# Patient Record
Sex: FEMALE | Race: WHITE | NOT HISPANIC OR LATINO | Employment: FULL TIME | URBAN - METROPOLITAN AREA
[De-identification: names, ages, dates, MRNs, and addresses within clinical notes are randomized per-mention and may not be internally consistent; named-entity substitution may affect disease eponyms.]

---

## 2017-04-26 ENCOUNTER — TELEPHONE (OUTPATIENT)
Dept: OBGYN | Facility: MEDICAL CENTER | Age: 27
End: 2017-04-26

## 2017-04-26 ENCOUNTER — TELEPHONE (OUTPATIENT)
Dept: URGENT CARE | Facility: CLINIC | Age: 27
End: 2017-04-26

## 2017-04-26 ENCOUNTER — OFFICE VISIT (OUTPATIENT)
Dept: URGENT CARE | Facility: CLINIC | Age: 27
End: 2017-04-26
Payer: COMMERCIAL

## 2017-04-26 ENCOUNTER — HOSPITAL ENCOUNTER (OUTPATIENT)
Dept: LAB | Facility: MEDICAL CENTER | Age: 27
End: 2017-04-26
Attending: NURSE PRACTITIONER
Payer: COMMERCIAL

## 2017-04-26 VITALS
DIASTOLIC BLOOD PRESSURE: 82 MMHG | RESPIRATION RATE: 16 BRPM | SYSTOLIC BLOOD PRESSURE: 102 MMHG | WEIGHT: 160 LBS | HEART RATE: 69 BPM | HEIGHT: 69 IN | BODY MASS INDEX: 23.7 KG/M2 | OXYGEN SATURATION: 98 % | TEMPERATURE: 98.2 F

## 2017-04-26 DIAGNOSIS — R20.2 TINGLING: ICD-10-CM

## 2017-04-26 DIAGNOSIS — R42 LIGHT HEADEDNESS: ICD-10-CM

## 2017-04-26 DIAGNOSIS — Z30.432 ENCOUNTER FOR IUD REMOVAL: ICD-10-CM

## 2017-04-26 LAB
ALBUMIN SERPL BCP-MCNC: 4.6 G/DL (ref 3.2–4.9)
ALBUMIN/GLOB SERPL: 1.6 G/DL
ALP SERPL-CCNC: 63 U/L (ref 30–99)
ALT SERPL-CCNC: 14 U/L (ref 2–50)
ANION GAP SERPL CALC-SCNC: 10 MMOL/L (ref 0–11.9)
AST SERPL-CCNC: 20 U/L (ref 12–45)
BASOPHILS # BLD AUTO: 0.7 % (ref 0–1.8)
BASOPHILS # BLD: 0.05 K/UL (ref 0–0.12)
BILIRUB SERPL-MCNC: 1.2 MG/DL (ref 0.1–1.5)
BUN SERPL-MCNC: 7 MG/DL (ref 8–22)
CALCIUM SERPL-MCNC: 9 MG/DL (ref 8.4–10.2)
CHLORIDE SERPL-SCNC: 101 MMOL/L (ref 96–112)
CO2 SERPL-SCNC: 25 MMOL/L (ref 20–33)
CREAT SERPL-MCNC: 0.56 MG/DL (ref 0.5–1.4)
EKG 4674: NORMAL
EOSINOPHIL # BLD AUTO: 0.16 K/UL (ref 0–0.51)
EOSINOPHIL NFR BLD: 2.1 % (ref 0–6.9)
ERYTHROCYTE [DISTWIDTH] IN BLOOD BY AUTOMATED COUNT: 42 FL (ref 35.9–50)
GFR SERPL CREATININE-BSD FRML MDRD: >60 ML/MIN/1.73 M 2
GLOBULIN SER CALC-MCNC: 2.8 G/DL (ref 1.9–3.5)
GLUCOSE SERPL-MCNC: 98 MG/DL (ref 65–99)
HCT VFR BLD AUTO: 40.1 % (ref 37–47)
HGB BLD-MCNC: 13.9 G/DL (ref 12–16)
IMM GRANULOCYTES # BLD AUTO: 0.01 K/UL (ref 0–0.11)
IMM GRANULOCYTES NFR BLD AUTO: 0.1 % (ref 0–0.9)
LYMPHOCYTES # BLD AUTO: 2.8 K/UL (ref 1–4.8)
LYMPHOCYTES NFR BLD: 36.7 % (ref 22–41)
MCH RBC QN AUTO: 32.3 PG (ref 27–33)
MCHC RBC AUTO-ENTMCNC: 34.7 G/DL (ref 33.6–35)
MCV RBC AUTO: 93.3 FL (ref 81.4–97.8)
MONOCYTES # BLD AUTO: 0.48 K/UL (ref 0–0.85)
MONOCYTES NFR BLD AUTO: 6.3 % (ref 0–13.4)
NEUTROPHILS # BLD AUTO: 4.13 K/UL (ref 2–7.15)
NEUTROPHILS NFR BLD: 54.1 % (ref 44–72)
NRBC # BLD AUTO: 0 K/UL
NRBC BLD AUTO-RTO: 0 /100 WBC
PLATELET # BLD AUTO: 305 K/UL (ref 164–446)
PMV BLD AUTO: 8.6 FL (ref 9–12.9)
POTASSIUM SERPL-SCNC: 4 MMOL/L (ref 3.6–5.5)
PROT SERPL-MCNC: 7.4 G/DL (ref 6–8.2)
RBC # BLD AUTO: 4.3 M/UL (ref 4.2–5.4)
SODIUM SERPL-SCNC: 136 MMOL/L (ref 135–145)
WBC # BLD AUTO: 7.6 K/UL (ref 4.8–10.8)

## 2017-04-26 PROCEDURE — 36415 COLL VENOUS BLD VENIPUNCTURE: CPT

## 2017-04-26 PROCEDURE — 80053 COMPREHEN METABOLIC PANEL: CPT

## 2017-04-26 PROCEDURE — 85025 COMPLETE CBC W/AUTO DIFF WBC: CPT

## 2017-04-26 PROCEDURE — 93000 ELECTROCARDIOGRAM COMPLETE: CPT | Performed by: NURSE PRACTITIONER

## 2017-04-26 PROCEDURE — 99214 OFFICE O/P EST MOD 30 MIN: CPT | Performed by: NURSE PRACTITIONER

## 2017-04-26 RX ORDER — ALPRAZOLAM 0.25 MG/1
0.25 TABLET ORAL 3 TIMES DAILY PRN
Qty: 20 TAB | Refills: 0 | Status: SHIPPED | OUTPATIENT
Start: 2017-04-26 | End: 2017-05-06

## 2017-04-26 ASSESSMENT — ENCOUNTER SYMPTOMS
DIZZINESS: 1
TREMORS: 0
SENSORY CHANGE: 0
RESPIRATORY NEGATIVE: 1
SEIZURES: 0
SHORTNESS OF BREATH: 0
FEVER: 0
PSYCHIATRIC NEGATIVE: 1
LOSS OF CONSCIOUSNESS: 0
TINGLING: 1
FOCAL WEAKNESS: 0
SPEECH CHANGE: 0
MUSCULOSKELETAL NEGATIVE: 1
CHILLS: 0

## 2017-04-26 ASSESSMENT — PAIN SCALES - GENERAL: PAINLEVEL: NO PAIN

## 2017-04-26 NOTE — TELEPHONE ENCOUNTER
Dominick Cramer pt has new pt apt on 6/28/17 and would like to have IUD removed only. Referral placed today.

## 2017-04-26 NOTE — MR AVS SNAPSHOT
"        Karyn Dalton   2017 2:00 PM   Office Visit   MRN: [id number]    Department:  [facility]   Dept Phone:  [phone]    Description:  Female : 1990   Provider:  Cathey J Hamman, A.P.N.           Reason for Visit     Dizziness 6pm yesterday, tingling in arms and legs, dizzy      Allergies as of 2017     No Known Allergies      You were diagnosed with     Light headedness   [564250]       Tingling   [382983]         Vital Signs     Blood Pressure Pulse Temperature Respirations Height Weight    102/82 mmHg 69 36.8 °C (98.2 °F) 16 1.753 m (5' 9.02\") 72.576 kg (160 lb)    Body Mass Index Oxygen Saturation Smoking Status             23.62 kg/m2 98% Never Smoker          Basic Information     Date Of Birth Sex Race Ethnicity Preferred Language    1990 Female White Non- English      Health Maintenance        Date Due Completion Dates    IMM DTaP/Tdap/Td Vaccine (1 - Tdap) 9/10/2007 ---    PAP SMEAR 9/10/2009 ---            Results     POCT EKG      Component    EKG                        Current Immunizations     No immunizations on file.      Below and/or attached are the medications your provider expects you to take. Review all of your home medications and newly ordered medications with your provider and/or pharmacist. Follow medication instructions as directed by your provider and/or pharmacist. Please keep your medication list with you and share with your provider. Update the information when medications are discontinued, doses are changed, or new medications (including over-the-counter products) are added; and carry medication information at all times in the event of emergency situations     Allergies:  No Known Allergies          Medications  Valid as of: 2017 -  3:04 PM    Generic Name Brand Name Tablet Size Instructions for use    ALPRAZolam (Tab) XANAX 0.25 MG Take 1 Tab by mouth 3 times a day as needed for Anxiety.        Levonorgestrel (IUD) MIRENA 20 " MCG/24HR 1 Each by Intrauterine route Once.        Ondansetron (TABLET DISPERSIBLE) ZOFRAN ODT 4 MG Take 1 Tab by mouth every 8 hours as needed for Nausea/Vomiting.        .                 Medicines prescribed today were sent to:     [facility] #[id number] - [place], [place] - [address]    [address] [place] [address]    Phone: [phone] Fax: [phone]    Open 24 Hours?: No      Medication refill instructions:       If your prescription bottle indicates you have medication refills left, it is not necessary to call your provider’s office. Please contact your pharmacy and they will refill your medication.    If your prescription bottle indicates you do not have any refills left, you may request refills at any time through one of the following ways: The online [employer] system (except Urgent Care), by calling your provider’s office, or by asking your pharmacy to contact your provider’s office with a refill request. Medication refills are processed only during regular business hours and may not be available until the next business day. Your provider may request additional information or to have a follow-up visit with you prior to refilling your medication.   *Please Note: Medication refills are assigned a new Rx number when refilled electronically. Your pharmacy may indicate that no refills were authorized even though a new prescription for the same medication is available at the pharmacy. Please request the medicine by name with the pharmacy before contacting your provider for a refill.        Your To Do List     Future Labs/Procedures Complete By Expires    CBC WITH DIFFERENTIAL  As directed 4/26/2018    COMP METABOLIC PANEL  As directed 4/26/2018      Referral     A referral request has been sent to our patient care coordination department. Please allow 3-5 business days for us to process this request and contact you either by phone or mail. If you do not hear from us by the 5th business day,  please call us at [phone].           MuckRock Access Code: [email or web address]  Expires: 5/26/2017  3:04 PM    MuckRock  A secure, online tool to manage your health information     [employer]’s MuckRock® is a secure, online tool that connects you to your personalized health information from the privacy of your home -- day or night - making it very easy for you to manage your healthcare. Once the activation process is completed, you can even access your medical information using the MuckRock giselle, which is available for free in the Apple Giselle store or Google Play store.     MuckRock provides the following levels of access (as shown below):   My Chart Features   [facility] Doctor [facility]  Specialists [facility]  Urgent  [facility]  Primary Care  Doctor   Email your healthcare team securely and privately 24/7 X X X    Manage appointments: schedule your next appointment; view details of past/upcoming appointments X      Request prescription refills. X      View recent personal medical records, including lab and immunizations X X X X   View health record, including health history, allergies, medications X X X X   Read reports about your outpatient visits, procedures, consult and ER notes X X X X   See your discharge summary, which is a recap of your hospital and/or ER visit that includes your diagnosis, lab results, and care plan. X X       How to register for MuckRock:  1. Go to  [email or web address].  2. Click on the Sign Up Now box, which takes you to the New Member Sign Up page. You will need to provide the following information:  a. Enter your MuckRock Access Code exactly as it appears at the top of this page. (You will not need to use this code after you’ve completed the sign-up process. If you do not sign up before the expiration date, you must request a new code.)   b. Enter your date of birth.   c. Enter your home email address.   d. Click Submit, and follow the next screen’s  instructions.  3. Create a HipLinkt ID. This will be your HipLinkt login ID and cannot be changed, so think of one that is secure and easy to remember.  4. Create a HipLinkt password. You can change your password at any time.  5. Enter your Password Reset Question and Answer. This can be used at a later time if you forget your password.   6. Enter your e-mail address. This allows you to receive e-mail notifications when new information is available in [employer].  7. Click Sign Up. You can now view your health information.    For assistance activating your [employer] account, call [phone]

## 2017-04-26 NOTE — PROGRESS NOTES
Subjective:      Karyn Dalton is a 26 y.o. female who presents with Dizziness    PMH: no history of chronic illness    Social hx:  Non-smoker    Family hx: not pertinent t to today's visit    Allergies: Review of patient's allergies indicates no known allergies.    This patient is a 18-year-old female who presents today with complaint of episode of lightheadedness, and subsequent tingling in her hands and legs. She states that she was doing yard work and was not exerting herself, but she was in a hunched over position. She stood up and started she became very lightheaded. She states that she believes this lightheadedness lasted for about 2 hours and she also had tingling in her hands and feet. States that that feeling has continued today though not to the same extent. She does admit to being anxious about many things, and states that she did feel anxious after she became lightheaded last night. She has not had any chest pain or shortness of breath. She does not have any history of congenital heart disease. She has been otherwise healthy.          Dizziness  This is a new problem. The current episode started yesterday. The problem occurs intermittently. The problem has been waxing and waning. Pertinent negatives include no chest pain, chills or fever. Nothing aggravates the symptoms. She has tried nothing for the symptoms. The treatment provided no relief.       Review of Systems   Constitutional: Negative for fever, chills and malaise/fatigue.   Respiratory: Negative.  Negative for shortness of breath.    Cardiovascular: Negative for chest pain.   Genitourinary: Negative.    Musculoskeletal: Negative.    Skin: Negative.    Neurological: Positive for dizziness and tingling. Negative for tremors, sensory change, speech change, focal weakness, seizures and loss of consciousness.   Psychiatric/Behavioral: Negative.      All other systems reviewed and are negative        Objective:     Temp(Src) 36.8 °C (98.3 °F)   "Resp 16  Ht 1.753 m (5' 9.02\")  Wt 72.576 kg (160 lb)  BMI 23.62 kg/m2     Physical Exam   Constitutional: She is oriented to person, place, and time. She appears well-developed and well-nourished. No distress.   Cardiovascular: Normal rate and regular rhythm.    Musculoskeletal: Normal range of motion.   Neurological: She is alert and oriented to person, place, and time. She has normal strength. She displays no atrophy and no tremor. No cranial nerve deficit or sensory deficit. She exhibits normal muscle tone. She displays a negative Romberg sign. She displays no seizure activity. Coordination and gait normal. GCS eye subscore is 4. GCS verbal subscore is 5. GCS motor subscore is 6.   Cranial nerves II through XII intact. Cerebellar function intact.  5/5 and equal in the upper extremities. Push/pull 5/5 and equal in the upper extremities. Strength is 5/5 and equal in the lower extremities. Facial features symmetric with equal movement, speech is clear and logical. Proprioception is intact. Romberg is negative. Gait is even and steady.   Skin: She is not diaphoretic.   Vitals reviewed.    EKG: NSR, no ST elevation or depression, no ectopy, no ischemic changes          Assessment/Plan:   Light headedness; possibly postural  Numbness/tingling; possibly secondary to anxiety    -Referral given to cardiology at patient's request   -Patient intends to follow up also with a counselor or mental health    -Follow up also with PCP   -ER precautions given for recurrent symptoms   -CBC, CMP   -xanax PRN for acute anxiety  There are no diagnoses linked to this encounter.      "

## 2017-04-27 NOTE — TELEPHONE ENCOUNTER
Attempted to notify patient by phone of results. No answer. Left detailed VM with results and requested call back for any questions or concerns. All labs are within normal limits. Patient is not currently registered for [employer].

## 2017-05-06 ENCOUNTER — APPOINTMENT (OUTPATIENT)
Dept: RADIOLOGY | Facility: MEDICAL CENTER | Age: 27
End: 2017-05-06
Attending: EMERGENCY MEDICINE
Payer: COMMERCIAL

## 2017-05-06 ENCOUNTER — HOSPITAL ENCOUNTER (EMERGENCY)
Facility: MEDICAL CENTER | Age: 27
End: 2017-05-07
Attending: EMERGENCY MEDICINE
Payer: COMMERCIAL

## 2017-05-06 DIAGNOSIS — R10.2 ACUTE PELVIC PAIN, FEMALE: ICD-10-CM

## 2017-05-06 DIAGNOSIS — E86.0 DEHYDRATION: ICD-10-CM

## 2017-05-06 LAB
ALBUMIN SERPL BCP-MCNC: 4.9 G/DL (ref 3.2–4.9)
ALBUMIN/GLOB SERPL: 1.8 G/DL
ALP SERPL-CCNC: 77 U/L (ref 30–99)
ALT SERPL-CCNC: 17 U/L (ref 2–50)
ANION GAP SERPL CALC-SCNC: 13 MMOL/L (ref 0–11.9)
APPEARANCE UR: CLEAR
AST SERPL-CCNC: 26 U/L (ref 12–45)
BASOPHILS # BLD AUTO: 0.6 % (ref 0–1.8)
BASOPHILS # BLD: 0.07 K/UL (ref 0–0.12)
BILIRUB SERPL-MCNC: 0.7 MG/DL (ref 0.1–1.5)
BILIRUB UR QL STRIP.AUTO: NEGATIVE
BUN SERPL-MCNC: 13 MG/DL (ref 8–22)
CALCIUM SERPL-MCNC: 9.4 MG/DL (ref 8.4–10.2)
CHLORIDE SERPL-SCNC: 101 MMOL/L (ref 96–112)
CO2 SERPL-SCNC: 23 MMOL/L (ref 20–33)
COLOR UR: YELLOW
CREAT SERPL-MCNC: 0.84 MG/DL (ref 0.5–1.4)
CULTURE IF INDICATED INDCX: NO UA CULTURE
EOSINOPHIL # BLD AUTO: 0.13 K/UL (ref 0–0.51)
EOSINOPHIL NFR BLD: 1.1 % (ref 0–6.9)
ERYTHROCYTE [DISTWIDTH] IN BLOOD BY AUTOMATED COUNT: 40.5 FL (ref 35.9–50)
GFR SERPL CREATININE-BSD FRML MDRD: >60 ML/MIN/1.73 M 2
GLOBULIN SER CALC-MCNC: 2.8 G/DL (ref 1.9–3.5)
GLUCOSE SERPL-MCNC: 90 MG/DL (ref 65–99)
GLUCOSE UR STRIP.AUTO-MCNC: NEGATIVE MG/DL
HCG UR QL: NEGATIVE
HCT VFR BLD AUTO: 41.6 % (ref 37–47)
HGB BLD-MCNC: 14.7 G/DL (ref 12–16)
IMM GRANULOCYTES # BLD AUTO: 0.03 K/UL (ref 0–0.11)
IMM GRANULOCYTES NFR BLD AUTO: 0.3 % (ref 0–0.9)
KETONES UR STRIP.AUTO-MCNC: ABNORMAL MG/DL
LEUKOCYTE ESTERASE UR QL STRIP.AUTO: NEGATIVE
LIPASE SERPL-CCNC: 30 U/L (ref 7–58)
LYMPHOCYTES # BLD AUTO: 3.12 K/UL (ref 1–4.8)
LYMPHOCYTES NFR BLD: 27.5 % (ref 22–41)
MCH RBC QN AUTO: 32.2 PG (ref 27–33)
MCHC RBC AUTO-ENTMCNC: 35.3 G/DL (ref 33.6–35)
MCV RBC AUTO: 91 FL (ref 81.4–97.8)
MICRO URNS: ABNORMAL
MONOCYTES # BLD AUTO: 0.75 K/UL (ref 0–0.85)
MONOCYTES NFR BLD AUTO: 6.6 % (ref 0–13.4)
NEUTROPHILS # BLD AUTO: 7.23 K/UL (ref 2–7.15)
NEUTROPHILS NFR BLD: 63.9 % (ref 44–72)
NITRITE UR QL STRIP.AUTO: NEGATIVE
NRBC # BLD AUTO: 0 K/UL
NRBC BLD AUTO-RTO: 0 /100 WBC
PH UR STRIP.AUTO: 5 [PH]
PLATELET # BLD AUTO: 344 K/UL (ref 164–446)
PMV BLD AUTO: 9.5 FL (ref 9–12.9)
POTASSIUM SERPL-SCNC: 3.3 MMOL/L (ref 3.6–5.5)
PROT SERPL-MCNC: 7.7 G/DL (ref 6–8.2)
PROT UR QL STRIP: NEGATIVE MG/DL
RBC # BLD AUTO: 4.57 M/UL (ref 4.2–5.4)
RBC UR QL AUTO: NEGATIVE
SODIUM SERPL-SCNC: 137 MMOL/L (ref 135–145)
SP GR UR REFRACTOMETRY: 1.02
SP GR UR STRIP.AUTO: 1.02
WBC # BLD AUTO: 11.3 K/UL (ref 4.8–10.8)

## 2017-05-06 PROCEDURE — 85025 COMPLETE CBC W/AUTO DIFF WBC: CPT

## 2017-05-06 PROCEDURE — 96374 THER/PROPH/DIAG INJ IV PUSH: CPT

## 2017-05-06 PROCEDURE — 80053 COMPREHEN METABOLIC PANEL: CPT

## 2017-05-06 PROCEDURE — 96375 TX/PRO/DX INJ NEW DRUG ADDON: CPT

## 2017-05-06 PROCEDURE — 83690 ASSAY OF LIPASE: CPT

## 2017-05-06 PROCEDURE — 81003 URINALYSIS AUTO W/O SCOPE: CPT

## 2017-05-06 PROCEDURE — 81025 URINE PREGNANCY TEST: CPT

## 2017-05-06 PROCEDURE — 700111 HCHG RX REV CODE 636 W/ 250 OVERRIDE (IP): Performed by: EMERGENCY MEDICINE

## 2017-05-06 PROCEDURE — 96361 HYDRATE IV INFUSION ADD-ON: CPT

## 2017-05-06 PROCEDURE — 76830 TRANSVAGINAL US NON-OB: CPT

## 2017-05-06 PROCEDURE — 700105 HCHG RX REV CODE 258: Performed by: EMERGENCY MEDICINE

## 2017-05-06 PROCEDURE — 99285 EMERGENCY DEPT VISIT HI MDM: CPT

## 2017-05-06 RX ORDER — SODIUM CHLORIDE 9 MG/ML
1000 INJECTION, SOLUTION INTRAVENOUS ONCE
Status: COMPLETED | OUTPATIENT
Start: 2017-05-06 | End: 2017-05-07

## 2017-05-06 RX ORDER — ONDANSETRON 2 MG/ML
4 INJECTION INTRAMUSCULAR; INTRAVENOUS ONCE
Status: COMPLETED | OUTPATIENT
Start: 2017-05-06 | End: 2017-05-06

## 2017-05-06 RX ORDER — MORPHINE SULFATE 10 MG/ML
6 INJECTION, SOLUTION INTRAMUSCULAR; INTRAVENOUS ONCE
Status: COMPLETED | OUTPATIENT
Start: 2017-05-06 | End: 2017-05-06

## 2017-05-06 RX ADMIN — MORPHINE SULFATE 6 MG: 10 INJECTION INTRAVENOUS at 23:11

## 2017-05-06 RX ADMIN — SODIUM CHLORIDE 1000 ML: 9 INJECTION, SOLUTION INTRAVENOUS at 23:11

## 2017-05-06 RX ADMIN — ONDANSETRON 4 MG: 2 INJECTION INTRAMUSCULAR; INTRAVENOUS at 23:12

## 2017-05-06 ASSESSMENT — PAIN SCALES - GENERAL: PAINLEVEL_OUTOF10: 6

## 2017-05-06 NOTE — ED AVS SNAPSHOT
5/7/2017    Karyn Dalton  [address] [place] [address]    Dear Karyn:    [facility] wants to ensure your discharge home is safe and you or your loved ones have had all of your questions answered regarding your care after you leave the hospital.    Below is a list of resources and contact information should you have any questions regarding your hospital stay, follow-up instructions, or active medical symptoms.    Questions or Concerns Regarding… Contact   Medical Questions Related to Your Discharge  (7 days a week, 8am-5pm) Contact a Nurse Care Coordinator   [phone]   Medical Questions Not Related to Your Discharge  (24 hours a day / 7 days a week)  Contact the Nurse Health Line   [phone]    Medications or Discharge Instructions Refer to your discharge packet   or contact your [facility] Primary Care Provider   [phone]   Follow-up Appointment(s) Schedule your appointment via [employer]   or contact Scheduling [phone]   Billing Review your statement via [employer]  or contact Billing [phone]   Medical Records Review your records via [employer]   or contact Medical Records [phone]     You may receive a telephone call within two days of discharge. This call is to make certain you understand your discharge instructions and have the opportunity to have any questions answered. You can also easily access your medical information, test results and upcoming appointments via the [employer] free online health management tool. You can learn more and sign up at [employer]. For assistance setting up your [employer] account, please call [phone].    Once again, we want to ensure your discharge home is safe and that you have a clear understanding of any next steps in your care. If you have any questions or concerns, please do not hesitate to contact us, we are here for you. Thank you for choosing [facility] for your healthcare needs.    Sincerely,    Your [facility] Healthcare Team

## 2017-05-06 NOTE — ED AVS SNAPSHOT
Home Care Instructions                                                                                                                Karyn Dalton   MRN: [id number]    Department:  [facility], Emergency Dept   Date of Visit:  5/6/2017            [facility], Emergency Dept    [address]    [place] [address]    Phone:  [phone]      You were seen by     Sharmin Boykin M.D.      Your Diagnosis Was     Acute pelvic pain, female     R10.2       These are the medications you received during your hospitalization from 05/06/2017 2209 to 05/07/2017 0107     Date/Time Order Dose Route Action    05/06/2017 2311 NS infusion 1,000 mL 1,000 mL Intravenous New Bag    05/06/2017 2311 morphine (pf) 10 mg/ml injection 6 mg 6 mg Intravenous Given    05/06/2017 2312 ondansetron (ZOFRAN) syringe/vial injection 4 mg 4 mg Intravenous Given      Follow-up Information     1. Follow up with Mary Trevino M.D.. Schedule an appointment as soon as possible for a visit in 3 days.    Specialty:  Family Medicine    Contact information    [address] [place] [address] [address]  [address]  [place] [address] [phone]        Medication Information     Review all of your home medications and newly ordered medications with your primary doctor and/or pharmacist as soon as possible. Follow medication instructions as directed by your doctor and/or pharmacist.     Please keep your complete medication list with you and share with your physician. Update the information when medications are discontinued, doses are changed, or new medications (including over-the-counter products) are added; and carry medication information at all times in the event of emergency situations.               Medication List      START taking these medications        Instructions    Morning Afternoon Evening Bedtime    hydrocodone-acetaminophen 5-325 MG Tabs per tablet   Commonly known as:  NORCO        Take 1-2 Tabs by  "mouth every four hours as needed (severe pain).   Dose:  1-2 Tab                        naproxen 500 MG Tabs   Commonly known as:  NAPROSYN        Take 1 Tab by mouth 2 times daily with meals as needed (moderate pain).   Dose:  500 mg                          ASK your doctor about these medications        Instructions    Morning Afternoon Evening Bedtime    MIRENA (52 MG) 20 MCG/24HR IUD   Generic drug:  levonorgestrel        1 Each by Intrauterine route Once.   Dose:  1 Each                             Where to Get Your Medications      You can get these medications from any pharmacy     Bring a paper prescription for each of these medications    - hydrocodone-acetaminophen 5-325 MG Tabs per tablet  - naproxen 500 MG Tabs            Procedures and tests performed during your visit     CBC WITH DIFFERENTIAL    COMP METABOLIC PANEL    ESTIMATED GFR    HCG QUALITATIVE UR (Lab)    IV Saline Lock    LIPASE    Oxygen    Pulse Ox    REFRACTOMETER SG    URINALYSIS CULTURE, IF INDICATED    US-GYN-PELVIS TRANSVAGINAL        Discharge Instructions       Ovarian Cyst  An ovarian cyst is a fluid-filled sac that forms on an ovary. The ovaries are small organs that produce eggs in women. Various types of cysts can form on the ovaries. Most are not cancerous. Many do not cause problems, and they often go away on their own. Some may cause symptoms and require treatment. Common types of ovarian cysts include:  · Functional cysts--These cysts may occur every month during the menstrual cycle. This is normal. The cysts usually go away with the next menstrual cycle if the woman does not get pregnant. Usually, there are no symptoms with a functional cyst.  · Endometrioma cysts--These cysts form from the tissue that lines the uterus. They are also called \"chocolate cysts\" because they become filled with blood that turns brown. This type of cyst can cause pain in the lower abdomen during intercourse and with your menstrual " period.  · Cystadenoma cysts--This type develops from the cells on the outside of the ovary. These cysts can get very big and cause lower abdomen pain and pain with intercourse. This type of cyst can twist on itself, cut off its blood supply, and cause severe pain. It can also easily rupture and cause a lot of pain.  · Dermoid cysts--This type of cyst is sometimes found in both ovaries. These cysts may contain different kinds of body tissue, such as skin, teeth, hair, or cartilage. They usually do not cause symptoms unless they get very big.  · Theca lutein cysts--These cysts occur when too much of a certain hormone (human chorionic gonadotropin) is produced and overstimulates the ovaries to produce an egg. This is most common after procedures used to assist with the conception of a baby (in vitro fertilization).  CAUSES   · Fertility drugs can cause a condition in which multiple large cysts are formed on the ovaries. This is called ovarian hyperstimulation syndrome.  · A condition called polycystic ovary syndrome can cause hormonal imbalances that can lead to nonfunctional ovarian cysts.  SIGNS AND SYMPTOMS   Many ovarian cysts do not cause symptoms. If symptoms are present, they may include:  · Pelvic pain or pressure.  · Pain in the lower abdomen.  · Pain during sexual intercourse.  · Increasing girth (swelling) of the abdomen.  · Abnormal menstrual periods.  · Increasing pain with menstrual periods.  · Stopping having menstrual periods without being pregnant.  DIAGNOSIS   These cysts are commonly found during a routine or annual pelvic exam. Tests may be ordered to find out more about the cyst. These tests may include:  · Ultrasound.  · X-ray of the pelvis.  · CT scan.  · MRI.  · Blood tests.  TREATMENT   Many ovarian cysts go away on their own without treatment. Your health care provider may want to check your cyst regularly for 2-3 months to see if it changes. For women in menopause, it is particularly  important to monitor a cyst closely because of the higher rate of ovarian cancer in menopausal women. When treatment is needed, it may include any of the following:  · A procedure to drain the cyst (aspiration). This may be done using a long needle and ultrasound. It can also be done through a laparoscopic procedure. This involves using a thin, lighted tube with a tiny camera on the end (laparoscope) inserted through a small incision.  · Surgery to remove the whole cyst. This may be done using laparoscopic surgery or an open surgery involving a larger incision in the lower abdomen.  · Hormone treatment or birth control pills. These methods are sometimes used to help dissolve a cyst.  HOME CARE INSTRUCTIONS   · Only take over-the-counter or prescription medicines as directed by your health care provider.  · Follow up with your health care provider as directed.  · Get regular pelvic exams and Pap tests.  SEEK MEDICAL CARE IF:   · Your periods are late, irregular, or painful, or they stop.  · Your pelvic pain or abdominal pain does not go away.  · Your abdomen becomes larger or swollen.  · You have pressure on your bladder or trouble emptying your bladder completely.  · You have pain during sexual intercourse.  · You have feelings of fullness, pressure, or discomfort in your stomach.  · You lose weight for no apparent reason.  · You feel generally ill.  · You become constipated.  · You lose your appetite.  · You develop acne.  · You have an increase in body and facial hair.  · You are gaining weight, without changing your exercise and eating habits.  · You think you are pregnant.  SEEK IMMEDIATE MEDICAL CARE IF:   · You have increasing abdominal pain.  · You feel sick to your stomach (nauseous), and you throw up (vomit).  · You develop a fever that comes on suddenly.  · You have abdominal pain during a bowel movement.  · Your menstrual periods become heavier than usual.  MAKE SURE YOU:  · Understand these  instructions.  · Will watch your condition.  · Will get help right away if you are not doing well or get worse.     This information is not intended to replace advice given to you by your health care provider. Make sure you discuss any questions you have with your health care provider.     Document Released: 12/18/2006 Document Revised: 12/23/2014 Document Reviewed: 08/25/2014  [employer] Patient Education ©2016 [employer]            Patient Information     Patient Information    Following emergency treatment: all patient requiring follow-up care must return either to a private physician or a clinic if your condition worsens before you are able to obtain further medical attention, please return to the emergency room.     Billing Information    At [facility], we work to make the billing process streamlined for our patients.  Our Representatives are here to answer any questions you may have regarding your hospital bill.  If you have insurance coverage and have supplied your insurance information to us, we will submit a claim to your insurer on your behalf.  Should you have any questions regarding your bill, we can be reached online or by phone as follows:  Online: You are able pay your bills online or live chat with our representatives about any billing questions you may have. We are here to help Monday - Friday from 8:00am to 7:30pm and 9:00am - 12:00pm on Saturdays.  Please visit [email or web address]  for more information.   Phone:  [phone] or [phone]    Please note that your emergency physician, surgeon, pathologist, radiologist, anesthesiologist, and other specialists are not employed by [facility] and will therefore bill separately for their services.  Please contact them directly for any questions concerning their bills at the numbers below:     Emergency Physician Services:  [phone]  [facility]:  [phone]  [facility]:  [phone]  [facility]:  [phone]    1. Your final bill may vary from the amount quoted upon discharge if all procedures are not complete at that time, or if your doctor has additional procedures of which we are not aware. You will receive an additional bill if you return to the Emergency Department at [facility] hospital for suture removal regardless of the facility of which the sutures were placed.     2. Please arrange for settlement of this account at the emergency registration.    3. All self-pay accounts are due in full at the time of treatment.  If you are unable to meet this obligation then payment is expected within 4-5 days.     4. If you have had radiology studies (CT, X-ray, Ultrasound, MRI), you have received a preliminary result during your emergency department visit. Please contact the radiology department [phone] to receive a copy of your final result. Please discuss the Final result with your primary physician or with the follow up physician provided.     Crisis Hotline:  [place] Crisis Hotline:  4-165-EUDEPOT or 1-812.255.9161  [place] Crisis Hotline:    1-939.786.4305 or 954-386-0364         ED Discharge Follow Up Questions    1. In order to provide you with very good care, we would like to follow up with a phone call in the next few days.  May we have your permission to contact you?     YES /  NO    2. What is the best phone number to call you? (       )_____-__________    3. What is the best time to call you?      Morning  /  Afternoon  /  Evening                   Patient Signature:  ____________________________________________________________    Date:  ____________________________________________________________      Your appointments     Jun 28, 2017  3:30 PM   Annual Exam with Marybeth Chisholm M.D.   [facility])    [address] [facility] [address]  [facility] [address]   [phone]

## 2017-05-06 NOTE — ED AVS SNAPSHOT
[employer] Access Code: Activation code not generated  Current [employer] Status: Active    Inaikahart  A secure, online tool to manage your health information     [employer]’s [employer]® is a secure, online tool that connects you to your personalized health information from the privacy of your home -- day or night - making it very easy for you to manage your healthcare. Once the activation process is completed, you can even access your medical information using the [employer] giselle, which is available for free in the Apple Giselle store or Google Play store.     [employer] provides the following levels of access (as shown below):   My Chart Features   [facility] Primary Care Doctor [facility]  Specialists [facility]  Urgent  Care Non-[facility]  Primary Care  Doctor   Email your healthcare team securely and privately 24/7 X X X X   Manage appointments: schedule your next appointment; view details of past/upcoming appointments X      Request prescription refills. X      View recent personal medical records, including lab and immunizations X X X X   View health record, including health history, allergies, medications X X X X   Read reports about your outpatient visits, procedures, consult and ER notes X X X X   See your discharge summary, which is a recap of your hospital and/or ER visit that includes your diagnosis, lab results, and care plan. X X       How to register for [employer]:  1. Go to  [email or web address].  2. Click on the Sign Up Now box, which takes you to the New Member Sign Up page. You will need to provide the following information:  a. Enter your [employer] Access Code exactly as it appears at the top of this page. (You will not need to use this code after you’ve completed the sign-up process. If you do not sign up before the expiration date, you must request a new code.)   b. Enter your date of birth.   c. Enter your home email address.   d. Click Submit, and follow the next screen’s instructions.  3. Create a [employer] ID. This will  be your [employer] login ID and cannot be changed, so think of one that is secure and easy to remember.  4. Create a [employer] password. You can change your password at any time.  5. Enter your Password Reset Question and Answer. This can be used at a later time if you forget your password.   6. Enter your e-mail address. This allows you to receive e-mail notifications when new information is available in Billogram.  7. Click Sign Up. You can now view your health information.    For assistance activating your [employer] account, call [phone]

## 2017-05-07 VITALS
BODY MASS INDEX: 23.18 KG/M2 | DIASTOLIC BLOOD PRESSURE: 81 MMHG | OXYGEN SATURATION: 98 % | HEART RATE: 65 BPM | HEIGHT: 69 IN | WEIGHT: 156.53 LBS | RESPIRATION RATE: 18 BRPM | TEMPERATURE: 98.4 F | SYSTOLIC BLOOD PRESSURE: 131 MMHG

## 2017-05-07 PROCEDURE — 96361 HYDRATE IV INFUSION ADD-ON: CPT

## 2017-05-07 RX ORDER — HYDROCODONE BITARTRATE AND ACETAMINOPHEN 5; 325 MG/1; MG/1
1-2 TABLET ORAL EVERY 4 HOURS PRN
Qty: 14 TAB | Refills: 0 | Status: SHIPPED | OUTPATIENT
Start: 2017-05-07 | End: 2017-09-20

## 2017-05-07 RX ORDER — NAPROXEN 500 MG/1
500 TABLET ORAL
Qty: 20 TAB | Refills: 0 | Status: SHIPPED | OUTPATIENT
Start: 2017-05-07 | End: 2017-09-20

## 2017-05-07 ASSESSMENT — PAIN SCALES - GENERAL: PAINLEVEL_OUTOF10: 0

## 2017-05-07 NOTE — ED PROVIDER NOTES
ED Provider Note    ED Provider Note    Scribed for Sharmin Boykin MD by Sharmin Boykin. 5/6/2017, 10:59 PM.    Primary care provider: Pcp Pt States None  Means of arrival: Private  History obtained from: Patient  History limited by: None    CHIEF COMPLAINT  Chief Complaint   Patient presents with   • Abdominal Pain       HPI  Karyn Dalton is a 26 y.o. female who presents to the Emergency Department for acute onset of pelvic pain. Patient relates while she was walking at work sudden onset of severe left lower abdomen/pelvic pain with radiation to the sacrococcygeal region of her back. No right-sided symptoms. Pain is constant but waxing and waning in severity, sharp in character, currently severe localized as noted above. No dysuria, no hematuria, patient relates general fatigue but no nausea, no vomiting, no fever. She has had no vaginal bleeding, last menstrual period about 2 weeks ago. Patient notes somewhat similar symptoms when she was a teenager and again she was in her early 20s and have been diagnosed with ovarian cyst. She is required no abdominal or pelvic surgeries, she has not IUD for birth control. No clear alleviating or exacerbating factors.    REVIEW OF SYSTEMS  Pertinent positives include acute left-sided pelvic pain. Pertinent negatives include no fever, no vaginal bleeding, no dysuria, no vaginal discharge, no hematuria, no right-sided abdominal/pelvic pain.  All other systems reviewed and negative.    PAST MEDICAL HISTORY    previous ovarian cyst, otherwise healthy    SURGICAL HISTORY  patient denies any surgical history    SOCIAL HISTORY  Social History   Substance Use Topics   • Smoking status: Never Smoker    • Smokeless tobacco: Never Used   • Alcohol Use: Yes      Comment: weekly      History   Drug Use No       FAMILY HISTORY  No history in immediate family of ovarian cyst    CURRENT MEDICATIONS  Home Medications     Reviewed by Pio Emanuel R.N. (Registered Nurse)  "on 05/06/17 at 2218  Med List Status: Complete    Medication Last Dose Status    levonorgestrel (MIRENA) 20 MCG/24HR IUD 4/26/2017 Active                ALLERGIES  No Known Allergies    PHYSICAL EXAM  VITAL SIGNS: /81 mmHg  Pulse 65  Temp(Src) 36.9 °C (98.4 °F)  Resp 18  Ht 1.753 m (5' 9\")  Wt 71 kg (156 lb 8.4 oz)  BMI 23.10 kg/m2  SpO2 98%    General: Alert, moderate acute distress  Skin: Warm, dry, normal for ethnicity  Head: Normocephalic, atraumatic  Neck: Trachea midline, no tenderness  Eye: PERRL, normal conjunctiva  ENMT: Oral mucosa moist, no pharyngeal erythema or exudate  Cardiovascular: Regular rate and rhythm, No murmur, Normal peripheral perfusion  Respiratory: Lungs CTA, respirations are non-labored, breath sounds are equal  Gastrointestinal: Soft, left lower quadrant/adnexal tenderness, no palpable masses, no guarding, rebound, no rigidity.  Musculoskeletal: No swelling, no deformity. Left CVA tenderness, mild left sacroiliac and lumbosacral paraspinous tenderness with no midline tenderness or step-off  Neurological: Alert and oriented to person, place, time, and situation  Lymphatics: No lymphadenopathy  Psychiatric: Cooperative, anxious but otherwise appropriate mood & affect      DIAGNOSTIC STUDIES/PROCEDURES    LABS  Results for orders placed or performed during the hospital encounter of 05/06/17   CBC WITH DIFFERENTIAL   Result Value Ref Range    WBC 11.3 (H) 4.8 - 10.8 K/uL    RBC 4.57 4.20 - 5.40 M/uL    Hemoglobin 14.7 12.0 - 16.0 g/dL    Hematocrit 41.6 37.0 - 47.0 %    MCV 91.0 81.4 - 97.8 fL    MCH 32.2 27.0 - 33.0 pg    MCHC 35.3 (H) 33.6 - 35.0 g/dL    RDW 40.5 35.9 - 50.0 fL    Platelet Count 344 164 - 446 K/uL    MPV 9.5 9.0 - 12.9 fL    Neutrophils-Polys 63.90 44.00 - 72.00 %    Lymphocytes 27.50 22.00 - 41.00 %    Monocytes 6.60 0.00 - 13.40 %    Eosinophils 1.10 0.00 - 6.90 %    Basophils 0.60 0.00 - 1.80 %    Immature Granulocytes 0.30 0.00 - 0.90 %    Nucleated RBC " 0.00 /100 WBC    Neutrophils (Absolute) 7.23 (H) 2.00 - 7.15 K/uL    Lymphs (Absolute) 3.12 1.00 - 4.80 K/uL    Monos (Absolute) 0.75 0.00 - 0.85 K/uL    Eos (Absolute) 0.13 0.00 - 0.51 K/uL    Baso (Absolute) 0.07 0.00 - 0.12 K/uL    Immature Granulocytes (abs) 0.03 0.00 - 0.11 K/uL    NRBC (Absolute) 0.00 K/uL   COMP METABOLIC PANEL   Result Value Ref Range    Sodium 137 135 - 145 mmol/L    Potassium 3.3 (L) 3.6 - 5.5 mmol/L    Chloride 101 96 - 112 mmol/L    Co2 23 20 - 33 mmol/L    Anion Gap 13.0 (H) 0.0 - 11.9    Glucose 90 65 - 99 mg/dL    Bun 13 8 - 22 mg/dL    Creatinine 0.84 0.50 - 1.40 mg/dL    Calcium 9.4 8.4 - 10.2 mg/dL    AST(SGOT) 26 12 - 45 U/L    ALT(SGPT) 17 2 - 50 U/L    Alkaline Phosphatase 77 30 - 99 U/L    Total Bilirubin 0.7 0.1 - 1.5 mg/dL    Albumin 4.9 3.2 - 4.9 g/dL    Total Protein 7.7 6.0 - 8.2 g/dL    Globulin 2.8 1.9 - 3.5 g/dL    A-G Ratio 1.8 g/dL   LIPASE   Result Value Ref Range    Lipase 30 7 - 58 U/L   URINALYSIS CULTURE, IF INDICATED   Result Value Ref Range    Color Yellow     Character Clear     Specific Gravity 1.025 <1.035    Ph 5.0 5.0-8.0    Glucose Negative Negative mg/dL    Ketones Trace (A) Negative mg/dL    Protein Negative Negative mg/dL    Bilirubin Negative Negative    Nitrite Negative Negative    Leukocyte Esterase Negative Negative    Occult Blood Negative Negative    Micro Urine Req see below     Culture Indicated No UA Culture   HCG QUALITATIVE UR (Lab)   Result Value Ref Range    Beta-Hcg Urine Negative Negative   REFRACTOMETER SG   Result Value Ref Range    Specific Gravity 1.019    ESTIMATED GFR   Result Value Ref Range    GFR If African American >60 >60 mL/min/1.73 m 2    GFR If Non African American >60 >60 mL/min/1.73 m 2     All labs reviewed by me, leukocytosis without left shift consistent with demargination, unremarkable urinalysis of the ketonuria consistent with mild dehydration.        RADIOLOGY  US-GYN-PELVIS TRANSVAGINAL   Final Result     "  Unremarkable transvaginal appearance of the pelvis.        The radiologist's interpretation of all radiological studies have been reviewed by me.    COURSE & MEDICAL DECISION MAKING  Pertinent Labs & Imaging studies reviewed. (See chart for details)    10:59 PM - Patient seen and examined at bedside. Patient will be treated with IV fluid resuscitation, morphine, Zofran. Ordered pelvic ultrasound as well as urinalysis, hCG, and metabolic workup to evaluate her symptoms. The differential diagnoses include but are not limited to: Ovarian cyst, ovarian torsion, ectopic pregnancy    0004: Patient reassessed, pain much improved, updated with unremarkable labs urinalysis thus far. We are awaiting ultrasound report at this time.    0048: Patient reassessed, and no acute distress, up-to-date with unremarkable workup including ultrasound.    Patient Vitals for the past 24 hrs:   BP Temp Pulse Resp SpO2 Height Weight   05/07/17 0030 - - 65 - 98 % - -   05/06/17 2215 131/81 mmHg 36.9 °C (98.4 °F) 84 18 94 % - -   05/06/17 2213 - - - - - 1.753 m (5' 9\") 71 kg (156 lb 8.4 oz)       Decision Making:  This is a 26 y.o. year old female who presents with sudden onset abrupt severe left pelvic pain. No fever, no vomiting, patient has minimal leukocytosis and no fever with unremarkable urinalysis. There is no evidence of infectious etiology. She does have history of previous ovarian cyst with similar pain. Given severity of her pain however I do think it is reasonable to obtain emergent pelvic ultrasound to evaluate for ovarian torsion. She is hCG negative, last period 2 weeks ago, as such no evidence to suggest ectopic pregnancy. Otherwise no tachycardia, no bandemia, and no fever, no evidence to suggest surgical abdomen    I reviewed prescription monitoring program for patient's narcotic use before prescribing a scheduled drug.The patient will not drink alcohol nor drive with prescribed medications. The patient will return for new " or worsening symptoms and is stable at the time of discharge.    Patient has had high blood pressure while in the emergency department, felt likely secondary to medical condition. Counseled patient to monitor blood pressure at home and follow up with primary care physician.    DISPOSITION:  Patient will be discharged home in stable condition.    FOLLOW UP:  Mary Trevino M.D.  [address] [facility] [facility] [address]  [address]  [place] [address]  [phone]    Schedule an appointment as soon as possible for a visit in 3 days        OUTPATIENT MEDICATIONS:  Discharge Medication List as of 5/7/2017  1:07 AM      START taking these medications    Details   naproxen (NAPROSYN) 500 MG Tab Take 1 Tab by mouth 2 times daily with meals as needed (moderate pain)., Disp-20 Tab, R-0, Print Rx Paper      hydrocodone-acetaminophen (NORCO) 5-325 MG Tab per tablet Take 1-2 Tabs by mouth every four hours as needed (severe pain)., Disp-14 Tab, R-0, Print Rx Paper                 FINAL IMPRESSION  1. Acute pelvic pain, female    2. Dehydration          Sharmin JAQUEZ (Tereza), am scribing for, and in the presence of, Sharmin Boykin MD.    Electronically signed by: Sharmin Boykin (Preethie), 5/6/2017    Sharmin JAQUEZ MD personally performed the services described in this documentation, as scribed by Sharmin Boykin in my presence, and it is both accurate and complete    The note accurately reflects work and decisions made by me.  Sharmin Boykin  5/6/2017  11:39 PM

## 2017-05-07 NOTE — DISCHARGE INSTRUCTIONS
"Ovarian Cyst  An ovarian cyst is a fluid-filled sac that forms on an ovary. The ovaries are small organs that produce eggs in women. Various types of cysts can form on the ovaries. Most are not cancerous. Many do not cause problems, and they often go away on their own. Some may cause symptoms and require treatment. Common types of ovarian cysts include:  · Functional cysts--These cysts may occur every month during the menstrual cycle. This is normal. The cysts usually go away with the next menstrual cycle if the woman does not get pregnant. Usually, there are no symptoms with a functional cyst.  · Endometrioma cysts--These cysts form from the tissue that lines the uterus. They are also called \"chocolate cysts\" because they become filled with blood that turns brown. This type of cyst can cause pain in the lower abdomen during intercourse and with your menstrual period.  · Cystadenoma cysts--This type develops from the cells on the outside of the ovary. These cysts can get very big and cause lower abdomen pain and pain with intercourse. This type of cyst can twist on itself, cut off its blood supply, and cause severe pain. It can also easily rupture and cause a lot of pain.  · Dermoid cysts--This type of cyst is sometimes found in both ovaries. These cysts may contain different kinds of body tissue, such as skin, teeth, hair, or cartilage. They usually do not cause symptoms unless they get very big.  · Theca lutein cysts--These cysts occur when too much of a certain hormone (human chorionic gonadotropin) is produced and overstimulates the ovaries to produce an egg. This is most common after procedures used to assist with the conception of a baby (in vitro fertilization).  CAUSES   · Fertility drugs can cause a condition in which multiple large cysts are formed on the ovaries. This is called ovarian hyperstimulation syndrome.  · A condition called polycystic ovary syndrome can cause hormonal imbalances that can lead to " nonfunctional ovarian cysts.  SIGNS AND SYMPTOMS   Many ovarian cysts do not cause symptoms. If symptoms are present, they may include:  · Pelvic pain or pressure.  · Pain in the lower abdomen.  · Pain during sexual intercourse.  · Increasing girth (swelling) of the abdomen.  · Abnormal menstrual periods.  · Increasing pain with menstrual periods.  · Stopping having menstrual periods without being pregnant.  DIAGNOSIS   These cysts are commonly found during a routine or annual pelvic exam. Tests may be ordered to find out more about the cyst. These tests may include:  · Ultrasound.  · X-ray of the pelvis.  · CT scan.  · MRI.  · Blood tests.  TREATMENT   Many ovarian cysts go away on their own without treatment. Your health care provider may want to check your cyst regularly for 2-3 months to see if it changes. For women in menopause, it is particularly important to monitor a cyst closely because of the higher rate of ovarian cancer in menopausal women. When treatment is needed, it may include any of the following:  · A procedure to drain the cyst (aspiration). This may be done using a long needle and ultrasound. It can also be done through a laparoscopic procedure. This involves using a thin, lighted tube with a tiny camera on the end (laparoscope) inserted through a small incision.  · Surgery to remove the whole cyst. This may be done using laparoscopic surgery or an open surgery involving a larger incision in the lower abdomen.  · Hormone treatment or birth control pills. These methods are sometimes used to help dissolve a cyst.  HOME CARE INSTRUCTIONS   · Only take over-the-counter or prescription medicines as directed by your health care provider.  · Follow up with your health care provider as directed.  · Get regular pelvic exams and Pap tests.  SEEK MEDICAL CARE IF:   · Your periods are late, irregular, or painful, or they stop.  · Your pelvic pain or abdominal pain does not go away.  · Your abdomen becomes  larger or swollen.  · You have pressure on your bladder or trouble emptying your bladder completely.  · You have pain during sexual intercourse.  · You have feelings of fullness, pressure, or discomfort in your stomach.  · You lose weight for no apparent reason.  · You feel generally ill.  · You become constipated.  · You lose your appetite.  · You develop acne.  · You have an increase in body and facial hair.  · You are gaining weight, without changing your exercise and eating habits.  · You think you are pregnant.  SEEK IMMEDIATE MEDICAL CARE IF:   · You have increasing abdominal pain.  · You feel sick to your stomach (nauseous), and you throw up (vomit).  · You develop a fever that comes on suddenly.  · You have abdominal pain during a bowel movement.  · Your menstrual periods become heavier than usual.  MAKE SURE YOU:  · Understand these instructions.  · Will watch your condition.  · Will get help right away if you are not doing well or get worse.     This information is not intended to replace advice given to you by your health care provider. Make sure you discuss any questions you have with your health care provider.     Document Released: 12/18/2006 Document Revised: 12/23/2014 Document Reviewed: 08/25/2014  [employer] Patient Education ©2016 [employer]

## 2017-05-07 NOTE — ED NOTES
Pt given verbal and written discharge instructions with two prescriptions. Verbalized understanding. Able to ambulate under own power.

## 2017-05-07 NOTE — ED NOTES
Report acute onset of abdominal pain radiating to back since approximately 2100 tonight; she reports a history of ovarian cysts.

## 2017-06-28 ENCOUNTER — HOSPITAL ENCOUNTER (OUTPATIENT)
Facility: MEDICAL CENTER | Age: 27
End: 2017-06-28
Attending: OBSTETRICS & GYNECOLOGY
Payer: COMMERCIAL

## 2017-06-28 ENCOUNTER — GYNECOLOGY VISIT (OUTPATIENT)
Dept: OBGYN | Facility: MEDICAL CENTER | Age: 27
End: 2017-06-28
Payer: COMMERCIAL

## 2017-06-28 VITALS
SYSTOLIC BLOOD PRESSURE: 102 MMHG | DIASTOLIC BLOOD PRESSURE: 70 MMHG | BODY MASS INDEX: 23.4 KG/M2 | HEIGHT: 69 IN | WEIGHT: 158 LBS

## 2017-06-28 DIAGNOSIS — Z11.3 SCREENING FOR STD (SEXUALLY TRANSMITTED DISEASE): ICD-10-CM

## 2017-06-28 DIAGNOSIS — Z12.4 ROUTINE CERVICAL SMEAR: ICD-10-CM

## 2017-06-28 DIAGNOSIS — Z01.419 WELL WOMAN EXAM: ICD-10-CM

## 2017-06-28 DIAGNOSIS — Z97.5 IUD (INTRAUTERINE DEVICE) IN PLACE: ICD-10-CM

## 2017-06-28 DIAGNOSIS — F43.10 PTSD (POST-TRAUMATIC STRESS DISORDER): ICD-10-CM

## 2017-06-28 PROCEDURE — 99385 PREV VISIT NEW AGE 18-39: CPT | Performed by: OBSTETRICS & GYNECOLOGY

## 2017-06-28 PROCEDURE — 87591 N.GONORRHOEAE DNA AMP PROB: CPT

## 2017-06-28 PROCEDURE — 88175 CYTOPATH C/V AUTO FLUID REDO: CPT

## 2017-06-28 PROCEDURE — 87491 CHLMYD TRACH DNA AMP PROBE: CPT

## 2017-06-28 NOTE — MR AVS SNAPSHOT
"        Karyn Dalton   2017 3:30 PM   Gynecology Visit   MRN: [id number]    Department:  [facility]   Dept Phone:  [phone]    Description:  Female : 1990   Provider:  Marybeth Chisholm M.D.           Reason for Visit     Gynecologic Exam           Allergies as of 2017     No Known Allergies      You were diagnosed with     Well woman exam   [565975]       Routine cervical smear   [239747]         Vital Signs     Blood Pressure Height Weight Body Mass Index Smoking Status       102/70 mmHg 1.753 m (5' 9\") 71.668 kg (158 lb) 23.32 kg/m2 Never Smoker        Basic Information     Date Of Birth Sex Race Ethnicity Preferred Language    1990 Female White Non- English      Health Maintenance        Date Due Completion Dates    IMM DTaP/Tdap/Td Vaccine (1 - Tdap) 9/10/2007 ---    PAP SMEAR 9/10/2009 ---            Current Immunizations     No immunizations on file.      Below and/or attached are the medications your provider expects you to take. Review all of your home medications and newly ordered medications with your provider and/or pharmacist. Follow medication instructions as directed by your provider and/or pharmacist. Please keep your medication list with you and share with your provider. Update the information when medications are discontinued, doses are changed, or new medications (including over-the-counter products) are added; and carry medication information at all times in the event of emergency situations     Allergies:  No Known Allergies          Medications  Valid as of: 2017 -  4:15 PM    Generic Name Brand Name Tablet Size Instructions for use    Hydrocodone-Acetaminophen (Tab) NORCO 5-325 MG Take 1-2 Tabs by mouth every four hours as needed (severe pain).        Levonorgestrel (IUD) MIRENA 20 MCG/24HR 1 Each by Intrauterine route Once.        Naproxen (Tab) NAPROSYN 500 MG Take 1 Tab by mouth 2 times daily with meals as needed " (moderate pain).        .                 Medicines prescribed today were sent to:     [facility] #[id number] - [place], [place] - [address]    [address] [place] [address]    Phone: [phone] Fax: [phone]    Open 24 Hours?: No      Medication refill instructions:       If your prescription bottle indicates you have medication refills left, it is not necessary to call your provider’s office. Please contact your pharmacy and they will refill your medication.    If your prescription bottle indicates you do not have any refills left, you may request refills at any time through one of the following ways: The online Live[employer] system (except Urgent Care), by calling your provider’s office, or by asking your pharmacy to contact your provider’s office with a refill request. Medication refills are processed only during regular business hours and may not be available until the next business day. Your provider may request additional information or to have a follow-up visit with you prior to refilling your medication.   *Please Note: Medication refills are assigned a new Rx number when refilled electronically. Your pharmacy may indicate that no refills were authorized even though a new prescription for the same medication is available at the pharmacy. Please request the medicine by name with the pharmacy before contacting your provider for a refill.        Your To Do List     Future Labs/Procedures Complete By Expires    THINPREP RFLX HPV ASCUS W/CTNG  As directed 6/28/2018         LiveRail Access Code: Activation code not generated  Current LiveRail Status: Active

## 2017-06-29 LAB
C TRACH DNA GENITAL QL NAA+PROBE: NEGATIVE
CYTOLOGY REG CYTOL: NORMAL
N GONORRHOEA DNA GENITAL QL NAA+PROBE: NEGATIVE
SPECIMEN SOURCE: NORMAL
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
## 2017-09-20 ENCOUNTER — HOSPITAL ENCOUNTER (OUTPATIENT)
Facility: MEDICAL CENTER | Age: 27
End: 2017-09-20
Attending: FAMILY MEDICINE
Payer: COMMERCIAL

## 2017-09-20 ENCOUNTER — OFFICE VISIT (OUTPATIENT)
Dept: URGENT CARE | Facility: CLINIC | Age: 27
End: 2017-09-20
Payer: COMMERCIAL

## 2017-09-20 VITALS
HEART RATE: 83 BPM | RESPIRATION RATE: 16 BRPM | BODY MASS INDEX: 23.55 KG/M2 | OXYGEN SATURATION: 98 % | SYSTOLIC BLOOD PRESSURE: 120 MMHG | WEIGHT: 159 LBS | HEIGHT: 69 IN | TEMPERATURE: 99 F | DIASTOLIC BLOOD PRESSURE: 90 MMHG

## 2017-09-20 DIAGNOSIS — N89.8 VAGINAL DISCHARGE: ICD-10-CM

## 2017-09-20 DIAGNOSIS — R30.0 DYSURIA: ICD-10-CM

## 2017-09-20 DIAGNOSIS — R10.9 FLANK PAIN: ICD-10-CM

## 2017-09-20 PROCEDURE — 87086 URINE CULTURE/COLONY COUNT: CPT

## 2017-09-20 PROCEDURE — 99214 OFFICE O/P EST MOD 30 MIN: CPT | Performed by: FAMILY MEDICINE

## 2017-09-20 PROCEDURE — 87510 GARDNER VAG DNA DIR PROBE: CPT

## 2017-09-20 PROCEDURE — 87491 CHLMYD TRACH DNA AMP PROBE: CPT

## 2017-09-20 PROCEDURE — 87660 TRICHOMONAS VAGIN DIR PROBE: CPT

## 2017-09-20 PROCEDURE — 87591 N.GONORRHOEAE DNA AMP PROB: CPT

## 2017-09-20 PROCEDURE — 87480 CANDIDA DNA DIR PROBE: CPT

## 2017-09-20 RX ORDER — PHENAZOPYRIDINE HYDROCHLORIDE 200 MG/1
200 TABLET, FILM COATED ORAL 3 TIMES DAILY PRN
Qty: 6 TAB | Refills: 0 | Status: SHIPPED | OUTPATIENT
Start: 2017-09-20 | End: 2018-01-11

## 2017-09-20 RX ORDER — NITROFURANTOIN 25; 75 MG/1; MG/1
100 CAPSULE ORAL EVERY 12 HOURS
Qty: 10 CAP | Refills: 0 | Status: SHIPPED | OUTPATIENT
Start: 2017-09-20 | End: 2017-09-25

## 2017-09-20 RX ORDER — KETOROLAC TROMETHAMINE 30 MG/ML
60 INJECTION, SOLUTION INTRAMUSCULAR; INTRAVENOUS ONCE
Status: COMPLETED | OUTPATIENT
Start: 2017-09-20 | End: 2017-09-20

## 2017-09-20 RX ORDER — IBUPROFEN 800 MG/1
800 TABLET ORAL EVERY 8 HOURS PRN
Qty: 20 TAB | Refills: 3 | Status: SHIPPED | OUTPATIENT
Start: 2017-09-20 | End: 2019-02-05

## 2017-09-20 RX ADMIN — KETOROLAC TROMETHAMINE 60 MG: 30 INJECTION, SOLUTION INTRAMUSCULAR; INTRAVENOUS at 16:29

## 2017-09-20 ASSESSMENT — ENCOUNTER SYMPTOMS
CHILLS: 0
FOCAL WEAKNESS: 0
FEVER: 0
DIZZINESS: 0

## 2017-09-20 NOTE — PROGRESS NOTES
"Subjective:      Karyn Dalton is a 27 y.o. female who presents with UTI (burning during urination/ lower abdomen pain and right side back pain) and Exposure to STD (std check, is concered)    Chief Complaint   Patient presents with   • UTI     burning during urination/ lower abdomen pain and right side back pain   • Exposure to STD     std check, is concered        - This is a very pleasant 27 y.o. female with complaints of on/off sharp pains suprapubic region and Rt flank x 3 days. No NVFC. Also slight more vaginal discharge then normal past 3 days, wants to be checked for STD.           ALLERGIES:  Review of patient's allergies indicates no known allergies.     PMH:  Past Medical History:   Diagnosis Date   • Anemia    • Asthma    • Migraine         MEDS:    Current Outpatient Prescriptions:   •  ibuprofen (MOTRIN) 800 MG Tab, Take 1 Tab by mouth every 8 hours as needed., Disp: 20 Tab, Rfl: 3  •  phenazopyridine (PYRIDIUM) 200 MG Tab, Take 1 Tab by mouth 3 times a day as needed., Disp: 6 Tab, Rfl: 0  •  nitrofurantoin monohydr macro (MACROBID) 100 MG Cap, Take 1 Cap by mouth every 12 hours for 5 days., Disp: 10 Cap, Rfl: 0  •  levonorgestrel (MIRENA) 20 MCG/24HR IUD, 1 Each by Intrauterine route Once., Disp: , Rfl:     Current Facility-Administered Medications:   •  ketorolac (TORADOL) injection 60 mg, 60 mg, Intramuscular, Once, Armando Hess M.D.    ** I have documented what I find to be significant in regards to past medical, social, family and surgical history  in my HPI or under PMH/PSH/FH review section, otherwise it is contributory **           HPI    Review of Systems   Constitutional: Negative for chills and fever.   Genitourinary: Positive for dysuria.   Neurological: Negative for dizziness and focal weakness.          Objective:     /90   Pulse 83   Temp 37.2 °C (99 °F)   Resp 16   Ht 1.753 m (5' 9\")   Wt 72.1 kg (159 lb)   SpO2 98%   Breastfeeding? No   BMI 23.48 kg/m²  "     Physical Exam   Constitutional: She appears well-developed. No distress.   HENT:   Head: Normocephalic and atraumatic.   Eyes: Conjunctivae are normal.   Neck: Neck supple.   Cardiovascular: Regular rhythm.    No murmur heard.  Pulmonary/Chest: Effort normal. No respiratory distress.   Abdominal: Soft. There is no tenderness.   Neurological: She is alert. She exhibits normal muscle tone.   Skin: Skin is warm and dry.   Psychiatric: She has a normal mood and affect. Judgment normal.   Nursing note and vitals reviewed.  Vaginal exam: unremarkable, IUD in place             Assessment/Plan:         1. Dysuria  POCT Urinalysis    POCT Pregnancy    ketorolac (TORADOL) injection 60 mg    URINE CULTURE(NEW)    ibuprofen (MOTRIN) 800 MG Tab    phenazopyridine (PYRIDIUM) 200 MG Tab    nitrofurantoin monohydr macro (MACROBID) 100 MG Cap   2. Flank pain  ketorolac (TORADOL) injection 60 mg   3. Vaginal discharge  CHLAMYDIA/GC PCR URINE OR SWAB    VAGINAL PATHOGENS DNA PANEL             Dx & d/c instructions discussed w/ patient and/or family members. Follow up w/ Prvt Dr or here in 3-4 days if not getting better, sooner if needed,  ER if worse and UC/PCP unavailable.        Possible side effects (i.e. Rash, GI upset/constipation, sedation, elevation of BP or sugars) of any medications given discussed.

## 2017-09-21 LAB
CANDIDA DNA VAG QL PROBE+SIG AMP: NEGATIVE
G VAGINALIS DNA VAG QL PROBE+SIG AMP: NEGATIVE
T VAGINALIS DNA VAG QL PROBE+SIG AMP: NEGATIVE

## 2017-09-22 LAB
BACTERIA UR CULT: NORMAL
C TRACH DNA SPEC QL NAA+PROBE: NEGATIVE
N GONORRHOEA DNA SPEC QL NAA+PROBE: NEGATIVE
SIGNIFICANT IND 70042: NORMAL
SOURCE SOURCE: NORMAL
SPECIMEN SOURCE: NORMAL

## 2017-10-06 ENCOUNTER — GYNECOLOGY VISIT (OUTPATIENT)
Dept: OBGYN | Facility: MEDICAL CENTER | Age: 27
End: 2017-10-06
Payer: COMMERCIAL

## 2017-10-06 VITALS
DIASTOLIC BLOOD PRESSURE: 64 MMHG | BODY MASS INDEX: 23.51 KG/M2 | SYSTOLIC BLOOD PRESSURE: 100 MMHG | WEIGHT: 158.7 LBS | HEIGHT: 69 IN

## 2017-10-06 DIAGNOSIS — K62.5 RECTAL BLEEDING: ICD-10-CM

## 2017-10-06 DIAGNOSIS — F43.10 PTSD (POST-TRAUMATIC STRESS DISORDER): ICD-10-CM

## 2017-10-06 DIAGNOSIS — F41.9 ANXIETY: ICD-10-CM

## 2017-10-06 DIAGNOSIS — R10.30 LOWER ABDOMINAL PAIN: ICD-10-CM

## 2017-10-06 LAB
APPEARANCE UR: NORMAL
BILIRUB UR STRIP-MCNC: NORMAL MG/DL
COLOR UR AUTO: NORMAL
GLUCOSE UR STRIP.AUTO-MCNC: NEGATIVE MG/DL
KETONES UR STRIP.AUTO-MCNC: NEGATIVE MG/DL
LEUKOCYTE ESTERASE UR QL STRIP.AUTO: NEGATIVE
NITRITE UR QL STRIP.AUTO: NEGATIVE
PH UR STRIP.AUTO: 6.6 [PH] (ref 5–8)
PROT UR QL STRIP: NEGATIVE MG/DL
RBC UR QL AUTO: NEGATIVE
SP GR UR STRIP.AUTO: 1.02
UROBILINOGEN UR STRIP-MCNC: NORMAL MG/DL

## 2017-10-06 PROCEDURE — 99213 OFFICE O/P EST LOW 20 MIN: CPT | Performed by: OBSTETRICS & GYNECOLOGY

## 2017-10-06 PROCEDURE — 81002 URINALYSIS NONAUTO W/O SCOPE: CPT | Performed by: OBSTETRICS & GYNECOLOGY
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
## 2017-10-14 ENCOUNTER — HOSPITAL ENCOUNTER (OUTPATIENT)
Dept: LAB | Facility: MEDICAL CENTER | Age: 27
End: 2017-10-14
Attending: OBSTETRICS & GYNECOLOGY
Payer: COMMERCIAL

## 2017-10-14 DIAGNOSIS — R10.30 LOWER ABDOMINAL PAIN: ICD-10-CM

## 2017-10-14 LAB
25(OH)D3 SERPL-MCNC: 34 NG/ML (ref 30–100)
ALBUMIN SERPL BCP-MCNC: 4.7 G/DL (ref 3.2–4.9)
ALBUMIN/GLOB SERPL: 1.8 G/DL
ALP SERPL-CCNC: 71 U/L (ref 30–99)
ALT SERPL-CCNC: 17 U/L (ref 2–50)
ANION GAP SERPL CALC-SCNC: 7 MMOL/L (ref 0–11.9)
AST SERPL-CCNC: 19 U/L (ref 12–45)
BILIRUB SERPL-MCNC: 0.6 MG/DL (ref 0.1–1.5)
BUN SERPL-MCNC: 11 MG/DL (ref 8–22)
CALCIUM SERPL-MCNC: 9.3 MG/DL (ref 8.5–10.5)
CHLORIDE SERPL-SCNC: 103 MMOL/L (ref 96–112)
CHOLEST SERPL-MCNC: 152 MG/DL (ref 100–199)
CO2 SERPL-SCNC: 25 MMOL/L (ref 20–33)
CREAT SERPL-MCNC: 0.72 MG/DL (ref 0.5–1.4)
ERYTHROCYTE [DISTWIDTH] IN BLOOD BY AUTOMATED COUNT: 40.4 FL (ref 35.9–50)
GFR SERPL CREATININE-BSD FRML MDRD: >60 ML/MIN/1.73 M 2
GLOBULIN SER CALC-MCNC: 2.6 G/DL (ref 1.9–3.5)
GLUCOSE SERPL-MCNC: 87 MG/DL (ref 65–99)
HCT VFR BLD AUTO: 40.9 % (ref 37–47)
HDLC SERPL-MCNC: 44 MG/DL
HGB BLD-MCNC: 13.9 G/DL (ref 12–16)
LDLC SERPL CALC-MCNC: 95 MG/DL
MCH RBC QN AUTO: 31.3 PG (ref 27–33)
MCHC RBC AUTO-ENTMCNC: 34 G/DL (ref 33.6–35)
MCV RBC AUTO: 92.1 FL (ref 81.4–97.8)
PLATELET # BLD AUTO: 360 K/UL (ref 164–446)
PMV BLD AUTO: 9.8 FL (ref 9–12.9)
POTASSIUM SERPL-SCNC: 4.1 MMOL/L (ref 3.6–5.5)
PROT SERPL-MCNC: 7.3 G/DL (ref 6–8.2)
RBC # BLD AUTO: 4.44 M/UL (ref 4.2–5.4)
SODIUM SERPL-SCNC: 135 MMOL/L (ref 135–145)
T4 FREE SERPL-MCNC: 0.93 NG/DL (ref 0.53–1.43)
TRIGL SERPL-MCNC: 66 MG/DL (ref 0–149)
TSH SERPL DL<=0.005 MIU/L-ACNC: 3.27 UIU/ML (ref 0.3–3.7)
WBC # BLD AUTO: 6 K/UL (ref 4.8–10.8)

## 2017-10-14 PROCEDURE — 82306 VITAMIN D 25 HYDROXY: CPT

## 2017-10-14 PROCEDURE — 84443 ASSAY THYROID STIM HORMONE: CPT

## 2017-10-14 PROCEDURE — 80061 LIPID PANEL: CPT

## 2017-10-14 PROCEDURE — 80053 COMPREHEN METABOLIC PANEL: CPT

## 2017-10-14 PROCEDURE — 84439 ASSAY OF FREE THYROXINE: CPT

## 2017-10-14 PROCEDURE — 85027 COMPLETE CBC AUTOMATED: CPT

## 2017-10-14 PROCEDURE — 36415 COLL VENOUS BLD VENIPUNCTURE: CPT

## 2018-01-11 ENCOUNTER — OFFICE VISIT (OUTPATIENT)
Dept: BEHAVIORAL HEALTH | Facility: PHYSICIAN GROUP | Age: 28
End: 2018-01-11
Payer: COMMERCIAL

## 2018-01-11 VITALS
BODY MASS INDEX: 23.7 KG/M2 | WEIGHT: 160 LBS | HEIGHT: 69 IN | HEART RATE: 86 BPM | DIASTOLIC BLOOD PRESSURE: 90 MMHG | OXYGEN SATURATION: 96 % | SYSTOLIC BLOOD PRESSURE: 144 MMHG

## 2018-01-11 DIAGNOSIS — F41.1 GAD (GENERALIZED ANXIETY DISORDER): Primary | ICD-10-CM

## 2018-01-11 DIAGNOSIS — F10.10 ALCOHOL ABUSE: ICD-10-CM

## 2018-01-11 PROBLEM — F43.10 PTSD (POST-TRAUMATIC STRESS DISORDER): Status: RESOLVED | Noted: 2017-06-28 | Resolved: 2018-01-11

## 2018-01-11 PROCEDURE — 90792 PSYCH DIAG EVAL W/MED SRVCS: CPT | Performed by: NURSE PRACTITIONER

## 2018-01-11 RX ORDER — ESCITALOPRAM OXALATE 10 MG/1
10 TABLET ORAL EVERY MORNING
Qty: 30 TAB | Refills: 1 | Status: SHIPPED | OUTPATIENT
Start: 2018-01-11 | End: 2018-03-04 | Stop reason: SDUPTHER

## 2018-01-11 RX ORDER — CHOLECALCIFEROL (VITAMIN D3) 125 MCG
10 CAPSULE ORAL
COMMUNITY
End: 2018-03-19
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
## 2018-03-05 RX ORDER — ESCITALOPRAM OXALATE 10 MG/1
10 TABLET ORAL EVERY MORNING
Qty: 30 TAB | Refills: 0 | Status: SHIPPED | OUTPATIENT
Start: 2018-03-05 | End: 2018-03-19 | Stop reason: SDUPTHER

## 2018-03-12 ENCOUNTER — OFFICE VISIT (OUTPATIENT)
Dept: URGENT CARE | Facility: CLINIC | Age: 28
End: 2018-03-12

## 2018-03-12 ENCOUNTER — NON-PROVIDER VISIT (OUTPATIENT)
Dept: URGENT CARE | Facility: CLINIC | Age: 28
End: 2018-03-12

## 2018-03-12 DIAGNOSIS — Z01.89 RESPIRATORY CLEARANCE EXAMINATION, ENCOUNTER FOR: ICD-10-CM

## 2018-03-12 PROCEDURE — 94375 RESPIRATORY FLOW VOLUME LOOP: CPT | Performed by: NURSE PRACTITIONER

## 2018-03-12 NOTE — PROGRESS NOTES
Karyn Dalton is a 27 y.o. female here for a non-provider visit for Mask Fit/ Respiratory Clearance    If abnormal was an in office provider notified today (if so, indicate provider)? Yes  Routed to PCP? No

## 2018-03-19 ENCOUNTER — OFFICE VISIT (OUTPATIENT)
Dept: BEHAVIORAL HEALTH | Facility: PHYSICIAN GROUP | Age: 28
End: 2018-03-19
Payer: COMMERCIAL

## 2018-03-19 VITALS — HEIGHT: 69 IN

## 2018-03-19 DIAGNOSIS — F41.1 GAD (GENERALIZED ANXIETY DISORDER): ICD-10-CM

## 2018-03-19 DIAGNOSIS — F10.10 ALCOHOL ABUSE: ICD-10-CM

## 2018-03-19 PROCEDURE — 99213 OFFICE O/P EST LOW 20 MIN: CPT | Performed by: NURSE PRACTITIONER

## 2018-03-19 RX ORDER — ESCITALOPRAM OXALATE 10 MG/1
10 TABLET ORAL EVERY MORNING
Qty: 90 TAB | Refills: 0 | Status: SHIPPED | OUTPATIENT
Start: 2018-03-19 | End: 2018-04-11 | Stop reason: SDUPTHER

## 2018-03-19 NOTE — PROGRESS NOTES
[facility]  PSYCHIATRIC FOLLOW-UP NOTE    Name: Karyn Dalton  MRN: [id number]  : 1990  Age: 27 y.o.  Date of assessment: 3/19/2018  PCP: Marybeth Lugo M.D.  Persons in attendance: Patient  Total face-to-face time: 15 minutes    REASON FOR VISIT/CHIEF COMPLAINT (as stated by Patient):  Karyn Dalton is a 27 y.o., White female, attending follow-up appointment for anxiety and alcohol abuse.      HISTORY OF PRESENT ILLNESS:    Karyn reports she has been doing well with managing her anxiety. If she starts feeling anxious the feeling doesn't last, has been able to leave her old job and start a new one with a positive and hopeful attitude. Sleeping well at night. Verbalizes she has cut down her drinking to one beer or one glass of wine about 3-4 nights/week. Did drink one night with her sister and felt horrible the next day, also saw that problems with her significant other seemed related to her behavior when she had been drinking as well. Denies feeling down or depressed, no suicidal thoughts. Says she is not sure whether or not her Lexapro is working or if it is placebo effect, but she has felt better since she started taking it. She is taking a little break from counseling, but plans on returning once her new job schedule is more regular.     PSYCHOSOCIAL CHANGES SINCE PREVIOUS CONTACT:  No drug use, 4 drinks/week      MEDICATION SIDE EFFECTS: none    REVIEW OF SYSTEMS:     General appearance: casually dressed  female, good grooming/hygiene. Pleasant and cooperative.    Constitutional negative - no fever/chills   Eyes not tested   Ears/Nose/Mouth/Throat not tested   Cardiovascular not tested   Respiratory negative - no shortness of breath or acute distress   Gastrointestinal negative   Genitourinary negative - denies pregnancy, IUD, does not really have menses   Muscular not tested   Integumentary not tested   Neurological not tested   Endocrine not tested  "  Hematologic/Lymphatic not tested       PSYCHIATRIC EXAMINATION/MENTAL STATUS  Ht 1.753 m (5' 9\")   Breastfeeding? No   Participation: Active verbal participation, Attentive, Engaged and Open to feedback  Grooming:Casual and Neat  Orientation: Alert  Eye contact: Good  Behavior:Calm   Mood: Euthymic  Affect: Full range and Congruent with content  Thought process: Logical and Goal-directed  Thought content:  Within normal limits  Speech: Rate within normal limits and Volume within normal limits  Perception:  Within normal limits  Memory:  No gross evidence of memory deficits  Insight: Good  Judgment: Good  Family/couple interaction observations:   Other:    Current risk:    Suicide: Low   Homicide: Not applicable   Self-harm: Not applicable  Relapse: Low  Other:   Crisis Safety Plan reviewed?No  If evidence of imminent risk is present, intervention/plan:    Medical Records/Labs/Diagnostic Tests Reviewed: none    Medical Records/Labs/Diagnostic Tests Ordered: none    DIAGNOSTIC IMPRESSION(S):  1. DAVID (generalized anxiety disorder)    2. Alcohol abuse           ASSESSMENT AND PLAN:  Anxiety has improved, patient is drinking much less. Continue to encourage her to avoid alcohol or limit to one drink on occasion as it has stronger effect with her antidepressant.   -continue current medication. Discussed with patient she can consider tapering off of Lexapro in one year, that working with counselor can help concept of not needing antidepressant therapy long term as she has a goal of not needing medication.      Current Outpatient Prescriptions:   •  escitalopram (LEXAPRO) 10 MG Tab, Take 1 Tab by mouth every morning., Disp: 90 Tab, Rfl: 0  •  levonorgestrel (MIRENA) 20 MCG/24HR IUD, 1 Each by Intrauterine route Once., Disp: , Rfl:   •  ibuprofen (MOTRIN) 800 MG Tab, Take 1 Tab by mouth every 8 hours as needed., Disp: 20 Tab, Rfl: 3    Recheck 3 months or sooner if needed    RUDOLPH Ohara "

## 2018-04-11 RX ORDER — ESCITALOPRAM OXALATE 10 MG/1
10 TABLET ORAL EVERY MORNING
Qty: 90 TAB | Refills: 0 | Status: SHIPPED | OUTPATIENT
Start: 2018-04-11 | End: 2018-06-15 | Stop reason: SDUPTHER

## 2018-04-30 ENCOUNTER — OFFICE VISIT (OUTPATIENT)
Dept: URGENT CARE | Facility: CLINIC | Age: 28
End: 2018-04-30
Payer: COMMERCIAL

## 2018-04-30 ENCOUNTER — APPOINTMENT (OUTPATIENT)
Dept: RADIOLOGY | Facility: IMAGING CENTER | Age: 28
End: 2018-04-30
Attending: NURSE PRACTITIONER
Payer: COMMERCIAL

## 2018-04-30 VITALS
HEART RATE: 78 BPM | DIASTOLIC BLOOD PRESSURE: 80 MMHG | SYSTOLIC BLOOD PRESSURE: 120 MMHG | HEIGHT: 69 IN | BODY MASS INDEX: 22.96 KG/M2 | OXYGEN SATURATION: 96 % | TEMPERATURE: 97.7 F | RESPIRATION RATE: 20 BRPM | WEIGHT: 155 LBS

## 2018-04-30 DIAGNOSIS — M79.675 TOE PAIN, LEFT: ICD-10-CM

## 2018-04-30 PROCEDURE — 99214 OFFICE O/P EST MOD 30 MIN: CPT | Performed by: NURSE PRACTITIONER

## 2018-04-30 PROCEDURE — 73660 X-RAY EXAM OF TOE(S): CPT | Mod: TC,FY,LT | Performed by: NURSE PRACTITIONER

## 2018-04-30 ASSESSMENT — ENCOUNTER SYMPTOMS
COUGH: 0
SHORTNESS OF BREATH: 0
CHILLS: 0
FEVER: 0
HEADACHES: 0
PALPITATIONS: 0
SORE THROAT: 0
MYALGIAS: 0
DIZZINESS: 0
NAUSEA: 0
DIARRHEA: 0
VOMITING: 0

## 2018-05-01 NOTE — PATIENT INSTRUCTIONS
Bunion (Hallux Valgus)  A bony bump (protrusion) on the inside of the foot, at the base of the first toe, is called a bunion (hallux valgus). A bunion causes the first toe to angle toward the other toes.  SYMPTOMS   · A bony bump on the inside of the foot, causing an outward turning of the first toe. It may also overlap the second toe.  · Thickening of the skin (callus) over the bony bump.  · Fluid buildup under the callus. Fluid may become red, tender, and swollen (inflamed) with constant irritation or pressure.  · Foot pain and stiffness.  CAUSES   Many causes exist, including:  · Inherited from your family (genetics).  · Injury (trauma) forcing the first toe into a position in which it overlaps other toes.  · Bunions are also associated with wearing shoes that have a narrow toe box (pointy shoes).  RISK INCREASES WITH:  · Family history of foot abnormalities, especially bunions.  · Arthritis.  · Narrow shoes, especially high heels.  PREVENTION  · Wear shoes with a wide toe box.  · Avoid shoes with high heels.  · Wear a small pad between the big toe and second toe.  · Maintain proper conditioning:  ¨ Foot and ankle flexibility.  ¨ Muscle strength and endurance.  PROGNOSIS   With proper treatment, bunions can typically be cured. Occasionally, surgery is required.   RELATED COMPLICATIONS   · Infection of the bunion.  · Arthritis of the first toe.  · Risks of surgery, including infection, bleeding, injury to nerves (numb toe), recurrent bunion, overcorrection (toe points inward), arthritis of the big toe, big toe pointing upward, and bone not healing.  TREATMENT   Treatment first consists of stopping the activities that aggravate the pain, taking pain medicines, and icing to reduce inflammation and pain. Wear shoes with a wide toe box. Shoes can be modified by a shoe repair person to relieve pressure on the bunion, especially if you cannot find shoes with a wide enough toe box. You may also place a pad with the  center cut out in your shoe, to reduce pressure on the bunion. Sometimes, an arch support (orthotic) may reduce pressure on the bunion and alleviate the symptoms. Stretching and strengthening exercises for the muscles of the foot may be useful. You may choose to wear a brace or pad at night to hold the big toe away from the second toe. If non-surgical treatments are not successful, surgery may be needed. Surgery involves removing the overgrown tissue and correcting the position of the first toe, by realigning the bones. Bunion surgery is typically performed on an outpatient basis, meaning you can go home the same day as surgery. The surgery may involve cutting the mid portion of the bone of the first toe, or just cutting and repairing (reconstructing) the ligaments and soft tissues around the first toe.   MEDICATION   · If pain medicine is needed, nonsteroidal anti-inflammatory medicines, such as aspirin and ibuprofen, or other minor pain relievers, such as acetaminophen, are often recommended.  · Do not take pain medicine for 7 days before surgery.  · Prescription pain relievers are usually only prescribed after surgery. Use only as directed and only as much as you need.  · Ointments applied to the skin may be helpful.  HEAT AND COLD  · Cold treatment (icing) relieves pain and reduces inflammation. Cold treatment should be applied for 10 to 15 minutes every 2 to 3 hours for inflammation and pain and immediately after any activity that aggravates your symptoms. Use ice packs or an ice massage.  · Heat treatment may be used prior to performing the stretching and strengthening activities prescribed by your caregiver, physical therapist, or . Use a heat pack or a warm soak.  SEEK MEDICAL CARE IF:   · Symptoms get worse or do not improve in 2 weeks, despite treatment.  · After surgery, you develop fever, increasing pain, redness, swelling, drainage of fluids, bleeding, or increasing warmth around the  surgical area.  · New, unexplained symptoms develop. (Drugs used in treatment may produce side effects.)     This information is not intended to replace advice given to you by your health care provider. Make sure you discuss any questions you have with your health care provider.     Document Released: 12/18/2006 Document Revised: 03/11/2013 Document Reviewed: 04/01/2010  [employer] Patient Education ©2016 [employer]

## 2018-05-01 NOTE — PROGRESS NOTES
"Subjective:      Karyn Dalton is a 27 y.o. female who presents with Toe Injury (Couple days left bigtoe swollen and painful.)    Chief Complaint   Patient presents with   • Toe Injury     Couple days left bigtoe swollen and painful.     Painful 1st great toe after stubbing it  Hurts to walk  No hx of bunion that she knew about  No previous fracture          HPI    Review of Systems   Constitutional: Negative for chills, fever and malaise/fatigue.   HENT: Negative for congestion and sore throat.    Respiratory: Negative for cough and shortness of breath.    Cardiovascular: Negative for chest pain and palpitations.   Gastrointestinal: Negative for diarrhea, nausea and vomiting.   Genitourinary: Negative for dysuria.   Musculoskeletal: Positive for joint pain. Negative for myalgias.   Skin: Negative for rash.   Neurological: Negative for dizziness and headaches.       Past Medical History:   Diagnosis Date   • Anemia    • Anxiety    • Asthma    • Migraine    • Physiological ovarian cysts      Family history reviewed and not related to this visit  Social History     Social History   • Marital status: Single     Spouse name: N/A   • Number of children: N/A   • Years of education: N/A     Occupational History   • Not on file.     Social History Main Topics   • Smoking status: Never Smoker   • Smokeless tobacco: Never Used   • Alcohol use Yes      Comment: 2 drinks a few nights/week, history of heavy use   • Drug use: No   • Sexual activity: Yes     Partners: Male     Birth control/ protection: IUD     Other Topics Concern   • Not on file     Social History Narrative   • No narrative on file        Objective:     /80   Pulse 78   Temp 36.5 °C (97.7 °F)   Resp 20   Ht 1.753 m (5' 9\")   Wt 70.3 kg (155 lb)   SpO2 96%   BMI 22.89 kg/m²      Physical Exam   Constitutional: She is oriented to person, place, and time. She appears well-developed and well-nourished. No distress.   HENT:   Head: Normocephalic " and atraumatic.   Eyes: Conjunctivae are normal.   Cardiovascular: Normal rate, regular rhythm and normal heart sounds.    Pulmonary/Chest: Effort normal and breath sounds normal.   Abdominal: Soft.   Musculoskeletal: Normal range of motion. She exhibits tenderness.        Left foot: There is tenderness and bony tenderness.        Feet:    Neurological: She is alert and oriented to person, place, and time.   Skin: Skin is warm and dry.   Psychiatric: She has a normal mood and affect. Her behavior is normal. Judgment and thought content normal.   Nursing note and vitals reviewed.         I also noted the bunion on my review of the xray   Impression       1.  No fracture or dislocation of LEFT great toe.  2.  Hallux valgus with bunion formation.       Assessment/Plan:     1. Toe pain, left     - DX-TOE(S) 2+ LEFT; Future    Discussed bunion with pt.   Rest, ice, NSAIDS    Please make an appointment for follow up with your primary care provider or make appointment to establish with a primary care. Return for any perception of change in condition, worsening of symptoms, such as perception of worse pain, worsening fever, not getting better, or any other concerns. Please go to the nearest emergency room for any shortness of breath or chest pain or for any of the emergent conditions we have discussed. Patient and/or family states understanding to plan of care, and discharge instructions. Different diagnosis were discussed and signs/symptoms were discussed to educate on.

## 2018-06-15 ENCOUNTER — OFFICE VISIT (OUTPATIENT)
Dept: BEHAVIORAL HEALTH | Facility: PHYSICIAN GROUP | Age: 28
End: 2018-06-15
Payer: COMMERCIAL

## 2018-06-15 VITALS — HEIGHT: 69 IN | BODY MASS INDEX: 22.36 KG/M2 | WEIGHT: 151 LBS

## 2018-06-15 DIAGNOSIS — F10.10 ALCOHOL ABUSE: ICD-10-CM

## 2018-06-15 DIAGNOSIS — F41.1 GAD (GENERALIZED ANXIETY DISORDER): ICD-10-CM

## 2018-06-15 PROCEDURE — 99213 OFFICE O/P EST LOW 20 MIN: CPT | Performed by: NURSE PRACTITIONER

## 2018-06-15 RX ORDER — ESCITALOPRAM OXALATE 10 MG/1
10 TABLET ORAL EVERY MORNING
Qty: 90 TAB | Refills: 0 | Status: SHIPPED | OUTPATIENT
Start: 2018-06-15 | End: 2018-09-30 | Stop reason: SDUPTHER

## 2018-06-15 ASSESSMENT — PATIENT HEALTH QUESTIONNAIRE - PHQ9
1. LITTLE INTEREST OR PLEASURE IN DOING THINGS: 0
SUM OF ALL RESPONSES TO PHQ9 QUESTIONS 1 AND 2: 0
2. FEELING DOWN, DEPRESSED, IRRITABLE, OR HOPELESS: 0

## 2018-06-15 NOTE — PROGRESS NOTES
[facility]  PSYCHIATRIC FOLLOW-UP NOTE    Name: Karyn Dalton  MRN: [id number]  : 1990  Age: 27 y.o.  Date of assessment: 6/15/2018  PCP: Marybeth Lugo M.D.  Persons in attendance: Patient  Total face-to-face time: 20 minutes    REASON FOR VISIT/CHIEF COMPLAINT (as stated by Patient):  Karyn Dalton is a 27 y.o., White female, attending follow-up appointment for anxiety and check of drinking habits.      HISTORY OF PRESENT ILLNESS:    Karyn reports overall her anxiety has been well controlled, is not obsessing about things that worry her and feels a big benefit of taking her medication. She did have an anxious event this week that caused her to lose some sleep when a coworker told her he is falling in love with her, despite knowing she is in a committed relationship and has only been a friend; caused her not to sleep well that night. She denies feeling down or depressed. She verbalizes drinking more than she should again over the past couple of weeks, is finding herself drinking 3 drinks/night many nights/week. Work has been somewhat stressful and her new boss requires a lot of hours for her salaried position. She is considering stopping drinking again, has been able to before and is aware it could be problematic for her. She is not experiencing symptoms of withdrawal when she does not drink. Does not really feel AA would be appropriate for her either. Not currently seeing her therapist, plans on checking in with her again soon. Denies suicidal thoughts.     PSYCHOSOCIAL CHANGES SINCE PREVIOUS CONTACT:  Lives with boyfriend, employed full time. Drinking alcohol 3 drinks/night several nights/week      MEDICATION SIDE EFFECTS: none    REVIEW OF SYSTEMS:      General appearance: casually dressed  female, good grooming/hygiene. Pleasant and cooperative.   Constitutional negative - no fever/chills   Eyes not tested   Ears/Nose/Mouth/Throat not tested  "  Cardiovascular not tested   Respiratory negative - no shortness of breath of acute distress   Gastrointestinal negative - denies GI upset   Genitourinary negative - not pregnant   Muscular not tested   Integumentary not tested   Neurological negative   Endocrine not tested   Hematologic/Lymphatic not tested       PSYCHIATRIC EXAMINATION/MENTAL STATUS  Ht 1.753 m (5' 9\")   Wt 68.5 kg (151 lb)   Breastfeeding? No   BMI 22.30 kg/m²   Participation: Active verbal participation, Attentive, Engaged and Open to feedback  Grooming:Casual and Neat  Orientation: Fully Oriented  Eye contact: Good  Behavior:Calm   Mood: Euthymic  Affect: Full range and Congruent with content  Thought process: Logical and Goal-directed  Thought content:  Within normal limits  Speech: Rate within normal limits and Volume within normal limits  Perception:  Within normal limits  Memory:  No gross evidence of memory deficits  Insight: Good  Judgment: Good  Family/couple interaction observations:   Other:    Current risk:    Suicide: Low   Homicide: Not applicable   Self-harm: Not applicable  Relapse: Low  Other:   Crisis Safety Plan reviewed?No  If evidence of imminent risk is present, intervention/plan:    Medical Records/Labs/Diagnostic Tests Reviewed:   Depression Screen (PHQ-2/PHQ-9) 6/15/2018   PHQ-2 Total Score 0       Interpretation of PHQ-9 Total Score   Score Severity   1-4 No Depression   5-9 Mild Depression   10-14 Moderate Depression   15-19 Moderately Severe Depression   20-27 Severe Depression      Medical Records/Labs/Diagnostic Tests Ordered: denies    DIAGNOSTIC IMPRESSION(S):  1. DAVID (generalized anxiety disorder)    2. Alcohol abuse           ASSESSMENT AND PLAN:  -increased use of alcohol, some evidence of self medication. Cautioned patient stronger effect of alcohol while taking her antidepressant, needs to cut back or eliminate alcohol to one drink on occasion only. Reviewed options, can return to her counselor, can try " [facility] if AA is not really for her. She is not quite in stage to make a change, is contemplating working on her use of alcohol. Discussed possible trial of naltrexone when she is ready to committ to quitting. Patient encouraged to do some research on naltrexone, and to see provider if she wants to pursue with a trial (would need to check liver enzymes prior to starting)  -anxiety well managed with medication. Normalized her anxious reaction to her coworker's feelings.   -continue with Lexapro at current dose.       Current Outpatient Prescriptions:   •  escitalopram (LEXAPRO) 10 MG Tab, Take 1 Tab by mouth every morning., Disp: 90 Tab, Rfl: 0  •  levonorgestrel (MIRENA) 20 MCG/24HR IUD, 1 Each by Intrauterine route Once., Disp: , Rfl:   •  ibuprofen (MOTRIN) 800 MG Tab, Take 1 Tab by mouth every 8 hours as needed., Disp: 20 Tab, Rfl: 3    Recheck 3 months or sooner if needed      KARLENE Ohara.

## 2018-09-11 ENCOUNTER — APPOINTMENT (OUTPATIENT)
Dept: BEHAVIORAL HEALTH | Facility: PHYSICIAN GROUP | Age: 28
End: 2018-09-11
Payer: COMMERCIAL

## 2018-10-01 RX ORDER — ESCITALOPRAM OXALATE 10 MG/1
TABLET ORAL
Qty: 90 TAB | Refills: 0 | Status: SHIPPED | OUTPATIENT
Start: 2018-10-01 | End: 2019-01-07

## 2018-11-05 ENCOUNTER — APPOINTMENT (OUTPATIENT)
Dept: BEHAVIORAL HEALTH | Facility: CLINIC | Age: 28
End: 2018-11-05
Payer: COMMERCIAL

## 2019-01-07 ENCOUNTER — OFFICE VISIT (OUTPATIENT)
Dept: BEHAVIORAL HEALTH | Facility: CLINIC | Age: 29
End: 2019-01-07
Payer: COMMERCIAL

## 2019-01-07 VITALS
DIASTOLIC BLOOD PRESSURE: 78 MMHG | RESPIRATION RATE: 16 BRPM | HEART RATE: 80 BPM | SYSTOLIC BLOOD PRESSURE: 118 MMHG | HEIGHT: 69 IN | WEIGHT: 155 LBS | BODY MASS INDEX: 22.96 KG/M2

## 2019-01-07 DIAGNOSIS — F41.1 GAD (GENERALIZED ANXIETY DISORDER): ICD-10-CM

## 2019-01-07 DIAGNOSIS — F10.20 ALCOHOL USE DISORDER, MODERATE, DEPENDENCE (HCC): ICD-10-CM

## 2019-01-07 PROCEDURE — 99213 OFFICE O/P EST LOW 20 MIN: CPT | Performed by: PSYCHIATRY & NEUROLOGY

## 2019-01-07 PROCEDURE — 90833 PSYTX W PT W E/M 30 MIN: CPT | Performed by: PSYCHIATRY & NEUROLOGY

## 2019-01-07 ASSESSMENT — PATIENT HEALTH QUESTIONNAIRE - PHQ9: CLINICAL INTERPRETATION OF PHQ2 SCORE: 0

## 2019-01-07 NOTE — BH THERAPY
[facility]  PSYCHIATRIC FOLLOW-UP NOTE    Name: Karyn Dalton  MRN: [id number]  : 1990  Age: 28 y.o.  Date of assessment: 2019  PCP: Marybeth Chisholm M.D.  Persons in attendance: Patient  Total face-to-face time: 30 minutes    REASON FOR VISIT/CHIEF COMPLAINT (as stated by Patient):  Karyn Dalton is a 28 y.o., White female, attending follow-up appointment for   Chief Complaint   Patient presents with   • Medication Management     I have been feeling well and I want to get off from lexapro. I have been on it for one year now.    .      HISTORY OF PRESENT ILLNESS:    This is a 29 yo female, seen today for follow up first time by this writer after six months. The patient had been seeing Itzel and she has been taking lexapro 10 mg for anxiety. The patient was not using it every day . The patient came to see Itzel one year ago because of her drinking and anxiety. The patient stopped obesessing on things and her worries and anxiety improved. The patient wants to get off from lexapro and she wants to see if she needed it because she has been on it for one year. The patient has a history of drinking and her last drink was yesterday. The patient got drunk on  with her friends and she blacked out. The patient denied history of DUI. The patient denied symptoms of ruby, hypomania, hallucinations, delusions and paranoia. The patient denied history of suicide attempts.The patient is changing her therapist.    PSYCHOSOCIAL CHANGES SINCE PREVIOUS CONTACT:  The patient denied anxiety and denied obsessing on things any more.  The patient is drinking alcohol and she is on denial.      RESPONSE TO TREATMENT:  She is taking lexapro not every day. She wants to get of from it.    MEDICATION SIDE EFFECTS:  Denied.    REVIEW OF SYSTEMS:        Constitutional negative   Eyes negative   Ears/Nose/Mouth/Throat negative   Cardiovascular negative   Respiratory positive - asthma  "  Gastrointestinal negative   Genitourinary positive - history of ovarian cyst.   Muscular negative   Integumentary negative   Neurological positive - migraine   Endocrine negative   Hematologic/Lymphatic positive - anemia.       PSYCHIATRIC EXAMINATION/MENTAL STATUS  /78   Pulse 80   Resp 16   Ht 1.753 m (5' 9.02\")   Wt 70.3 kg (155 lb)   BMI 22.88 kg/m²   Participation: Active verbal participation, Attentive and Engaged  Grooming:Neat  Orientation: Alert and Fully Oriented  Eye contact: Good  Behavior:Calm   Mood: Euthymic  Affect: Flexible and Full range  Thought process: Logical and Goal-directed  Thought content:  Within normal limits  Speech: Rate within normal limits and Volume within normal limits  Perception:  Within normal limits  Memory:  No gross evidence of memory deficits  Insight: Good  Judgment: Good  Family/couple interaction observations:   Other:    Current risk:    Suicide: Low   Homicide: Low   Self-harm: Low  Relapse: Low  Other:   Crisis Safety Plan reviewed?Yes  If evidence of imminent risk is present, intervention/plan:    Medical Records/Labs/Diagnostic Tests Reviewed: yes.    Medical Records/Labs/Diagnostic Tests Ordered: none.    DIAGNOSTIC IMPRESSION(S):  1. DAVID (generalized anxiety disorder)    2. Alcohol use disorder, moderate, dependence (HCC)           ASSESSMENT AND PLAN:    1. DAVID.  Decrease lexapro 10 mg half every day for one weeks then stop.  The patient agreed to call if her symptoms are coming back.    2. Alcohol use disorder.  Last drink last night.  restart therapy.  Encouraged her to start AA meetings.     3. Agreed to call if symptoms are coming back.    17. minutes were spent in psychotherapy.  (If greater than 16 minutes, add 62595 code)   Topics addressed in psychotherapy include:  We discussed termination of therapy.  Psycho education and cognitive clarifications.  Encouraged her to do meditation and relaxation method.  Gavin Pruitt M.D. "

## 2019-01-10 ENCOUNTER — GYNECOLOGY VISIT (OUTPATIENT)
Dept: OBGYN | Facility: MEDICAL CENTER | Age: 29
End: 2019-01-10
Payer: COMMERCIAL

## 2019-01-10 ENCOUNTER — HOSPITAL ENCOUNTER (OUTPATIENT)
Facility: MEDICAL CENTER | Age: 29
End: 2019-01-10
Attending: OBSTETRICS & GYNECOLOGY
Payer: COMMERCIAL

## 2019-01-10 VITALS
SYSTOLIC BLOOD PRESSURE: 103 MMHG | DIASTOLIC BLOOD PRESSURE: 68 MMHG | HEIGHT: 69 IN | BODY MASS INDEX: 21.88 KG/M2 | WEIGHT: 147.71 LBS

## 2019-01-10 DIAGNOSIS — Z11.51 SCREENING FOR HUMAN PAPILLOMAVIRUS (HPV): ICD-10-CM

## 2019-01-10 DIAGNOSIS — Z11.3 SCREENING FOR STDS (SEXUALLY TRANSMITTED DISEASES): ICD-10-CM

## 2019-01-10 DIAGNOSIS — Z12.4 PAP SMEAR FOR CERVICAL CANCER SCREENING: ICD-10-CM

## 2019-01-10 DIAGNOSIS — Z01.419 WELL WOMAN EXAM WITH ROUTINE GYNECOLOGICAL EXAM: ICD-10-CM

## 2019-01-10 PROCEDURE — 88175 CYTOPATH C/V AUTO FLUID REDO: CPT

## 2019-01-10 PROCEDURE — 87491 CHLMYD TRACH DNA AMP PROBE: CPT

## 2019-01-10 PROCEDURE — 87624 HPV HI-RISK TYP POOLED RSLT: CPT

## 2019-01-10 PROCEDURE — 99395 PREV VISIT EST AGE 18-39: CPT | Performed by: OBSTETRICS & GYNECOLOGY

## 2019-01-10 PROCEDURE — 87591 N.GONORRHOEAE DNA AMP PROB: CPT

## 2019-01-10 NOTE — PROGRESS NOTES
Chief Complaint   Patient presents with   • Annual Exam     annual exam       History of present illness: 28 y.o. presents with above chief complaint. 1 week ago she had a sudden pelvic pain which she associated with ruptured ovarian cyst rating 2/10 but spiking to 10/10 on and off. She took tylenol and naproxen 500mg and it resolved the next day but she wanted to make an appt just in case. Pt has hx of ruptured cysts occurring at 13yo, 17yo, and 27yo. She has had IUD in place since Aug 2014 but is planning on getting pregnant in fall this next year.     Review of systems:  Pertinent positives documented in HPI and all other systems reviewed & are negative  Menstrual: {LMP: 2019; light spotting monthly secondary to Mirena  Constitutional: negative  Respiratory: negative  Cardiovascular: negative  Gastrointestinal: negative for abdominal pain, change in bowel habits and constipation  Genitourinary:negative  Integument/breast: negative  Hematologic/lymphatic: negative  Musculoskeletal:negative  Neurological: negative  Behavioral/Psych: negative  Endocrine: negative  Allergic/Immunologic: negative    POBHx:   OB History    Para Term  AB Living   0 0 0 0 0 0   SAB TAB Ectopic Molar Multiple Live Births   0 0 0   0             PGYNHx: paps normal; last one 2017. Hx ovarian cysts since adolescence. Denies STDs.    All PMH, PSH, allergies, social history and FH reviewed and updated today:  Past Medical History:   Diagnosis Date   • Anemia    • Anxiety    • Asthma    • Migraine    • Physiological ovarian cysts        No past surgical history on file.    Allergies: No Known Allergies    Social History     Social History   • Marital status: Single     Spouse name: N/A   • Number of children: N/A   • Years of education: N/A     Occupational History   • Not on file.     Social History Main Topics   • Smoking status: Never Smoker   • Smokeless tobacco: Never Used   • Alcohol use Yes      Comment: 2 drinks a  "few nights/week, history of heavy use   • Drug use: No   • Sexual activity: Yes     Partners: Male     Birth control/ protection: IUD     Other Topics Concern   • Not on file     Social History Narrative   • No narrative on file       Family History   Problem Relation Age of Onset   • Heart Disease Paternal Uncle    • Heart Disease Maternal Grandmother    • Cancer Maternal Grandmother    • Heart Disease Maternal Grandfather    • Alcohol abuse Maternal Grandfather    • Heart Disease Paternal Grandmother    • Heart Disease Paternal Grandfather    • Hypertension Paternal Grandfather    • Diabetes Paternal Grandfather    • Cancer Paternal Grandfather    • Anxiety disorder Mother    • Alcohol abuse Maternal Aunt    • Suicide Attempts Neg Hx    • Schizophrenia Neg Hx    • Bipolar disorder Neg Hx        Physical exam:  Blood pressure 103/68, height 1.753 m (5' 9\"), weight 67 kg (147 lb 11.3 oz), last menstrual period 01/01/2019, not currently breastfeeding.    General:appears stated age, is in no apparent distress, is well developed and well nourished  Neuro: Cranial nerves grossly in tact. No gait abnormalities. Normal ROM.  Head: normocephalic, non-tender  Neck: neck is supple, no jugular venous distension, no thyromegaly  Breast: bilateral normal appearing breasts without lesions. Right nipple inverted slightly. No discharge bilaterally.  CV : regular rate and rhythm, no peripheral edema  Lungs: Normal respiratory effort. Clear to auscultation bilaterally  Abdomen: Bowel sounds positive, nondistended, soft, nontender x4, no rebound or guarding. No organomegaly. No masses.  Female GYN: normal female external genitalia without lesions, normal vagina and normal vaginal tone, IUD strings seen in place  Skin: No rashes, or ulcers or lesions seen  Extremities: non edematous.   Psychiatric: Patient shows appropriate affect, is alert and oriented x3, intact judgment and insight.      Assessment  1. Well woman exam with routine " gynecological exam  THINPREP PAP W/HPV AND CTNG   2. Pap smear for cervical cancer screening  THINPREP PAP W/HPV AND CTNG   3. Screening for human papillomavirus (HPV)  THINPREP PAP W/HPV AND CTNG   4. Screening for STDs (sexually transmitted diseases)  THINPREP PAP W/HPV AND CTNG       Plan  - Pap and physical done  - advised to f/u in August 2019 for removal of IUD. Pt prefers to be on OCP after that as she wishes for fertility after her wedding in October.  - pt with maternal gmother who had breast cancer at 47yo. Pt's mother is 61yo without any abnormal mammograms. Advised on self breast exams and mammo sooner if irregularities.  - Exercise and healthy diet discussed; would start prenatal vitamins before attempting conception.  - Follow up August 2019

## 2019-01-10 NOTE — PROGRESS NOTES
Pt presents for annual exam and to discuss ovarian cyst.Pt has Mirena IUD and thinks it is almost at its 5 year. Pt has had ovarian cysts and thinks she had on elast week, no ER visit, NSAIDS and sx resolved.  Ph# [phone]  Pharm# [id number]

## 2019-01-11 LAB
C TRACH DNA GENITAL QL NAA+PROBE: NEGATIVE
CYTOLOGY REG CYTOL: NORMAL
HPV HR 12 DNA CVX QL NAA+PROBE: NEGATIVE
HPV16 DNA SPEC QL NAA+PROBE: NEGATIVE
HPV18 DNA SPEC QL NAA+PROBE: NEGATIVE
N GONORRHOEA DNA GENITAL QL NAA+PROBE: NEGATIVE
SPECIMEN SOURCE: NORMAL
SPECIMEN SOURCE: NORMAL

## 2019-02-05 ENCOUNTER — OFFICE VISIT (OUTPATIENT)
Dept: MEDICAL GROUP | Facility: MEDICAL CENTER | Age: 29
End: 2019-02-05
Payer: COMMERCIAL

## 2019-02-05 ENCOUNTER — HOSPITAL ENCOUNTER (OUTPATIENT)
Dept: LAB | Facility: MEDICAL CENTER | Age: 29
End: 2019-02-05
Attending: INTERNAL MEDICINE
Payer: COMMERCIAL

## 2019-02-05 VITALS
DIASTOLIC BLOOD PRESSURE: 70 MMHG | WEIGHT: 153.66 LBS | HEIGHT: 69 IN | TEMPERATURE: 98.1 F | SYSTOLIC BLOOD PRESSURE: 98 MMHG | OXYGEN SATURATION: 99 % | BODY MASS INDEX: 22.76 KG/M2 | HEART RATE: 64 BPM | RESPIRATION RATE: 16 BRPM

## 2019-02-05 DIAGNOSIS — Z00.00 WELL ADULT EXAM: ICD-10-CM

## 2019-02-05 DIAGNOSIS — G43.009 MIGRAINE WITHOUT AURA AND WITHOUT STATUS MIGRAINOSUS, NOT INTRACTABLE: ICD-10-CM

## 2019-02-05 DIAGNOSIS — J45.20 MILD INTERMITTENT ASTHMA WITHOUT COMPLICATION: ICD-10-CM

## 2019-02-05 LAB
ALBUMIN SERPL BCP-MCNC: 4.5 G/DL (ref 3.2–4.9)
ALBUMIN/GLOB SERPL: 1.8 G/DL
ALP SERPL-CCNC: 66 U/L (ref 30–99)
ALT SERPL-CCNC: 13 U/L (ref 2–50)
ANION GAP SERPL CALC-SCNC: 7 MMOL/L (ref 0–11.9)
AST SERPL-CCNC: 18 U/L (ref 12–45)
BASOPHILS # BLD AUTO: 1.1 % (ref 0–1.8)
BASOPHILS # BLD: 0.06 K/UL (ref 0–0.12)
BILIRUB SERPL-MCNC: 0.5 MG/DL (ref 0.1–1.5)
BUN SERPL-MCNC: 10 MG/DL (ref 8–22)
CALCIUM SERPL-MCNC: 9.2 MG/DL (ref 8.5–10.5)
CHLORIDE SERPL-SCNC: 105 MMOL/L (ref 96–112)
CO2 SERPL-SCNC: 26 MMOL/L (ref 20–33)
CREAT SERPL-MCNC: 0.71 MG/DL (ref 0.5–1.4)
EOSINOPHIL # BLD AUTO: 0.23 K/UL (ref 0–0.51)
EOSINOPHIL NFR BLD: 4.1 % (ref 0–6.9)
ERYTHROCYTE [DISTWIDTH] IN BLOOD BY AUTOMATED COUNT: 43.7 FL (ref 35.9–50)
FASTING STATUS PATIENT QL REPORTED: NORMAL
GLOBULIN SER CALC-MCNC: 2.5 G/DL (ref 1.9–3.5)
GLUCOSE SERPL-MCNC: 91 MG/DL (ref 65–99)
HCT VFR BLD AUTO: 42.2 % (ref 37–47)
HGB BLD-MCNC: 14.3 G/DL (ref 12–16)
IMM GRANULOCYTES # BLD AUTO: 0.01 K/UL (ref 0–0.11)
IMM GRANULOCYTES NFR BLD AUTO: 0.2 % (ref 0–0.9)
LYMPHOCYTES # BLD AUTO: 1.73 K/UL (ref 1–4.8)
LYMPHOCYTES NFR BLD: 30.6 % (ref 22–41)
MCH RBC QN AUTO: 32.5 PG (ref 27–33)
MCHC RBC AUTO-ENTMCNC: 33.9 G/DL (ref 33.6–35)
MCV RBC AUTO: 95.9 FL (ref 81.4–97.8)
MONOCYTES # BLD AUTO: 0.41 K/UL (ref 0–0.85)
MONOCYTES NFR BLD AUTO: 7.2 % (ref 0–13.4)
NEUTROPHILS # BLD AUTO: 3.22 K/UL (ref 2–7.15)
NEUTROPHILS NFR BLD: 56.8 % (ref 44–72)
NRBC # BLD AUTO: 0 K/UL
NRBC BLD-RTO: 0 /100 WBC
PLATELET # BLD AUTO: 333 K/UL (ref 164–446)
PMV BLD AUTO: 9.4 FL (ref 9–12.9)
POTASSIUM SERPL-SCNC: 4.3 MMOL/L (ref 3.6–5.5)
PROT SERPL-MCNC: 7 G/DL (ref 6–8.2)
RBC # BLD AUTO: 4.4 M/UL (ref 4.2–5.4)
SODIUM SERPL-SCNC: 138 MMOL/L (ref 135–145)
WBC # BLD AUTO: 5.7 K/UL (ref 4.8–10.8)

## 2019-02-05 PROCEDURE — 99385 PREV VISIT NEW AGE 18-39: CPT | Performed by: INTERNAL MEDICINE

## 2019-02-05 PROCEDURE — 85025 COMPLETE CBC W/AUTO DIFF WBC: CPT

## 2019-02-05 PROCEDURE — 80053 COMPREHEN METABOLIC PANEL: CPT

## 2019-02-05 PROCEDURE — 36415 COLL VENOUS BLD VENIPUNCTURE: CPT

## 2019-02-05 NOTE — PROGRESS NOTES
"CC:  Diagnoses of Mild intermittent asthma without complication, Migraine without aura and without status migrainosus, not intractable, and Well adult exam were pertinent to this visit.    HISTORY OF THE PRESENT ILLNESS: Patient is a 28 y.o. female. This pleasant patient is here today to establish care.    She is doing really well without any focal concerns.  She has a history of drinking heavily, history of blackouts but no seizures.  She is working diligently with her counselor and is currently sober.  Mood is doing \"good\" without antidepressant/anxiety medication. Her counselor asked her to have routine labs to check her liver function, etc.    Allergies: Patient has no known allergies.    Current Outpatient Prescriptions Ordered in TagArray   Medication Sig Dispense Refill   • levonorgestrel (MIRENA) 20 MCG/24HR IUD 1 Each by Intrauterine route Once.       No current Epic-ordered facility-administered medications on file.        Past Medical History:   Diagnosis Date   • Anemia    • Anxiety    • Asthma    • Migraine    • Physiological ovarian cysts        Past Surgical History:   Procedure Laterality Date   • OTHER      age 12 hyperhydrosis       Social History   Substance Use Topics   • Smoking status: Never Smoker   • Smokeless tobacco: Never Used   • Alcohol use Yes      Comment: 2 drinks a few nights/week, history of heavy use.         Social History     Social History Narrative   • No narrative on file       Family History   Problem Relation Age of Onset   • Heart Disease Paternal Uncle    • Heart Disease Maternal Grandmother    • Cancer Maternal Grandmother         breast   • Heart Disease Maternal Grandfather    • Alcohol abuse Maternal Grandfather    • Heart Disease Paternal Grandmother    • Cancer Paternal Grandmother         throat, tobacco use   • Heart Disease Paternal Grandfather    • Hypertension Paternal Grandfather    • Diabetes Paternal Grandfather    • Anxiety disorder Mother    • Alcohol abuse " "Maternal Aunt    • Suicide Attempts Neg Hx    • Schizophrenia Neg Hx    • Bipolar disorder Neg Hx        ROS:     - Constitutional: Negative for fever, chills, unexpected weight change, night sweats    - Eyes:   Negative for blurry vission, eye pain, discharge    - ENT:  Negative for hearing changes, ear pain, ear discharge, rhinorrhea, sinus congestion, sore throat     - Respiratory: Negative for cough, sputum production, chest congestion, dyspnea, wheezing, and crackles.      - Cardiovascular: Negative for chest pain, palpitations, orthopnea, and bilateral lower extremity edema.     - Gastrointestinal: Negative for heartburn, nausea, vomiting, abdominal pain, hematochezia, melena, diarrhea, constipation, and greasy/foul-smelling stools.     - Genitourinary: Negative for dysuria, polyuria, hematuria, pyuria, urinary urgency, and urinary incontinence.     - Musculoskeletal: Negative for myalgias, back pain, and joint pain.     - Skin:Negative for rash, itching, cyanotic skin color change.     - Neurological: Negative for migraines, numbness, ataxia, tremors, vertigo    - Endo:Negative for polyuria, heat/cold intolerance, excessive thirst    - Hem/lymphatic: Negative for easy bruising, blood clots, lymphedema, swollen glands    -Allergic/immun: Negative for allergic rhinitis    - Psychiatric/Behavioral: Negative for depression, suicidal/homicidal ideation and memory loss.      Exam: Blood pressure (!) 98/70, pulse 64, temperature 36.7 °C (98.1 °F), temperature source Temporal, resp. rate 16, height 1.753 m (5' 9\"), weight 69.7 kg (153 lb 10.6 oz), SpO2 99 %, not currently breastfeeding. Body mass index is 22.69 kg/m².    General: Normal appearing. No distress.  EYES: Conjunctiva clear lids without ptosis, pupils equal  EARS: Normal shape and contour  Neck: Supple without LAD. Thyroid is not enlarged.  Pulmonary: Clear to ausculation.  Normal effort. No rales or wheezing.  Cardiovascular: Regular rate and rhythm " without significant murmur.   Abdomen: Soft, nontender, nondistended. Normal bowel sounds.  Neurologic: Cranial nerves grossly nonfocal  Lymph: No cervical, supraclavicular nodes palpable  Skin: Warm and dry.  No obvious lesions.  Musculoskeletal: Normal gait. No extremity cyanosis, clubbing, or edema.  Psych: Normal mood and affect. Alert and oriented x3. Judgment and insight is normal.      Assessment/Plan  1. Mild intermittent asthma without complication  Patient indicates she has sports induced asthma, rarely needs an inhaler.  Does not require a new albuterol inhaler.    2. Migraine without aura and without status migrainosus, not intractable  Has not had a migraine in over 3 years, she feels this is potentially a resolved issue at this time.    3. Well adult exam  - CBC WITH DIFFERENTIAL; Future (?  History of anemia possibly, unclear source, patient is not sure, maybe dietary I.e. Not much red meat)  - COMP METABOLIC PANEL; Future      Return to clinic as needed        Please note that this dictation was created using voice recognition software. I have made every reasonable attempt to correct obvious errors, but I expect that there are errors of grammar and possibly content that I did not discover before finalizing the note.

## 2019-07-22 ENCOUNTER — APPOINTMENT (OUTPATIENT)
Dept: MEDICAL GROUP | Facility: MEDICAL CENTER | Age: 29
End: 2019-07-22
Payer: COMMERCIAL

## 2019-07-26 ENCOUNTER — NON-PROVIDER VISIT (OUTPATIENT)
Dept: MEDICAL GROUP | Facility: MEDICAL CENTER | Age: 29
End: 2019-07-26
Payer: COMMERCIAL

## 2019-07-26 ENCOUNTER — TELEPHONE (OUTPATIENT)
Dept: MEDICAL GROUP | Facility: MEDICAL CENTER | Age: 29
End: 2019-07-26

## 2019-07-26 ENCOUNTER — GYNECOLOGY VISIT (OUTPATIENT)
Dept: OBGYN | Facility: MEDICAL CENTER | Age: 29
End: 2019-07-26
Payer: COMMERCIAL

## 2019-07-26 VITALS
DIASTOLIC BLOOD PRESSURE: 78 MMHG | BODY MASS INDEX: 22.36 KG/M2 | WEIGHT: 151 LBS | HEIGHT: 69 IN | SYSTOLIC BLOOD PRESSURE: 100 MMHG

## 2019-07-26 DIAGNOSIS — Z30.431 IUD CHECK UP: ICD-10-CM

## 2019-07-26 DIAGNOSIS — Z23 IMMUNIZATION DUE: ICD-10-CM

## 2019-07-26 PROCEDURE — 90746 HEPB VACCINE 3 DOSE ADULT IM: CPT | Performed by: PHYSICIAN ASSISTANT

## 2019-07-26 PROCEDURE — 99212 OFFICE O/P EST SF 10 MIN: CPT | Performed by: OBSTETRICS & GYNECOLOGY

## 2019-07-26 PROCEDURE — 90732 PPSV23 VACC 2 YRS+ SUBQ/IM: CPT | Performed by: PHYSICIAN ASSISTANT

## 2019-07-26 PROCEDURE — 90472 IMMUNIZATION ADMIN EACH ADD: CPT | Performed by: PHYSICIAN ASSISTANT

## 2019-07-26 PROCEDURE — 90715 TDAP VACCINE 7 YRS/> IM: CPT | Performed by: PHYSICIAN ASSISTANT

## 2019-07-26 PROCEDURE — 90471 IMMUNIZATION ADMIN: CPT | Performed by: PHYSICIAN ASSISTANT

## 2019-07-26 RX ORDER — NORETHINDRONE ACETATE AND ETHINYL ESTRADIOL 1.5-30(21)
1 KIT ORAL DAILY
Qty: 28 TAB | Refills: 11 | Status: SHIPPED | OUTPATIENT
Start: 2019-07-26 | End: 2020-02-19

## 2019-07-26 NOTE — PROGRESS NOTES
"Karyn Dalton is a 28 y.o. female here for a non-provider visit for: Tdap, HepB, PPSV23      Reason for immunization: Overdue/Provider Recommended  Immunization records indicate need for vaccine: Yes, confirmed with Epic  Minimum interval has been met for this vaccine: Yes  ABN completed: Not Indicated    Order and dose verified by: EO  VIS Dated  02/24/2015,10/12/2018 was given to patient: Yes  All IAC Questionnaire questions were answered \"No.\"    Patient tolerated injection and no adverse effects were observed or reported: Yes    Pt scheduled for next dose in series: Not Indicated  "

## 2019-07-26 NOTE — PROGRESS NOTES
Chief Complaint   Patient presents with   • Gynecologic Exam     Discuss IUD Removal       History of present illness: 28 y.o. presents with expiring IUD. Desires OCPs after removal due to desire to become pregnant in the near future. Also complains of possible hemorrhoid since she does have chronic constipation.     Review of systems:  Pertinent positives documented in HPI and all other systems reviewed & are negative    POBHx:   OB History    Para Term  AB Living   0 0 0 0 0 0   SAB TAB Ectopic Molar Multiple Live Births   0 0 0   0             PGYNHx: Menstrual: LMP: irregular due to Mirena    All PMH, PSH, allergies, social history and FH reviewed and updated today:  Past Medical History:   Diagnosis Date   • Anemia    • Anxiety    • Asthma    • Migraine    • Physiological ovarian cysts        Past Surgical History:   Procedure Laterality Date   • OTHER      age 12 hyperhydrosis       Allergies: No Known Allergies    Social History     Social History   • Marital status: Single     Spouse name: N/A   • Number of children: N/A   • Years of education: N/A     Occupational History   • Not on file.     Social History Main Topics   • Smoking status: Never Smoker   • Smokeless tobacco: Never Used   • Alcohol use Yes      Comment: 2 drinks a few nights/week, history of heavy use.     • Drug use: No   • Sexual activity: Yes     Partners: Male     Birth control/ protection: IUD     Other Topics Concern   • Not on file     Social History Narrative   • No narrative on file       Family History   Problem Relation Age of Onset   • Heart Disease Paternal Uncle    • Heart Disease Maternal Grandmother    • Cancer Maternal Grandmother         breast   • Heart Disease Maternal Grandfather    • Alcohol abuse Maternal Grandfather    • Heart Disease Paternal Grandmother    • Cancer Paternal Grandmother         throat, tobacco use   • Heart Disease Paternal Grandfather    • Hypertension Paternal Grandfather    • Diabetes  "Paternal Grandfather    • Anxiety disorder Mother    • Alcohol abuse Maternal Aunt    • Suicide Attempts Neg Hx    • Schizophrenia Neg Hx    • Bipolar disorder Neg Hx        Physical exam:  /78 (BP Location: Right arm, Patient Position: Sitting)   Ht 1.753 m (5' 9\")   Wt 68.5 kg (151 lb)     General:appears stated age, is in no apparent distress  Head: normocephalic, non-tender  Neck: neck is supple, no jugular venous distension, no thyromegaly  Female GYN: normal female external genitalia without lesions; no hemorrhoids  Skin: No rashes, or ulcers or lesions seen  Psychiatric: Patient shows appropriate affect, is alert and oriented x3, intact judgment and insight.    Assessment  1. IUD check up  REFERRAL TO GYNECOLOGY    Consent for Surgery / Procedure     Plan  - 28 y.o.  here for IUD management as it is expiring this year.  - Discussed need referral for removal then will make appt on  due to pt insurance being [place]  - COCs sent to pharmacy. In the event that patient cannot get IUD removed and is past her deadline she is to start OCPs.   - Follow up next week for removal.    "

## 2019-07-26 NOTE — TELEPHONE ENCOUNTER
Patient is on the MA Schedule today for TDAP, HEP B, PNU 23 vaccine/injection.    SPECIFIC Action To Be Taken: Orders pending, please sign.

## 2019-07-31 ENCOUNTER — GYNECOLOGY VISIT (OUTPATIENT)
Dept: OBGYN | Facility: CLINIC | Age: 29
End: 2019-07-31
Payer: COMMERCIAL

## 2019-07-31 VITALS
SYSTOLIC BLOOD PRESSURE: 112 MMHG | HEIGHT: 69 IN | WEIGHT: 156 LBS | BODY MASS INDEX: 23.11 KG/M2 | DIASTOLIC BLOOD PRESSURE: 80 MMHG

## 2019-07-31 DIAGNOSIS — Z30.432 ENCOUNTER FOR IUD REMOVAL: ICD-10-CM

## 2019-07-31 PROCEDURE — 58301 REMOVE INTRAUTERINE DEVICE: CPT | Performed by: OBSTETRICS & GYNECOLOGY

## 2019-07-31 NOTE — PROGRESS NOTES
S: Patient presents for IUD removal. Desires to start OCP. Has RX already    O: VSS, afebrile  Gen - NAD, comfortable  PELVIC EXAM -   Normal external female genitalia  BUS within normal limits, no lesions  Spec: cervix has no lesions, normal physiologic white discharge, IUD strings visualized from cervix  Vaginal wall: pink and moist, normal rugae, no lesions    Procedure - IUD removal:  Pt counseled on IUD removal. Risk for cramping, bleeding discussed.  Consent form signed.  Speculum placed in the vagina and cervix visualized. IUD strings seen. IUD strings grasped with ring forceps and removed intact. Hemostasis noted. Pt tolerated procedure well.    A: Patient here for IUD removal-IUD removed intact without difficulty and patient tolerated procedure well    P: Patient will start OCP-she already has prescription  Patient advised to start prenatal vitamin daily  Precautions and plan of care reviewed

## 2019-11-21 ENCOUNTER — OFFICE VISIT (OUTPATIENT)
Dept: URGENT CARE | Facility: PHYSICIAN GROUP | Age: 29
End: 2019-11-21
Payer: COMMERCIAL

## 2019-11-21 VITALS
TEMPERATURE: 97 F | DIASTOLIC BLOOD PRESSURE: 62 MMHG | BODY MASS INDEX: 22.96 KG/M2 | HEART RATE: 84 BPM | SYSTOLIC BLOOD PRESSURE: 112 MMHG | HEIGHT: 69 IN | OXYGEN SATURATION: 95 % | WEIGHT: 155 LBS | RESPIRATION RATE: 16 BRPM

## 2019-11-21 DIAGNOSIS — J06.9 UPPER RESPIRATORY TRACT INFECTION, UNSPECIFIED TYPE: Primary | ICD-10-CM

## 2019-11-21 PROCEDURE — 99214 OFFICE O/P EST MOD 30 MIN: CPT | Performed by: PHYSICIAN ASSISTANT

## 2019-11-21 RX ORDER — AMOXICILLIN AND CLAVULANATE POTASSIUM 875; 125 MG/1; MG/1
1 TABLET, FILM COATED ORAL 2 TIMES DAILY
Qty: 14 TAB | Refills: 0 | Status: SHIPPED | OUTPATIENT
Start: 2019-11-21 | End: 2019-11-28

## 2019-11-21 ASSESSMENT — ENCOUNTER SYMPTOMS
SHORTNESS OF BREATH: 0
FEVER: 0
COUGH: 1
CHILLS: 1
SORE THROAT: 1
NAUSEA: 0
VOMITING: 0
MYALGIAS: 0
ABDOMINAL PAIN: 0
DIARRHEA: 0
SPUTUM PRODUCTION: 0
CONSTIPATION: 0

## 2019-11-21 NOTE — PROGRESS NOTES
Subjective:   Karyn Dalton is a 29 y.o. female who presents for Cough (Cough, congestion, ear aches, congestion [id number] )        Cough   This is a new problem. Episode onset: 10 days. The problem has been unchanged. The cough is productive of sputum. Associated symptoms include chills, ear congestion, ear pain, nasal congestion and a sore throat. Pertinent negatives include no fever, myalgias or shortness of breath. Risk factors: Coworkers with similar sx. Pt received flu shot.  Treatments tried: Dayquil and nyquil  There is no history of asthma.     Review of Systems   Constitutional: Positive for chills. Negative for fever and malaise/fatigue.   HENT: Positive for congestion, ear pain and sore throat.    Respiratory: Positive for cough. Negative for sputum production and shortness of breath.    Gastrointestinal: Negative for abdominal pain, constipation, diarrhea, nausea and vomiting.   Musculoskeletal: Negative for myalgias.   All other systems reviewed and are negative.      PMH:  has a past medical history of Anemia, Anxiety, Asthma, Migraine, and Physiological ovarian cysts. She also has no past medical history of Psychosis (HCC), Seizure (HCC), Self-injurious behavior, Suicide attempt (HCC), or Type II or unspecified type diabetes mellitus without mention of complication, not stated as uncontrolled.  MEDS:   Current Outpatient Medications:   •  amoxicillin-clavulanate (AUGMENTIN) 875-125 MG Tab, Take 1 Tab by mouth 2 times a day for 7 days., Disp: 14 Tab, Rfl: 0  •  norethindrone-ethinyl estradiol-iron (MICROGESTIN FE1.5/30) 1.5-30 MG-MCG tablet, Take 1 Tab by mouth every day., Disp: 28 Tab, Rfl: 11  •  levonorgestrel (MIRENA) 20 MCG/24HR IUD, 1 Each by Intrauterine route Once., Disp: , Rfl:   ALLERGIES: No Known Allergies  SURGHX:   Past Surgical History:   Procedure Laterality Date   • OTHER      age 12 hyperhydrosis     SOCHX:  reports that she has never smoked. She has never used smokeless  "tobacco. She reports current alcohol use. She reports that she does not use drugs.  Family History   Problem Relation Age of Onset   • Heart Disease Paternal Uncle    • Heart Disease Maternal Grandmother    • Cancer Maternal Grandmother         breast   • Heart Disease Maternal Grandfather    • Alcohol abuse Maternal Grandfather    • Heart Disease Paternal Grandmother    • Cancer Paternal Grandmother         throat, tobacco use   • Heart Disease Paternal Grandfather    • Hypertension Paternal Grandfather    • Diabetes Paternal Grandfather    • Anxiety disorder Mother    • Alcohol abuse Maternal Aunt    • Suicide Attempts Neg Hx    • Schizophrenia Neg Hx    • Bipolar disorder Neg Hx         Objective:   /62   Pulse 84   Temp 36.1 °C (97 °F) (Temporal)   Resp 16   Ht 1.753 m (5' 9\")   Wt 70.3 kg (155 lb)   SpO2 95%   Breastfeeding? No   BMI 22.89 kg/m²     Physical Exam  Vitals signs reviewed.   Constitutional:       General: She is not in acute distress.     Appearance: She is well-developed.   HENT:      Head: Normocephalic and atraumatic.      Right Ear: Tympanic membrane and external ear normal.      Left Ear: External ear normal. Tympanic membrane is erythematous.      Nose: Nasal tenderness, mucosal edema and congestion present.      Mouth/Throat:      Mouth: Mucous membranes are moist.      Pharynx: Oropharynx is clear.      Tonsils: Swellin+ on the right. 1+ on the left.   Eyes:      Conjunctiva/sclera: Conjunctivae normal.      Pupils: Pupils are equal, round, and reactive to light.   Neck:      Musculoskeletal: Normal range of motion and neck supple.      Trachea: No tracheal deviation.   Cardiovascular:      Rate and Rhythm: Normal rate and regular rhythm.   Pulmonary:      Effort: Pulmonary effort is normal. No respiratory distress.      Breath sounds: Normal breath sounds. No wheezing or rales.   Skin:     General: Skin is warm and dry.      Capillary Refill: Capillary refill takes less " than 2 seconds.   Neurological:      Mental Status: She is alert and oriented to person, place, and time.   Psychiatric:         Behavior: Behavior normal.           Assessment/Plan:     1. Upper respiratory tract infection, unspecified type  amoxicillin-clavulanate (AUGMENTIN) 875-125 MG Tab     Supportive care reviewed. URI handout provided. Continue nyquil, dayquil.     Follow-up with primary care provider within 7-10 days.  If symptoms worsen or persist patient can return to clinic for reevaluation. All side effects of medication discussed including allergic response, GI upset, tendon injury, etc. Patient confirmed understanding of information.    Please note that this dictation was created using voice recognition software. I have made every reasonable attempt to correct obvious errors, but I expect that there are errors of grammar and possibly content that I did not discover before finalizing the note.

## 2019-11-21 NOTE — LETTER
November 21, 2019         Patient: Karyn Dalton   YOB: 1990   Date of Visit: 11/21/2019           To Whom it May Concern:    Karyn Dalton was seen in my clinic on 11/21/2019. She may return to work on 11/25/19.    If you have any questions or concerns, please don't hesitate to call.        Sincerely,           Danii Yeager P.A.-C.  Electronically Signed

## 2019-11-22 NOTE — PATIENT INSTRUCTIONS
"Upper Respiratory Infection, Adult  Most upper respiratory infections (URIs) are a viral infection of the air passages leading to the lungs. A URI affects the nose, throat, and upper air passages. The most common type of URI is nasopharyngitis and is typically referred to as \"the common cold.\"  URIs run their course and usually go away on their own. Most of the time, a URI does not require medical attention, but sometimes a bacterial infection in the upper airways can follow a viral infection. This is called a secondary infection. Sinus and middle ear infections are common types of secondary upper respiratory infections.  Bacterial pneumonia can also complicate a URI. A URI can worsen asthma and chronic obstructive pulmonary disease (COPD). Sometimes, these complications can require emergency medical care and may be life threatening.  What are the causes?  Almost all URIs are caused by viruses. A virus is a type of germ and can spread from one person to another.  What increases the risk?  You may be at risk for a URI if:  · You smoke.  · You have chronic heart or lung disease.  · You have a weakened defense (immune) system.  · You are very young or very old.  · You have nasal allergies or asthma.  · You work in crowded or poorly ventilated areas.  · You work in health care facilities or schools.  What are the signs or symptoms?  Symptoms typically develop 2-3 days after you come in contact with a cold virus. Most viral URIs last 7-10 days. However, viral URIs from the influenza virus (flu virus) can last 14-18 days and are typically more severe. Symptoms may include:  · Runny or stuffy (congested) nose.  · Sneezing.  · Cough.  · Sore throat.  · Headache.  · Fatigue.  · Fever.  · Loss of appetite.  · Pain in your forehead, behind your eyes, and over your cheekbones (sinus pain).  · Muscle aches.  How is this diagnosed?  Your health care provider may diagnose a URI by:  · Physical exam.  · Tests to check that your " symptoms are not due to another condition such as:  ¨ Strep throat.  ¨ Sinusitis.  ¨ Pneumonia.  ¨ Asthma.  How is this treated?  A URI goes away on its own with time. It cannot be cured with medicines, but medicines may be prescribed or recommended to relieve symptoms. Medicines may help:  · Reduce your fever.  · Reduce your cough.  · Relieve nasal congestion.  Follow these instructions at home:  · Take medicines only as directed by your health care provider.  · Gargle warm saltwater or take cough drops to comfort your throat as directed by your health care provider.  · Use a warm mist humidifier or inhale steam from a shower to increase air moisture. This may make it easier to breathe.  · Drink enough fluid to keep your urine clear or pale yellow.  · Eat soups and other clear broths and maintain good nutrition.  · Rest as needed.  · Return to work when your temperature has returned to normal or as your health care provider advises. You may need to stay home longer to avoid infecting others. You can also use a face mask and careful hand washing to prevent spread of the virus.  · Increase the usage of your inhaler if you have asthma.  · Do not use any tobacco products, including cigarettes, chewing tobacco, or electronic cigarettes. If you need help quitting, ask your health care provider.  How is this prevented?  The best way to protect yourself from getting a cold is to practice good hygiene.  · Avoid oral or hand contact with people with cold symptoms.  · Wash your hands often if contact occurs.  There is no clear evidence that vitamin C, vitamin E, echinacea, or exercise reduces the chance of developing a cold. However, it is always recommended to get plenty of rest, exercise, and practice good nutrition.  Contact a health care provider if:  · You are getting worse rather than better.  · Your symptoms are not controlled by medicine.  · You have chills.  · You have worsening shortness of breath.  · You have brown  or red mucus.  · You have yellow or brown nasal discharge.  · You have pain in your face, especially when you bend forward.  · You have a fever.  · You have swollen neck glands.  · You have pain while swallowing.  · You have white areas in the back of your throat.  Get help right away if:  · You have severe or persistent:  ¨ Headache.  ¨ Ear pain.  ¨ Sinus pain.  ¨ Chest pain.  · You have chronic lung disease and any of the following:  ¨ Wheezing.  ¨ Prolonged cough.  ¨ Coughing up blood.  ¨ A change in your usual mucus.  · You have a stiff neck.  · You have changes in your:  ¨ Vision.  ¨ Hearing.  ¨ Thinking.  ¨ Mood.  This information is not intended to replace advice given to you by your health care provider. Make sure you discuss any questions you have with your health care provider.  Document Released: 06/13/2002 Document Revised: 08/20/2017 Document Reviewed: 03/25/2015  [employer] Interactive Patient Education © 2017 [employer]

## 2020-02-19 ENCOUNTER — HOSPITAL ENCOUNTER (OUTPATIENT)
Dept: LAB | Facility: MEDICAL CENTER | Age: 30
End: 2020-02-19
Attending: INTERNAL MEDICINE
Payer: COMMERCIAL

## 2020-02-19 ENCOUNTER — OFFICE VISIT (OUTPATIENT)
Dept: MEDICAL GROUP | Facility: MEDICAL CENTER | Age: 30
End: 2020-02-19
Payer: COMMERCIAL

## 2020-02-19 VITALS
BODY MASS INDEX: 23.9 KG/M2 | HEIGHT: 69 IN | RESPIRATION RATE: 16 BRPM | SYSTOLIC BLOOD PRESSURE: 100 MMHG | DIASTOLIC BLOOD PRESSURE: 62 MMHG | TEMPERATURE: 98.3 F | WEIGHT: 161.38 LBS | OXYGEN SATURATION: 100 % | HEART RATE: 61 BPM

## 2020-02-19 DIAGNOSIS — F41.1 GAD (GENERALIZED ANXIETY DISORDER): ICD-10-CM

## 2020-02-19 DIAGNOSIS — Z00.00 PREVENTATIVE HEALTH CARE: ICD-10-CM

## 2020-02-19 DIAGNOSIS — Z23 NEED FOR VACCINATION: ICD-10-CM

## 2020-02-19 DIAGNOSIS — Z30.09 BIRTH CONTROL COUNSELING: ICD-10-CM

## 2020-02-19 LAB
BASOPHILS # BLD AUTO: 0.8 % (ref 0–1.8)
BASOPHILS # BLD: 0.05 K/UL (ref 0–0.12)
EOSINOPHIL # BLD AUTO: 0.21 K/UL (ref 0–0.51)
EOSINOPHIL NFR BLD: 3.5 % (ref 0–6.9)
ERYTHROCYTE [DISTWIDTH] IN BLOOD BY AUTOMATED COUNT: 45 FL (ref 35.9–50)
HCT VFR BLD AUTO: 40.1 % (ref 37–47)
HGB BLD-MCNC: 13.4 G/DL (ref 12–16)
IMM GRANULOCYTES # BLD AUTO: 0.01 K/UL (ref 0–0.11)
IMM GRANULOCYTES NFR BLD AUTO: 0.2 % (ref 0–0.9)
LYMPHOCYTES # BLD AUTO: 1.86 K/UL (ref 1–4.8)
LYMPHOCYTES NFR BLD: 31.4 % (ref 22–41)
MCH RBC QN AUTO: 32.2 PG (ref 27–33)
MCHC RBC AUTO-ENTMCNC: 33.4 G/DL (ref 33.6–35)
MCV RBC AUTO: 96.4 FL (ref 81.4–97.8)
MONOCYTES # BLD AUTO: 0.42 K/UL (ref 0–0.85)
MONOCYTES NFR BLD AUTO: 7.1 % (ref 0–13.4)
NEUTROPHILS # BLD AUTO: 3.38 K/UL (ref 2–7.15)
NEUTROPHILS NFR BLD: 57 % (ref 44–72)
NRBC # BLD AUTO: 0 K/UL
NRBC BLD-RTO: 0 /100 WBC
PLATELET # BLD AUTO: 321 K/UL (ref 164–446)
PMV BLD AUTO: 9.3 FL (ref 9–12.9)
RBC # BLD AUTO: 4.16 M/UL (ref 4.2–5.4)
TREPONEMA PALLIDUM IGG+IGM AB [PRESENCE] IN SERUM OR PLASMA BY IMMUNOASSAY: NON REACTIVE
WBC # BLD AUTO: 5.9 K/UL (ref 4.8–10.8)

## 2020-02-19 PROCEDURE — 99214 OFFICE O/P EST MOD 30 MIN: CPT | Mod: 25 | Performed by: INTERNAL MEDICINE

## 2020-02-19 PROCEDURE — 90471 IMMUNIZATION ADMIN: CPT | Performed by: INTERNAL MEDICINE

## 2020-02-19 PROCEDURE — 87389 HIV-1 AG W/HIV-1&-2 AB AG IA: CPT

## 2020-02-19 PROCEDURE — 86780 TREPONEMA PALLIDUM: CPT

## 2020-02-19 PROCEDURE — 85025 COMPLETE CBC W/AUTO DIFF WBC: CPT

## 2020-02-19 PROCEDURE — 87591 N.GONORRHOEAE DNA AMP PROB: CPT

## 2020-02-19 PROCEDURE — 36415 COLL VENOUS BLD VENIPUNCTURE: CPT

## 2020-02-19 PROCEDURE — 87529 HSV DNA AMP PROBE: CPT | Mod: 91

## 2020-02-19 PROCEDURE — 87491 CHLMYD TRACH DNA AMP PROBE: CPT

## 2020-02-19 PROCEDURE — 90746 HEPB VACCINE 3 DOSE ADULT IM: CPT | Performed by: INTERNAL MEDICINE

## 2020-02-19 RX ORDER — DROSPIRENONE AND ETHINYL ESTRADIOL 0.03MG-3MG
1 KIT ORAL DAILY
Qty: 28 TAB | Refills: 11 | Status: SHIPPED | OUTPATIENT
Start: 2020-02-19 | End: 2021-01-28

## 2020-02-19 RX ORDER — ESCITALOPRAM OXALATE 10 MG/1
10 TABLET ORAL DAILY
Qty: 30 TAB | Refills: 11 | Status: SHIPPED | OUTPATIENT
Start: 2020-02-19 | End: 2020-05-19

## 2020-02-19 ASSESSMENT — PATIENT HEALTH QUESTIONNAIRE - PHQ9
CLINICAL INTERPRETATION OF PHQ2 SCORE: 3
5. POOR APPETITE OR OVEREATING: 2 - MORE THAN HALF THE DAYS
SUM OF ALL RESPONSES TO PHQ QUESTIONS 1-9: 13

## 2020-02-19 NOTE — PROGRESS NOTES
CC:  Diagnoses of Need for vaccination, DAVID (generalized anxiety disorder), Birth control counseling, and Preventative health care were pertinent to this visit.    HISTORY OF THE PRESENT ILLNESS: Patient is a 29 y.o. female. This pleasant patient is here today to follow-up.    Unfortunately since her last visit together she has broken up with her fiancé, wedding was canceled, she had her IUD removed.  Also moved locations.  Has same job.  Understandably a lot of stressors.  She is not sure if the new birth control Microgestin is affecting her mood or the recent life stressors, though she does feel like she has constant PMS symptoms.  PHQ 9 score today 13 primarily trouble sleeping and related decrease in concentration, change in appetite, fatigue, etc.  She is using melatonin with benefit.  She is seeing her counselor over the past year.  Alcohol use is under control, no current binge drinking.  No SI or HI.  In the past she used to be on Lexapro and this was very helpful, she is not sure she wishes to start it now but she would like to have it on hand.  Does currently have some low-level generalized anxiety, not feeling like herself.    She is in a new relationship.  Wishes to have STD screening for preventative health.  Asymptomatic.  Had recent normal Pap smear through her gynecologist.    Labs reviewed from 2/5/2019 normal CBC, CMP, GFR.  Patient does request repeat CBC, with all her recent stressors she feels like she is borderline coming down with 1 of the circulating viral illnesses, patient advised current CBC is normal and that repeat is not absolutely necessary, though with her request we are happy to arrange this.    Allergies: Patient has no known allergies.    Current Outpatient Medications Ordered in Epic   Medication Sig Dispense Refill   • drospirenone-ethinyl estradiol (ARIANNE) 3-0.03 MG per tablet Take 1 Tab by mouth every day. 28 Tab 11   • escitalopram (LEXAPRO) 10 MG Tab Take 1 Tab by mouth  every day. 30 Tab 11     No current [facility]-ordered facility-administered medications on file.        Past Medical History:   Diagnosis Date   • Anemia    • Anxiety    • Asthma    • Migraine    • Physiological ovarian cysts        Past Surgical History:   Procedure Laterality Date   • OTHER      age 12 hyperhydrosis       Social History     Tobacco Use   • Smoking status: Never Smoker   • Smokeless tobacco: Never Used   Substance Use Topics   • Alcohol use: Yes     Comment: 2 drinks a few nights/week, history of heavy use.     • Drug use: No       Social History     Social History Narrative   • Not on file       Family History   Problem Relation Age of Onset   • Heart Disease Paternal Uncle    • Heart Disease Maternal Grandmother    • Cancer Maternal Grandmother         breast   • Heart Disease Maternal Grandfather    • Alcohol abuse Maternal Grandfather    • Heart Disease Paternal Grandmother    • Cancer Paternal Grandmother         throat, tobacco use   • Heart Disease Paternal Grandfather    • Hypertension Paternal Grandfather    • Diabetes Paternal Grandfather    • Anxiety disorder Mother    • Alcohol abuse Maternal Aunt    • Suicide Attempts Neg Hx    • Schizophrenia Neg Hx    • Bipolar disorder Neg Hx        ROS:     - Constitutional: Negative for fever, chills, unexpected weight change, night sweats    - Eyes:   Negative for blurry vision, eye pain, discharge    - ENT:  Negative for hearing changes, ear pain, ear discharge, rhinorrhea, sinus congestion, sore throat     - Respiratory: Negative for cough, sputum production, chest congestion, dyspnea, wheezing, and crackles.      - Cardiovascular: Negative for chest pain, palpitations, orthopnea, and bilateral lower extremity edema.     - Gastrointestinal: Negative for heartburn, nausea, vomiting, abdominal pain, hematochezia, melena, diarrhea, constipation, and greasy/foul-smelling stools.     - Genitourinary: Negative for dysuria, polyuria, hematuria, pyuria,  "urinary urgency, and urinary incontinence.     - Musculoskeletal: Negative for myalgias, back pain, and joint pain.     - Skin: Negative for rash, itching, cyanotic skin color change.     - Neurological: Negative for migraines, numbness, ataxia, tremors, vertigo    - Endo:Negative for polyuria, heat/cold intolerance, excessive thirst    - Hem/lymphatic: Negative for easy bruising, blood clots, lymphedema, swollen glands    -Allergic/immun: Negative for allergic rhinitis    - Psychiatric/Behavioral: Negative for  suicidal/homicidal ideation and memory loss.      Exam: /62 (BP Location: Right arm, Patient Position: Sitting, BP Cuff Size: Adult)   Pulse 61   Temp 36.8 °C (98.3 °F) (Temporal)   Resp 16   Ht 1.753 m (5' 9\")   Wt 73.2 kg (161 lb 6 oz)   SpO2 100%  Body mass index is 23.83 kg/m².    General: Normal appearing. No distress.  EYES: Conjunctiva clear lids without ptosis, pupils equal  EARS: Normal shape and contour   Neurologic: Cranial nerves grossly nonfocal  Skin: Warm and dry.  No obvious lesions.  Musculoskeletal: Normal gait. No extremity cyanosis, clubbing, or edema.  Psych: Normal mood and affect. Alert and oriented x3. Judgment and insight is normal.      Assessment/Plan  1. Need for vaccination  - Hepatitis B Vaccine Adult IM    2. DAVID (generalized anxiety disorder)  Stable, chronic not optimally controlled, plan to restart Lexapro if other conservative measures at home are not optimally effective.  Patient may email me if after 2 weeks on Lexapro 10 mg, she needs to go up to 20 mg dose.  - escitalopram (LEXAPRO) 10 MG Tab; Take 1 Tab by mouth every day.  Dispense: 30 Tab; Refill: 11    3. Birth control counseling  Change Microgestin to Idania, patient will email me if no improvement in mood with the treatment measures today.  - drospirenone-ethinyl estradiol (IDANIA) 3-0.03 MG per tablet; Take 1 Tab by mouth every day.  Dispense: 28 Tab; Refill: 11    4. Preventative health care  - " Chlamydia/GC PCR Urine Or Swab; Future  - RPR  - HIV AG/AB COMBO ASSAY SCREENING; Future  - HSV 1/2 By PCR(Herpes)+[id number]; Future  -Patient request repeat CBC      Return to clinic 3 months if needed      Please note that this dictation was created using voice recognition software. I have made every reasonable attempt to correct obvious errors, but I expect that there are errors of grammar and possibly content that I did not discover before finalizing the note.

## 2020-02-20 LAB
C TRACH DNA SPEC QL NAA+PROBE: NEGATIVE
HIV 1+2 AB+HIV1 P24 AG SERPL QL IA: NON REACTIVE
N GONORRHOEA DNA SPEC QL NAA+PROBE: NEGATIVE
SPECIMEN SOURCE: NORMAL

## 2020-02-22 ENCOUNTER — PATIENT MESSAGE (OUTPATIENT)
Dept: MEDICAL GROUP | Facility: MEDICAL CENTER | Age: 30
End: 2020-02-22

## 2020-02-22 DIAGNOSIS — Z00.00 PREVENTATIVE HEALTH CARE: ICD-10-CM

## 2020-02-24 NOTE — TELEPHONE ENCOUNTER
From: Karyn Dalton  To: Renetta Kc M.D.  Sent: 2/22/2020 10:28 AM PST  Subject: Non-Urgent Medical Question    Hi, Dr. Kc. I’m sorry to bother you, but may I please request metabolic blood work panel tests as well? Specifically to look at my liver enzymes again.. thank you :)

## 2020-03-02 ENCOUNTER — TELEPHONE (OUTPATIENT)
Dept: OBGYN | Facility: CLINIC | Age: 30
End: 2020-03-02

## 2020-03-02 NOTE — TELEPHONE ENCOUNTER
Patient called stating that she was having her ovarian cyst pain again, but she is out of town and didn't want to have to go to the ER. I consulted with the doctor in office and he recommend that if her symptoms are severe then he recommends that she go to the ER, but if the symptoms are not severe then he recommend's that she try ibuprofen or tylenol.

## 2020-03-06 ENCOUNTER — TELEPHONE (OUTPATIENT)
Dept: OBGYN | Facility: CLINIC | Age: 30
End: 2020-03-06

## 2020-03-06 NOTE — TELEPHONE ENCOUNTER
Pt called to schedule an appt to discuss 6 cm cyst found while she had an episode in [place], was told that she needs surgery.     Scheduled and advised to bring records.    Verbalized understanding

## 2020-05-11 ENCOUNTER — APPOINTMENT (OUTPATIENT)
Dept: MEDICAL GROUP | Facility: MEDICAL CENTER | Age: 30
End: 2020-05-11
Payer: COMMERCIAL

## 2020-05-19 ENCOUNTER — OFFICE VISIT (OUTPATIENT)
Dept: MEDICAL GROUP | Facility: MEDICAL CENTER | Age: 30
End: 2020-05-19
Payer: COMMERCIAL

## 2020-05-19 VITALS
HEART RATE: 72 BPM | WEIGHT: 161.2 LBS | TEMPERATURE: 98.3 F | SYSTOLIC BLOOD PRESSURE: 114 MMHG | HEIGHT: 69 IN | DIASTOLIC BLOOD PRESSURE: 62 MMHG | BODY MASS INDEX: 23.88 KG/M2 | OXYGEN SATURATION: 99 % | RESPIRATION RATE: 16 BRPM

## 2020-05-19 DIAGNOSIS — F41.1 GENERALIZED ANXIETY DISORDER: ICD-10-CM

## 2020-05-19 DIAGNOSIS — F10.10 ALCOHOL ABUSE: ICD-10-CM

## 2020-05-19 DIAGNOSIS — Z00.00 PREVENTATIVE HEALTH CARE: ICD-10-CM

## 2020-05-19 DIAGNOSIS — M54.2 NECK PAIN: ICD-10-CM

## 2020-05-19 PROCEDURE — 99214 OFFICE O/P EST MOD 30 MIN: CPT | Performed by: INTERNAL MEDICINE

## 2020-05-19 ASSESSMENT — FIBROSIS 4 INDEX: FIB4 SCORE: 0.48

## 2020-05-20 NOTE — PROGRESS NOTES
CC:  Diagnoses of Preventative health care, Neck pain, DAVID (generalized anxiety disorder), and Alcohol abuse were pertinent to this visit.    HISTORY OF THE PRESENT ILLNESS: Patient is a 29 y.o. female. This pleasant patient is here today to follow-up.    Since last appointment she says that her mood is doing much better, and that this has been a really good week for her.  Since she was taking her Lexapro intermittently and was not feeling that it was helping her mood very much she decided to wean off.  She denies any SI HI, no uncontrolled anxiety /depression.  Still sees her counselor regularly.  Initially with stressors from the pandemic she was drinking more but has since cut back and most days has no alcohol.  She knows with her history of alcohol abuse probably best not to consume any alcohol.    Has intermittent neck stiffness for the past 3 years approximately.  No focal arm weakness or paresthesias.  She is interested in physical therapy and even treatment such as dry needling or acupuncture, etc.    Migraines are stable and unchanged.    Allergies: Patient has no known allergies.    Current Outpatient Medications Ordered in Epic   Medication Sig Dispense Refill   • drospirenone-ethinyl estradiol (ARIANNE) 3-0.03 MG per tablet Take 1 Tab by mouth every day. 28 Tab 11     No current [facility]-ordered facility-administered medications on file.        Past Medical History:   Diagnosis Date   • Anemia    • Anxiety    • Asthma    • Migraine    • Physiological ovarian cysts        Past Surgical History:   Procedure Laterality Date   • OTHER      age 12 hyperhydrosis       Social History     Tobacco Use   • Smoking status: Never Smoker   • Smokeless tobacco: Never Used   Substance Use Topics   • Alcohol use: Yes     Comment: 2 drinks a few nights/week, history of heavy use.     • Drug use: No       Social History     Social History Narrative   • Not on file       Family History   Problem Relation Age of Onset   • Heart  "Disease Paternal Uncle    • Heart Disease Maternal Grandmother    • Cancer Maternal Grandmother         breast   • Heart Disease Maternal Grandfather    • Alcohol abuse Maternal Grandfather    • Heart Disease Paternal Grandmother    • Cancer Paternal Grandmother         throat, tobacco use   • Heart Disease Paternal Grandfather    • Hypertension Paternal Grandfather    • Diabetes Paternal Grandfather    • Anxiety disorder Mother    • Alcohol abuse Maternal Aunt    • Suicide Attempts Neg Hx    • Schizophrenia Neg Hx    • Bipolar disorder Neg Hx        ROS:     - Constitutional: Negative for fever, chills    - Eyes:   Negative foreye pain, discharge    - ENT:  Negative for sore throat     - Respiratory: Negative for cough, sputum production, chest congestion, dyspnea, wheezing, and crackles.      - Cardiovascular: Negative for chest pain, palpitations, orthopnea, and bilateral lower extremity edema.     - Gastrointestinal: Negative for heartburn, nausea, vomiting, abdominal pain, hematochezia, melena, diarrhea, constipation, and greasy/foul-smelling stools.     - Genitourinary: Negative for dysuria    - Musculoskeletal:  See hpi    - Skin: Negative for rash, itching, cyanotic skin color change.     - Neurological: Negative for vertigo    - Endo:Negative for polyuria, heat/cold intolerance, excessive thirst    - Hem/lymphatic: Negative for easy bruising, blood clots, lymphedema, swollen glands    -Allergic/immun: Negative for allergic rhinitis    - Psychiatric/Behavioral: Negative for depression, suicidal/homicidal ideation and memory loss.      Exam: /62 (BP Location: Left arm, Patient Position: Sitting, BP Cuff Size: Adult)   Pulse 72   Temp 36.8 °C (98.3 °F) (Temporal)   Resp 16   Ht 1.753 m (5' 9\")   Wt 73.1 kg (161 lb 3.2 oz)   SpO2 99%  Body mass index is 23.81 kg/m².    General: Normal appearing. No distress.  EYES: Conjunctiva clear lids without ptosis, pupils equal  EARS: Normal shape and contour "   Pulmonary: Clear to ausculation.  Normal effort. No rales or wheezing.  Cardiovascular: Regular rate and rhythm without significant murmur.   Abdomen: Soft, nontender, nondistended. Normal bowel sounds.  Neurologic: Cranial nerves grossly nonfocal  Skin: Warm and dry.  No obvious lesions.  Musculoskeletal: Normal gait. No extremity cyanosis, clubbing, or edema.  Psych: Normal mood and affect. Alert and oriented x3. Judgment and insight is normal.      Assessment/Plan    1. Preventative health care  - Comp Metabolic Panel; Future  - Lipid Profile; Future    2. Neck pain  New issue. Suspect stressors and muscle imbalance may be creating musculoskeletal strain and she would benefit from physical therapy.  - REFERRAL TO PHYSICAL THERAPY Reason for Therapy: Eval/Treat/Report    3. DAVID (generalized anxiety disorder)  She reports her symptoms are reasonably stable and controlled with regular counseling sessions.  She does not feel that she needs to be on SSRI or any medications for mood and this is reasonable.  Patient knows she can reach out to me at any time through chart email we can restart medication if needed.    4. Alcohol abuse  Advised likely she should have no alcohol since alcohol can trigger receptors in the brain with her history of alcohol abuse.  She has done a really good job recognizing her stressors and now indicates that at most she drinks once or twice per week, she plans to cut back even further and quit as well as continue discussing with her counselor.      rtc 1 yr per pt request or sooner prn or communication anytime chart email          Please note that this dictation was created using voice recognition software. I have made every reasonable attempt to correct obvious errors, but I expect that there are errors of grammar and possibly content that I did not discover before finalizing the note.

## 2020-05-22 ENCOUNTER — HOSPITAL ENCOUNTER (OUTPATIENT)
Dept: LAB | Facility: MEDICAL CENTER | Age: 30
End: 2020-05-22
Attending: INTERNAL MEDICINE
Payer: COMMERCIAL

## 2020-05-22 DIAGNOSIS — Z00.00 PREVENTATIVE HEALTH CARE: ICD-10-CM

## 2020-05-22 LAB
ALBUMIN SERPL BCP-MCNC: 4.4 G/DL (ref 3.2–4.9)
ALBUMIN/GLOB SERPL: 1.6 G/DL
ALP SERPL-CCNC: 53 U/L (ref 30–99)
ALT SERPL-CCNC: 14 U/L (ref 2–50)
ANION GAP SERPL CALC-SCNC: 11 MMOL/L (ref 7–16)
AST SERPL-CCNC: 21 U/L (ref 12–45)
BILIRUB SERPL-MCNC: 0.3 MG/DL (ref 0.1–1.5)
BUN SERPL-MCNC: 13 MG/DL (ref 8–22)
CALCIUM SERPL-MCNC: 9.5 MG/DL (ref 8.5–10.5)
CHLORIDE SERPL-SCNC: 103 MMOL/L (ref 96–112)
CHOLEST SERPL-MCNC: 173 MG/DL (ref 100–199)
CO2 SERPL-SCNC: 22 MMOL/L (ref 20–33)
CREAT SERPL-MCNC: 0.68 MG/DL (ref 0.5–1.4)
FASTING STATUS PATIENT QL REPORTED: NORMAL
GLOBULIN SER CALC-MCNC: 2.8 G/DL (ref 1.9–3.5)
GLUCOSE SERPL-MCNC: 82 MG/DL (ref 65–99)
HDLC SERPL-MCNC: 63 MG/DL
LDLC SERPL CALC-MCNC: 96 MG/DL
POTASSIUM SERPL-SCNC: 4.7 MMOL/L (ref 3.6–5.5)
PROT SERPL-MCNC: 7.2 G/DL (ref 6–8.2)
SODIUM SERPL-SCNC: 136 MMOL/L (ref 135–145)
TRIGL SERPL-MCNC: 71 MG/DL (ref 0–149)

## 2020-05-22 PROCEDURE — 80053 COMPREHEN METABOLIC PANEL: CPT

## 2020-05-22 PROCEDURE — 80061 LIPID PANEL: CPT

## 2020-05-22 PROCEDURE — 36415 COLL VENOUS BLD VENIPUNCTURE: CPT

## 2020-05-28 ENCOUNTER — OFFICE VISIT (OUTPATIENT)
Dept: URGENT CARE | Facility: PHYSICIAN GROUP | Age: 30
End: 2020-05-28
Payer: COMMERCIAL

## 2020-05-28 VITALS
BODY MASS INDEX: 24.14 KG/M2 | RESPIRATION RATE: 16 BRPM | WEIGHT: 163 LBS | HEART RATE: 68 BPM | OXYGEN SATURATION: 99 % | SYSTOLIC BLOOD PRESSURE: 122 MMHG | HEIGHT: 69 IN | DIASTOLIC BLOOD PRESSURE: 80 MMHG | TEMPERATURE: 98.6 F

## 2020-05-28 DIAGNOSIS — T30.0 BURN: ICD-10-CM

## 2020-05-28 PROCEDURE — 99213 OFFICE O/P EST LOW 20 MIN: CPT | Performed by: NURSE PRACTITIONER

## 2020-05-28 ASSESSMENT — ENCOUNTER SYMPTOMS
BRUISES/BLEEDS EASILY: 0
SHORTNESS OF BREATH: 0
COUGH: 0
WHEEZING: 0
BACK PAIN: 0

## 2020-05-28 ASSESSMENT — LIFESTYLE VARIABLES: SUBSTANCE_ABUSE: 0

## 2020-05-28 ASSESSMENT — FIBROSIS 4 INDEX: FIB4 SCORE: 0.54
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
## 2020-10-28 ENCOUNTER — HOSPITAL ENCOUNTER (OUTPATIENT)
Dept: LAB | Facility: MEDICAL CENTER | Age: 30
End: 2020-10-28
Payer: COMMERCIAL

## 2020-10-28 LAB — COVID ORDER STATUS COVID19: NORMAL

## 2020-10-29 LAB
SARS-COV-2 RNA RESP QL NAA+PROBE: NOTDETECTED
SPECIMEN SOURCE: NORMAL

## 2020-11-02 ENCOUNTER — OFFICE VISIT (OUTPATIENT)
Dept: MEDICAL GROUP | Facility: MEDICAL CENTER | Age: 30
End: 2020-11-02
Payer: COMMERCIAL

## 2020-11-02 VITALS
SYSTOLIC BLOOD PRESSURE: 108 MMHG | DIASTOLIC BLOOD PRESSURE: 68 MMHG | OXYGEN SATURATION: 98 % | HEIGHT: 69 IN | TEMPERATURE: 97.7 F | RESPIRATION RATE: 16 BRPM | WEIGHT: 167.2 LBS | BODY MASS INDEX: 24.76 KG/M2 | HEART RATE: 75 BPM

## 2020-11-02 DIAGNOSIS — K59.09 OTHER CONSTIPATION: ICD-10-CM

## 2020-11-02 DIAGNOSIS — Z87.19 HISTORY OF HEMORRHOIDS: ICD-10-CM

## 2020-11-02 DIAGNOSIS — K58.2 IRRITABLE BOWEL SYNDROME WITH BOTH CONSTIPATION AND DIARRHEA: ICD-10-CM

## 2020-11-02 DIAGNOSIS — K62.5 RECTAL BLEEDING: ICD-10-CM

## 2020-11-02 DIAGNOSIS — Z23 NEED FOR VACCINATION: ICD-10-CM

## 2020-11-02 PROCEDURE — 90686 IIV4 VACC NO PRSV 0.5 ML IM: CPT | Performed by: INTERNAL MEDICINE

## 2020-11-02 PROCEDURE — 90471 IMMUNIZATION ADMIN: CPT | Performed by: INTERNAL MEDICINE

## 2020-11-02 PROCEDURE — 99214 OFFICE O/P EST MOD 30 MIN: CPT | Mod: 25 | Performed by: INTERNAL MEDICINE

## 2020-11-02 RX ORDER — HYDROCORTISONE ACETATE 25 MG/1
25 SUPPOSITORY RECTAL EVERY 12 HOURS
Qty: 30 SUPPOSITORY | Refills: 0 | Status: SHIPPED | OUTPATIENT
Start: 2020-11-02 | End: 2021-04-05

## 2020-11-02 RX ORDER — FLIBANSERIN 100 MG/1
100 TABLET, FILM COATED ORAL DAILY
COMMUNITY
Start: 2020-10-08 | End: 2021-04-05

## 2020-11-02 ASSESSMENT — FIBROSIS 4 INDEX: FIB4 SCORE: 0.56
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
## 2020-12-03 ENCOUNTER — HOSPITAL ENCOUNTER (OUTPATIENT)
Dept: LAB | Facility: MEDICAL CENTER | Age: 30
End: 2020-12-03
Payer: COMMERCIAL

## 2020-12-03 LAB — COVID ORDER STATUS COVID19: NORMAL

## 2020-12-04 LAB
SARS-COV-2 RNA RESP QL NAA+PROBE: NOTDETECTED
SPECIMEN SOURCE: NORMAL

## 2021-01-27 ENCOUNTER — HOSPITAL ENCOUNTER (OUTPATIENT)
Dept: LAB | Facility: MEDICAL CENTER | Age: 31
End: 2021-01-27
Payer: COMMERCIAL

## 2021-01-27 LAB
COVID ORDER STATUS COVID19: NORMAL
SARS-COV-2 RNA RESP QL NAA+PROBE: NOTDETECTED
SPECIMEN SOURCE: NORMAL

## 2021-01-28 DIAGNOSIS — Z30.09 BIRTH CONTROL COUNSELING: ICD-10-CM

## 2021-01-28 RX ORDER — DROSPIRENONE AND ETHINYL ESTRADIOL 0.03MG-3MG
KIT ORAL
Qty: 84 TAB | Refills: 3 | Status: SHIPPED | OUTPATIENT
Start: 2021-01-28 | End: 2021-10-07

## 2021-01-29 ENCOUNTER — TELEPHONE (OUTPATIENT)
Dept: MEDICAL GROUP | Facility: MEDICAL CENTER | Age: 31
End: 2021-01-29

## 2021-01-29 NOTE — TELEPHONE ENCOUNTER
ESTABLISHED PATIENT PRE-VISIT PLANNING     Patient was NOT contacted to complete PVP.     Note: Patient will not be contacted if there is no indication to call.     1.  Reviewed notes from the last few office visits within the medical group: Yes    2.  If any orders were placed at last visit or intended to be done for this visit (i.e. 6 mos follow-up), do we have Results/Consult Notes?         •  Labs - Labs were not ordered at last office visit.  Note: If patient appointment is for lab review and patient did not complete labs, check with provider if OK to reschedule patient until labs completed.       •  Imaging - Imaging was not ordered at last office visit.       •  Referrals - Referral ordered, patient has NOT been seen.    3. Is this appointment scheduled as a Hospital Follow-Up? No    4.  Immunizations were updated in Epic using Reconcile Outside Information activity? Yes    5.  Patient is due for the following Health Maintenance Topics:   Health Maintenance Due   Topic Date Due   • IMM HEP B VACCINE (3 of 3 - Risk 3-dose series) 06/19/2020     6.  AHA (Pulse8) form printed for Provider? N/A

## 2021-01-29 NOTE — TELEPHONE ENCOUNTER
Received request via: Pharmacy    Was the patient seen in the last year in this department? Yes    Does the patient have an active prescription (recently filled or refills available) for medication(s) requested? No     Requested Prescriptions     Pending Prescriptions Disp Refills   • drospirenone-ethinyl estradiol (ARIANNE) 3-0.03 MG per tablet [Pharmacy Med Name: DROSPIRENONE-EE 3-0.03 MG TAB] 84 Tab 3     Sig: TAKE 1 TABLET BY MOUTH EVERY DAY

## 2021-02-02 ENCOUNTER — APPOINTMENT (OUTPATIENT)
Dept: MEDICAL GROUP | Facility: MEDICAL CENTER | Age: 31
End: 2021-02-02
Payer: COMMERCIAL

## 2021-03-24 ENCOUNTER — TELEPHONE (OUTPATIENT)
Dept: MEDICAL GROUP | Facility: MEDICAL CENTER | Age: 31
End: 2021-03-24

## 2021-03-24 NOTE — TELEPHONE ENCOUNTER
ESTABLISHED PATIENT PRE-VISIT PLANNING     Patient was NOT contacted to complete PVP.     Note: Patient will not be contacted if there is no indication to call.     1.  Reviewed notes from the last few office visits within the medical group: Yes    2.  If any orders were placed at last visit or intended to be done for this visit (i.e. 6 mos follow-up), do we have Results/Consult Notes?         •  Labs - Labs were not ordered at last office visit.  Note: If patient appointment is for lab review and patient did not complete labs, check with provider if OK to reschedule patient until labs completed.       •  Imaging - Imaging was not ordered at last office visit.       •  Referrals - Referral ordered, patient has NOT been seen.    3. Is this appointment scheduled as a Hospital Follow-Up? No    4.  Immunizations were updated in Epic using Reconcile Outside Information activity? Yes    5.  Patient is due for the following Health Maintenance Topics:   Health Maintenance Due   Topic Date Due   • IMM HEP B VACCINE (3 of 3 - Risk 3-dose series) 06/19/2020

## 2021-04-01 ENCOUNTER — APPOINTMENT (OUTPATIENT)
Dept: MEDICAL GROUP | Facility: MEDICAL CENTER | Age: 31
End: 2021-04-01
Payer: COMMERCIAL

## 2021-04-05 ENCOUNTER — HOSPITAL ENCOUNTER (OUTPATIENT)
Dept: LAB | Facility: MEDICAL CENTER | Age: 31
End: 2021-04-05
Attending: INTERNAL MEDICINE
Payer: COMMERCIAL

## 2021-04-05 ENCOUNTER — OFFICE VISIT (OUTPATIENT)
Dept: MEDICAL GROUP | Facility: MEDICAL CENTER | Age: 31
End: 2021-04-05
Payer: COMMERCIAL

## 2021-04-05 VITALS
HEIGHT: 69 IN | WEIGHT: 166.89 LBS | BODY MASS INDEX: 24.72 KG/M2 | RESPIRATION RATE: 18 BRPM | HEART RATE: 65 BPM | TEMPERATURE: 97.4 F | SYSTOLIC BLOOD PRESSURE: 112 MMHG | OXYGEN SATURATION: 97 % | DIASTOLIC BLOOD PRESSURE: 78 MMHG

## 2021-04-05 DIAGNOSIS — J45.20 MILD INTERMITTENT ASTHMA WITHOUT COMPLICATION: ICD-10-CM

## 2021-04-05 DIAGNOSIS — Z00.00 PREVENTATIVE HEALTH CARE: ICD-10-CM

## 2021-04-05 DIAGNOSIS — Z77.018: ICD-10-CM

## 2021-04-05 PROBLEM — G43.009 MIGRAINE WITHOUT AURA AND WITHOUT STATUS MIGRAINOSUS, NOT INTRACTABLE: Status: RESOLVED | Noted: 2019-02-05 | Resolved: 2021-04-05

## 2021-04-05 PROBLEM — Z01.419 WELL WOMAN EXAM WITH ROUTINE GYNECOLOGICAL EXAM: Status: RESOLVED | Noted: 2019-01-10 | Resolved: 2021-04-05

## 2021-04-05 LAB
ALBUMIN SERPL BCP-MCNC: 4.5 G/DL (ref 3.2–4.9)
ALBUMIN/GLOB SERPL: 1.6 G/DL
ALP SERPL-CCNC: 57 U/L (ref 30–99)
ALT SERPL-CCNC: 10 U/L (ref 2–50)
ANION GAP SERPL CALC-SCNC: 9 MMOL/L (ref 7–16)
AST SERPL-CCNC: 15 U/L (ref 12–45)
BILIRUB SERPL-MCNC: 0.3 MG/DL (ref 0.1–1.5)
BUN SERPL-MCNC: 11 MG/DL (ref 8–22)
CALCIUM SERPL-MCNC: 9.3 MG/DL (ref 8.4–10.2)
CHLORIDE SERPL-SCNC: 103 MMOL/L (ref 96–112)
CHOLEST SERPL-MCNC: 179 MG/DL (ref 100–199)
CO2 SERPL-SCNC: 23 MMOL/L (ref 20–33)
CREAT SERPL-MCNC: 0.66 MG/DL (ref 0.5–1.4)
ERYTHROCYTE [DISTWIDTH] IN BLOOD BY AUTOMATED COUNT: 43.9 FL (ref 35.9–50)
FASTING STATUS PATIENT QL REPORTED: NORMAL
GLOBULIN SER CALC-MCNC: 2.8 G/DL (ref 1.9–3.5)
GLUCOSE SERPL-MCNC: 92 MG/DL (ref 65–99)
HCT VFR BLD AUTO: 40.9 % (ref 37–47)
HDLC SERPL-MCNC: 63 MG/DL
HGB BLD-MCNC: 13.6 G/DL (ref 12–16)
LDLC SERPL CALC-MCNC: 92 MG/DL
MCH RBC QN AUTO: 30.7 PG (ref 27–33)
MCHC RBC AUTO-ENTMCNC: 33.3 G/DL (ref 33.6–35)
MCV RBC AUTO: 92.3 FL (ref 81.4–97.8)
PLATELET # BLD AUTO: 369 K/UL (ref 164–446)
PMV BLD AUTO: 9.3 FL (ref 9–12.9)
POTASSIUM SERPL-SCNC: 4.3 MMOL/L (ref 3.6–5.5)
PROT SERPL-MCNC: 7.3 G/DL (ref 6–8.2)
RBC # BLD AUTO: 4.43 M/UL (ref 4.2–5.4)
SODIUM SERPL-SCNC: 135 MMOL/L (ref 135–145)
TRIGL SERPL-MCNC: 120 MG/DL (ref 0–149)
WBC # BLD AUTO: 7.8 K/UL (ref 4.8–10.8)

## 2021-04-05 PROCEDURE — 99395 PREV VISIT EST AGE 18-39: CPT | Performed by: INTERNAL MEDICINE

## 2021-04-05 PROCEDURE — 82306 VITAMIN D 25 HYDROXY: CPT

## 2021-04-05 PROCEDURE — 85027 COMPLETE CBC AUTOMATED: CPT

## 2021-04-05 PROCEDURE — 80061 LIPID PANEL: CPT

## 2021-04-05 PROCEDURE — 83885 ASSAY OF NICKEL: CPT

## 2021-04-05 PROCEDURE — 36415 COLL VENOUS BLD VENIPUNCTURE: CPT

## 2021-04-05 PROCEDURE — 80053 COMPREHEN METABOLIC PANEL: CPT

## 2021-04-05 RX ORDER — ALBUTEROL SULFATE 90 UG/1
2 AEROSOL, METERED RESPIRATORY (INHALATION) EVERY 4 HOURS PRN
Qty: 1 EACH | Refills: 6 | Status: SHIPPED | OUTPATIENT
Start: 2021-04-05 | End: 2024-03-10

## 2021-04-05 SDOH — ECONOMIC STABILITY: INCOME INSECURITY: IN THE LAST 12 MONTHS, WAS THERE A TIME WHEN YOU WERE NOT ABLE TO PAY THE MORTGAGE OR RENT ON TIME?: NO

## 2021-04-05 SDOH — ECONOMIC STABILITY: TRANSPORTATION INSECURITY
IN THE PAST 12 MONTHS, HAS LACK OF RELIABLE TRANSPORTATION KEPT YOU FROM MEDICAL APPOINTMENTS, MEETINGS, WORK OR FROM GETTING THINGS NEEDED FOR DAILY LIVING?: NO

## 2021-04-05 SDOH — ECONOMIC STABILITY: HOUSING INSECURITY
IN THE LAST 12 MONTHS, WAS THERE A TIME WHEN YOU DID NOT HAVE A STEADY PLACE TO SLEEP OR SLEPT IN A SHELTER (INCLUDING NOW)?: NO

## 2021-04-05 SDOH — HEALTH STABILITY: PHYSICAL HEALTH: ON AVERAGE, HOW MANY DAYS PER WEEK DO YOU ENGAGE IN MODERATE TO STRENUOUS EXERCISE (LIKE A BRISK WALK)?: 2 DAYS

## 2021-04-05 SDOH — HEALTH STABILITY: MENTAL HEALTH
STRESS IS WHEN SOMEONE FEELS TENSE, NERVOUS, ANXIOUS, OR CAN'T SLEEP AT NIGHT BECAUSE THEIR MIND IS TROUBLED. HOW STRESSED ARE YOU?: RATHER MUCH

## 2021-04-05 SDOH — ECONOMIC STABILITY: TRANSPORTATION INSECURITY
IN THE PAST 12 MONTHS, HAS THE LACK OF TRANSPORTATION KEPT YOU FROM MEDICAL APPOINTMENTS OR FROM GETTING MEDICATIONS?: NO

## 2021-04-05 SDOH — ECONOMIC STABILITY: HOUSING INSECURITY: IN THE LAST 12 MONTHS, HOW MANY PLACES HAVE YOU LIVED?: 2

## 2021-04-05 SDOH — HEALTH STABILITY: PHYSICAL HEALTH: ON AVERAGE, HOW MANY MINUTES DO YOU ENGAGE IN EXERCISE AT THIS LEVEL?: 30 MINUTES

## 2021-04-05 ASSESSMENT — PATIENT HEALTH QUESTIONNAIRE - PHQ9: CLINICAL INTERPRETATION OF PHQ2 SCORE: 0

## 2021-04-05 ASSESSMENT — SOCIAL DETERMINANTS OF HEALTH (SDOH)
HOW OFTEN DO YOU HAVE A DRINK CONTAINING ALCOHOL: 4 OR MORE TIMES A WEEK
HOW HARD IS IT FOR YOU TO PAY FOR THE VERY BASICS LIKE FOOD, HOUSING, MEDICAL CARE, AND HEATING?: NOT HARD AT ALL
HOW OFTEN DO YOU ATTEND CHURCH OR RELIGIOUS SERVICES?: NEVER
HOW OFTEN DO YOU HAVE SIX OR MORE DRINKS ON ONE OCCASION: WEEKLY
HOW OFTEN DO YOU ATTENT MEETINGS OF THE CLUB OR ORGANIZATION YOU BELONG TO?: NEVER
ARE YOU MARRIED, WIDOWED, DIVORCED, SEPARATED, NEVER MARRIED, OR LIVING WITH A PARTNER?: LIVING WITH PARTNER
IN A TYPICAL WEEK, HOW MANY TIMES DO YOU TALK ON THE PHONE WITH FAMILY, FRIENDS, OR NEIGHBORS?: MORE THAN THREE TIMES A WEEK
DO YOU BELONG TO ANY CLUBS OR ORGANIZATIONS SUCH AS CHURCH GROUPS UNIONS, FRATERNAL OR ATHLETIC GROUPS, OR SCHOOL GROUPS?: NO
WITHIN THE PAST 12 MONTHS, THE FOOD YOU BOUGHT JUST DIDN'T LAST AND YOU DIDN'T HAVE MONEY TO GET MORE: NEVER TRUE
HOW MANY DRINKS CONTAINING ALCOHOL DO YOU HAVE ON A TYPICAL DAY WHEN YOU ARE DRINKING: 1 OR 2
WITHIN THE PAST 12 MONTHS, YOU WORRIED THAT YOUR FOOD WOULD RUN OUT BEFORE YOU GOT THE MONEY TO BUY MORE: NEVER TRUE

## 2021-04-05 ASSESSMENT — FIBROSIS 4 INDEX: FIB4 SCORE: 0.56

## 2021-04-05 NOTE — PROGRESS NOTES
CC:  Diagnoses of Exposure to nickel dust, Preventative health care, and Mild intermittent asthma without complication were pertinent to this visit.    HISTORY OF THE PRESENT ILLNESS: Patient is a 30 y.o. female. This pleasant patient is here today for annual wellness.    GI symptoms from last appointment totally resolved with change in diet and use of MiraLAX.  She did not have to use the suppositories or sitz bath's.  She does understand that we are assuming she has hemorrhoids, always potential for other sources of GI symptoms, she expresses understanding, she does not feel she needs to see GI at this time.  No recurrence of other GI symptoms.    Where she works she is exposed to nickel, she says she is very careful not to breathe the dust in but at times believes that her mask had poor seal.  In the past her company offered blood testing for nickel levels but not any longer, she would like to have this checked for preventive health reasons.    With history of alcohol use she says that she started drinking just about every day one beer or glass of wine.  She is trying to cut back to just 1 on the weekends.  Having a lot of stress at work and lack of motivation which is contributing to the alcohol use.  She also moved in with her mother-in-law and fiancé and this has been somewhat stressful as a new change.  She is interested in counseling and then evaluating if not feeling better restarting SSRI treatment. Will start exercise program.     On reconsideration she feels she never had migraines.  Has had occasional  headaches in the past, not migrainous, and none in the last few years.    Asthma is well controlled, rare use of albuterol, no current pulmonary symptoms.    Allergies: Patient has no known allergies.    Current Outpatient Medications Ordered in Epic   Medication Sig Dispense Refill   • albuterol 108 (90 Base) MCG/ACT Aero Soln inhalation aerosol Inhale 2 Puffs every four hours as needed for Shortness of  "Breath. 1 Each 6   • drospirenone-ethinyl estradiol (ARIANNE) 3-0.03 MG per tablet TAKE 1 TABLET BY MOUTH EVERY DAY 84 Tab 3     No current Epic-ordered facility-administered medications on file.       Past Medical History:   Diagnosis Date   • Anemia    • Anxiety    • Asthma    • Migraine    • Physiological ovarian cysts        Past Surgical History:   Procedure Laterality Date   • OTHER      age 12 hyperhydrosis       Social History     Tobacco Use   • Smoking status: Never Smoker   • Smokeless tobacco: Never Used   Substance Use Topics   • Alcohol use: Yes     Comment: 2 drinks a few nights/week, history of heavy use.     • Drug use: No       Social History     Social History Narrative   • Not on file       Family History   Problem Relation Age of Onset   • Heart Disease Paternal Uncle    • Heart Disease Maternal Grandmother    • Cancer Maternal Grandmother         breast   • Heart Disease Maternal Grandfather    • Alcohol abuse Maternal Grandfather    • Heart Disease Paternal Grandmother    • Cancer Paternal Grandmother         throat, tobacco use   • Heart Disease Paternal Grandfather    • Hypertension Paternal Grandfather    • Diabetes Paternal Grandfather    • Anxiety disorder Mother    • Alcohol abuse Maternal Aunt    • Suicide Attempts Neg Hx    • Schizophrenia Neg Hx    • Bipolar disorder Neg Hx        Exam: /78 (BP Location: Right arm, Patient Position: Sitting, BP Cuff Size: Adult)   Pulse 65   Temp 36.3 °C (97.4 °F) (Temporal)   Resp 18   Ht 1.753 m (5' 9\")   Wt 75.7 kg (166 lb 14.2 oz)   SpO2 97%  Body mass index is 24.65 kg/m².    General: Normal appearing. No distress.  EYES: Conjunctiva clear lids without ptosis, pupils equal  EARS: Normal shape and contour   Pulmonary: Clear to ausculation.  Normal effort. No rales or wheezing.  Cardiovascular: Regular rate and rhythm without significant murmur.   Abdomen: Soft, nontender, nondistended. Normal bowel sounds.  Neurologic: Cranial nerves " grossly nonfocal  Skin: Warm and dry.  No obvious lesions.  Musculoskeletal: Normal gait. No extremity cyanosis, clubbing, or edema.  Psych: Normal mood and affect. Alert and oriented x3. Judgment and insight is normal.      Assessment/Plan  1. Exposure to nickel dust  Reasonable to assess for any significant exposure to nickel as an occupational hazard.  - Nickel, Serum; Future    2. Preventative health care  - CBC WITHOUT DIFFERENTIAL; Future  - Comp Metabolic Panel; Future  - Lipid Profile; Future  - VITAMIN D,25 HYDROXY; Future    3. Mild intermittent asthma without complication  Stable controlled, will monitor.  - albuterol 108 (90 Base) MCG/ACT Aero Soln inhalation aerosol; Inhale 2 Puffs every four hours as needed for Shortness of Breath.  Dispense: 1 Each; Refill: 6      Advised regular exercise, cut back and stop alcohol, healthy diet, regular use of sunscreen, etc.  Advised on free counseling services to the [employer]ares Act, next appointment will follow up to see if she wants to restart SSRI treatment.        Please note that this dictation was created using voice recognition software. I have made every reasonable attempt to correct obvious errors, but I expect that there are errors of grammar and possibly content that I did not discover before finalizing the note.

## 2021-04-06 LAB — 25(OH)D3 SERPL-MCNC: 29 NG/ML (ref 30–80)

## 2021-04-07 LAB — NICKEL SERPL-MCNC: <2 UG/L

## 2021-07-15 ENCOUNTER — APPOINTMENT (OUTPATIENT)
Dept: MEDICAL GROUP | Facility: MEDICAL CENTER | Age: 31
End: 2021-07-15
Payer: COMMERCIAL

## 2021-10-07 ENCOUNTER — OFFICE VISIT (OUTPATIENT)
Dept: MEDICAL GROUP | Facility: MEDICAL CENTER | Age: 31
End: 2021-10-07
Payer: COMMERCIAL

## 2021-10-07 VITALS
OXYGEN SATURATION: 100 % | WEIGHT: 170.2 LBS | BODY MASS INDEX: 25.21 KG/M2 | DIASTOLIC BLOOD PRESSURE: 60 MMHG | HEART RATE: 60 BPM | SYSTOLIC BLOOD PRESSURE: 116 MMHG | TEMPERATURE: 97.9 F | HEIGHT: 69 IN

## 2021-10-07 DIAGNOSIS — R59.9 SWOLLEN LYMPH NODES: ICD-10-CM

## 2021-10-07 DIAGNOSIS — E55.9 VITAMIN D DEFICIENCY: ICD-10-CM

## 2021-10-07 PROCEDURE — 99212 OFFICE O/P EST SF 10 MIN: CPT | Performed by: INTERNAL MEDICINE

## 2021-10-07 ASSESSMENT — FIBROSIS 4 INDEX: FIB4 SCORE: 0.42

## 2021-10-07 NOTE — PROGRESS NOTES
CC:  Diagnoses of Swollen lymph nodes and Vitamin D deficiency were pertinent to this visit.    HISTORY OF THE PRESENT ILLNESS: Patient is a 31 y.o. female. This pleasant patient is here today for symptoms.    She says she has had some swollen lymph nodes in her neck since Monday.  This has happened in the past but seems to be more swollen than has been with prior viral type illnesses.  She feels little more tired but she thinks it may be due to recent stressors with work and moving.  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  Her mood is also very good, her commute is better, etc.  [place] and coworkers had recent viral GI symptoms.  She has no GI symptoms, no pulmonary symptoms such as dyspnea/wheeze/cough, no sore throat, no symptoms of the ear/eye.  Does have a little bit of nasal drainage from allergy symptoms but her allergy symptoms predate the swollen lymph nodes.  No fever, chills or night sweats.  No dental infections.  No sinus pain.  No other associated symptoms.    Labs reviewed from last April vitamin D is a little low at 27 she will take a supplement.    Allergies: Patient has no known allergies.    Current Outpatient Medications Ordered in Epic   Medication Sig Dispense Refill   • albuterol 108 (90 Base) MCG/ACT Aero Soln inhalation aerosol Inhale 2 Puffs every four hours as needed for Shortness of Breath. 1 Each 6     No current Epic-ordered facility-administered medications on file.       Past Medical History:   Diagnosis Date   • Anemia    • Anxiety    • Asthma    • Migraine    • Physiological ovarian cysts        Past Surgical History:   Procedure Laterality Date   • OTHER      age 12 hyperhydrosis       Social History     Tobacco Use   • Smoking status: Never Smoker   • Smokeless tobacco: Never Used   Vaping Use   • Vaping Use: Never used   Substance Use Topics   • Alcohol use: Yes     Comment: 2 drinks a few  "nights/week, history of heavy use.     • Drug use: No       Social History     Social History Narrative   • Not on file       Family History   Problem Relation Age of Onset   • Heart Disease Paternal Uncle    • Heart Disease Maternal Grandmother    • Cancer Maternal Grandmother         breast   • Heart Disease Maternal Grandfather    • Alcohol abuse Maternal Grandfather    • Heart Disease Paternal Grandmother    • Cancer Paternal Grandmother         throat, tobacco use   • Heart Disease Paternal Grandfather    • Hypertension Paternal Grandfather    • Diabetes Paternal Grandfather    • Anxiety disorder Mother    • Alcohol abuse Maternal Aunt    • Suicide Attempts Neg Hx    • Schizophrenia Neg Hx    • Bipolar disorder Neg Hx        Exam: /60 (BP Location: Left arm, Patient Position: Sitting, BP Cuff Size: Adult)   Pulse 60   Temp 36.6 °C (97.9 °F) (Temporal)   Ht 1.753 m (5' 9\")   Wt 77.2 kg (170 lb 3.2 oz)   SpO2 100%  Body mass index is 25.13 kg/m².    General: Normal appearing. No distress.  EYES: Conjunctiva clear lids without ptosis, pupils equal.  Canals and tympanic membranes clear bilaterally.  EARS: Normal shape and contour   Oropharynx clear no redness, no tonsillar swelling, exudates or abnormal findings.  Neck: Minimal swelling symmetrically lymph nodes, no significant tenderness, no heat or redness.  Full range of motion in neck.  Pulmonary: Clear to ausculation.  Normal effort. No rales or wheezing.  Cardiovascular: Regular rate and rhythm without significant murmur.   Abdomen: Soft, nontender, nondistended. Normal bowel sounds.  Neurologic: Cranial nerves grossly nonfocal  Skin: Warm and dry.  No obvious lesions.  Musculoskeletal: Normal gait. No extremity cyanosis, clubbing, or edema.  Psych: Normal mood and affect. Alert and oriented x3. Judgment and insight is normal.      Assessment/Plan  1. Swollen lymph nodes  Most likely she has mild viral syndrome, symptoms started on Monday.  " Recommend warm salt water gargles, Flonase, Xyzal to help dry secretions and any nasal drainage from her allergies.  Recommend that she be reevaluated if symptoms change or do not resolve.  Recommend she let me know if the lymph node swelling does not resolve over the next couple weeks and in this case we can arrange imaging but again suspect limited viral illness.  - CBC WITHOUT DIFFERENTIAL; Future  - Comp Metabolic Panel; Future    2. Vitamin D deficiency  Recommend cholecalciferol 1000 units over-the-counter daily.    rtc 6 m awv          Please note that this dictation was created using voice recognition software. I have made every reasonable attempt to correct obvious errors, but I expect that there are errors of grammar and possibly content that I did not discover before finalizing the note.

## 2022-05-24 ENCOUNTER — OFFICE VISIT (OUTPATIENT)
Dept: MEDICAL GROUP | Facility: PHYSICIAN GROUP | Age: 32
End: 2022-05-24
Payer: COMMERCIAL

## 2022-05-24 VITALS
HEART RATE: 70 BPM | SYSTOLIC BLOOD PRESSURE: 102 MMHG | HEIGHT: 69 IN | DIASTOLIC BLOOD PRESSURE: 62 MMHG | WEIGHT: 165.6 LBS | TEMPERATURE: 97.7 F | BODY MASS INDEX: 24.53 KG/M2 | RESPIRATION RATE: 18 BRPM | OXYGEN SATURATION: 99 %

## 2022-05-24 DIAGNOSIS — J30.2 SEASONAL ALLERGIES: ICD-10-CM

## 2022-05-24 DIAGNOSIS — Z77.018: ICD-10-CM

## 2022-05-24 DIAGNOSIS — J45.20 MILD INTERMITTENT ASTHMA WITHOUT COMPLICATION: ICD-10-CM

## 2022-05-24 DIAGNOSIS — Z00.00 PHYSICAL EXAM, ANNUAL: ICD-10-CM

## 2022-05-24 DIAGNOSIS — E55.9 VITAMIN D DEFICIENCY: ICD-10-CM

## 2022-05-24 DIAGNOSIS — F10.10 ALCOHOL ABUSE: ICD-10-CM

## 2022-05-24 DIAGNOSIS — F41.1 GENERALIZED ANXIETY DISORDER: ICD-10-CM

## 2022-05-24 DIAGNOSIS — N63.0 BREAST LUMP: ICD-10-CM

## 2022-05-24 PROCEDURE — 99214 OFFICE O/P EST MOD 30 MIN: CPT | Performed by: NURSE PRACTITIONER

## 2022-05-24 RX ORDER — ESCITALOPRAM OXALATE 20 MG/1
20 TABLET ORAL DAILY
COMMUNITY
End: 2022-06-07

## 2022-05-24 ASSESSMENT — FIBROSIS 4 INDEX: FIB4 SCORE: 0.42

## 2022-05-24 ASSESSMENT — PATIENT HEALTH QUESTIONNAIRE - PHQ9: CLINICAL INTERPRETATION OF PHQ2 SCORE: 0

## 2022-05-24 NOTE — ASSESSMENT & PLAN NOTE
Saturday night or Sunday night noted a Pea size on Self exam  Left breast  maternal grandmother passed of breat cancer  Tested for genetics in 2019 with no BRCA gene noted  Denies pain, nipple discharge  LMP 2 weeks ago 5/11/2022

## 2022-05-24 NOTE — ASSESSMENT & PLAN NOTE
Patient currently using albuterol as needed for seasonal allergies as well as sports induced asthma.  She denies any flares, difficulty breathing, shortness of breath, chest pain

## 2022-05-24 NOTE — PROGRESS NOTES
Chief Complaint   Patient presents with   • Establish Care   • Referral Needed     Derm   • Bump     Left lump under armpit      Subjective:     Karyn Dalton is a 31 y.o. female presenting to establish care    Patient here for a preventive medicine visit today and to establish care.    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  -Seatbelts, helmet use, smoke detector, gun safety discussed.  -Cholesterol screening: needs new labs  -Diabetes screening:  Needs new labs   -Reviewed sun protective barrier including sunscreen application  -Safe relationship: yes  -Blood pressure: 102/62      Cancer screenings:  Colorectal cancer screening: NA  Cervical cancer screenin- needs new pap this year  Breast cancer screening: NA    Ob-Gyn/ History:   Patient has GYN provider: None  Gyn Surgery: none  Last Pap smear: 2019  Hx of abnormal Pap smears: none  /Para: 0/0  Contraceptives: none  Are you satisfied with contraceptive: NA  Last menstrual period:    Menses: regular  bleeding. Cramping is moderate.   She does not take OTC analgesics for cramps.  No significant bloating/fluid retention, pelvic pain, or dyspareunia. No vaginal discharge      1. Seasonal allergies  New to me  Chronic and stable condition  Patient use albuterol inhaler as needed and over-the-counter allergy medication as needed    2. DAVID (generalized anxiety disorder)  New to me  Chronic stable condition  Patient currently takes 20 mg Lexapro  She does not feel that this is working for her  She would like to wean off of this  Denies SI HI    3. Mild intermittent asthma without complication  New to me  Chronic and  stable condition  Noticed mostly with sports induced exercising as well as seasonal allergies  Uses her albuterol inhaler as needed  Denies cough, shortness of breath, chest pain, flares    4. Alcohol abuse  New to me  Chronic and exacerbated condition  Patient states that she binge drinks on the weekends-Fridays, Saturday, Sunday at the most 10 drinks a day  Denies needing to have a drink first thing in the morning, drinking on the day that she is working    5. Physical exam, annual  Patient needs labs and Pap smear    6. Exposure to nickel dust  New to me  Chronic stable condition  Due to her place of work  Does not have any complications from this  States that she had lab work and testing done which did not show any nickel dust exposure    7. Vitamin D deficiency  New to me  Chronic and stable condition  Does not currently take any over-the-counter supplementation    8. Breast lump  New to me  Undiagnosed new condition with uncertain prognosis  Noted on Saturday night or Sunday night a pea size lump on her self exam of the left breast lateral quadrant  Denies breast pain, nipple discharge, discoloration or change in texture    ROS   See HPI      Current Outpatient Medications:   •  escitalopram (LEXAPRO) 20 MG tablet, Take 20 mg by mouth every day., Disp: , Rfl:   •  albuterol 108 (90 Base) MCG/ACT Aero Soln inhalation aerosol, Inhale 2 Puffs every four hours as needed for Shortness of Breath., Disp: 1 Each, Rfl: 6    Past Medical History:   Diagnosis Date   • Anemia    • Anxiety    • Asthma    • Migraine    • Physiological ovarian cysts        Past Surgical History:   Procedure Laterality Date   • OTHER      age 12 hyperhydrosis       Family History   Problem Relation Age of Onset   • Anxiety disorder Mother    • Alcohol abuse Sister    • Anxiety disorder Sister    • Alcohol abuse Maternal Aunt    • Cancer Maternal Uncle         liver, throat, lung   • Alcohol abuse Maternal Uncle    • Heart Disease Paternal  "Uncle    • No Known Problems Paternal Uncle    • Breast Cancer Maternal Grandmother    • Heart Disease Maternal Grandmother    • Cancer Maternal Grandmother         breast   • Alzheimer's Disease Maternal Grandmother    • Alzheimer's Disease Maternal Grandfather    • Heart Disease Maternal Grandfather    • Alcohol abuse Maternal Grandfather    • Heart Disease Paternal Grandmother    • Cancer Paternal Grandmother         throat, tobacco use   • Heart Attack Paternal Grandmother    • Heart Attack Paternal Grandfather    • Heart Disease Paternal Grandfather    • Hypertension Paternal Grandfather    • Diabetes Paternal Grandfather    • Suicide Attempts Neg Hx    • Schizophrenia Neg Hx    • Bipolar disorder Neg Hx        Patient has no known allergies.    Allergies, past medical history, past surgical history, family history, social history reviewed and updated    Objective:     Vitals: /62 (BP Location: Right arm, Patient Position: Sitting, BP Cuff Size: Adult)   Pulse 70   Temp 36.5 °C (97.7 °F) (Temporal)   Resp 18   Ht 1.753 m (5' 9\")   Wt 75.1 kg (165 lb 9.6 oz)   SpO2 99%   BMI 24.45 kg/m²   General: Alert, pleasant, NAD  EYES:   PERRL, EOMI, no icterus or pallor.  Conjunctivae and lids normal.   HENT:  Normocephalic.  External ears normal. Tympanic membranes pearly, opaque.  No nasal drainage present.  Oropharynx non-erythematous, mucous membranes moist.  Neck supple.   No thyromegaly or masses palpated. No cervical or supraclavicular lymphadenopathy.  Heart: Regular rate and rhythm.  S1 and S2 normal.  No murmurs appreciated.  Respiratory: Normal respiratory effort.  Clear to auscultation bilaterally.  Breast:  Breasts examined seated and supine. No skin changes, peau d'orange or nipple retraction. No discharge. No axillary or supraclavicular adenopathy. No masses or nodularity palpable.  Abdomen: Non-distended, soft, non-tender, no guarding/rebound. Bowel sounds present.  No hepatosplenomegaly.  No " hernias.  Skin: Warm, dry, no rashes.  Musculoskeletal: Gait is normal.  Moves all extremities well.    Extremities: No clubbing, cyanosis or edema noted.   Neurological: No tremors, sensation grossly intact, tone/strength normal, gait is normal, CN2-12 intact.  Psych:  Affect/mood is normal, judgement is good, memory is intact, grooming is appropriate.    Assessment/Plan:     DAVID (generalized anxiety disorder)  Patient would like to wean off Lexapro.  She is currently taking 20 mg Lexapro daily.  She does not feel that this is helping her and would like to no longer be on it.  She was following up with behavioral health for counseling but due to a leave of absence with her counselor she has not followed up with anybody.  In November she was recently seen in the ER for chest pain due to anxiety attack and panic attack.  Cardiac work-up came back negative.  She has been trending it back into her routine which made her feel better and getting back to being active.  She denies SI HI.  We will place new referral for behavioral health.      Mild intermittent asthma without complication  Patient currently using albuterol as needed for seasonal allergies as well as sports induced asthma.  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  She was not told to take anything that was over-the-counter for this.  We will get new labs     Breast lump  Saturday night or Sunday night noted a Pea size on Self exam  Left breast  maternal grandmother passed of breat cancer  Tested for genetics in 2019 with no BRCA gene noted  Denies pain, nipple discharge  LMP 2 weeks ago 5/11/2022       Discussed with patient possible alternative diagnoses, patient is to take all medications as prescribed.     If symptoms persist FU w/PCP, if symptoms worsen go to emergency room.     If experiencing any side effects from prescribed medications reports to the office immediately or go to emergency room.    Reviewed indication, dosage, usage and potential adverse effects of prescribed medications.     Reviewed risks and benefits of treatment plan. Patient verbalizes understanding of all instruction and verbally agrees to plan.    Discussed plan with the patient, and she agrees to the above.      I personally reviewed prior external notes and test results pertinent to today's visit.        Return in about 2 weeks (around 6/7/2022) for Follow-up labs.      Please note that this dictation was created using voice recognition software. I have made every reasonable attempt to correct obvious errors, but I expect that there may be errors of grammar and possibly content that I did not discover before finalizing the note.

## 2022-05-24 NOTE — ASSESSMENT & PLAN NOTE
Patient uses albuterol as needed as well as over-the-counter allergy medication when seasonal allergies get bad

## 2022-05-24 NOTE — ASSESSMENT & PLAN NOTE
Patient states that she does believe that she was noted to have vitamin D deficiency.  She was not told to take anything that was over-the-counter for this.  We will get new labs

## 2022-05-24 NOTE — ASSESSMENT & PLAN NOTE
Patient would like to wean off Lexapro.  She is currently taking 20 mg Lexapro daily.  She does not feel that this is helping her and would like to no longer be on it.  She was following up with behavioral health for counseling but due to a leave of absence with her counselor she has not followed up with anybody.  In November she was recently seen in the ER for chest pain due to anxiety attack and panic attack.  Cardiac work-up came back negative.  She has been trending it back into her routine which made her feel better and getting back to being active.  She denies SI HI.  We will place new referral for behavioral health.

## 2022-05-24 NOTE — ASSESSMENT & PLAN NOTE
States she is a functioning alcoholic on the weekends  States she drinks 10 drinks a day Friday through Sunday   Drinks beer- light beers  Denies the need to drink in the AM to get through the day   Denies hard alcohol  Wants to slow down her drinking and is using reframe ap on her phone

## 2022-05-25 PROBLEM — Z00.00 PREVENTATIVE HEALTH CARE: Status: RESOLVED | Noted: 2021-04-05 | Resolved: 2022-05-25

## 2022-06-03 ENCOUNTER — HOSPITAL ENCOUNTER (OUTPATIENT)
Dept: LAB | Facility: MEDICAL CENTER | Age: 32
End: 2022-06-03
Attending: NURSE PRACTITIONER
Payer: COMMERCIAL

## 2022-06-03 DIAGNOSIS — J45.20 MILD INTERMITTENT ASTHMA WITHOUT COMPLICATION: ICD-10-CM

## 2022-06-03 DIAGNOSIS — Z00.00 PHYSICAL EXAM, ANNUAL: ICD-10-CM

## 2022-06-03 DIAGNOSIS — F41.1 GENERALIZED ANXIETY DISORDER: ICD-10-CM

## 2022-06-03 DIAGNOSIS — F10.10 ALCOHOL ABUSE: ICD-10-CM

## 2022-06-03 LAB
ALBUMIN SERPL BCP-MCNC: 4.8 G/DL (ref 3.2–4.9)
ALBUMIN/GLOB SERPL: 1.7 G/DL
ALP SERPL-CCNC: 71 U/L (ref 30–99)
ALT SERPL-CCNC: 13 U/L (ref 2–50)
ANION GAP SERPL CALC-SCNC: 12 MMOL/L (ref 7–16)
AST SERPL-CCNC: 20 U/L (ref 12–45)
BASOPHILS # BLD AUTO: 0.7 % (ref 0–1.8)
BASOPHILS # BLD: 0.05 K/UL (ref 0–0.12)
BILIRUB SERPL-MCNC: 0.6 MG/DL (ref 0.1–1.5)
BUN SERPL-MCNC: 7 MG/DL (ref 8–22)
CALCIUM SERPL-MCNC: 9.4 MG/DL (ref 8.4–10.2)
CHLORIDE SERPL-SCNC: 102 MMOL/L (ref 96–112)
CHOLEST SERPL-MCNC: 176 MG/DL (ref 100–199)
CO2 SERPL-SCNC: 23 MMOL/L (ref 20–33)
CREAT SERPL-MCNC: 0.55 MG/DL (ref 0.5–1.4)
EOSINOPHIL # BLD AUTO: 0.23 K/UL (ref 0–0.51)
EOSINOPHIL NFR BLD: 3.2 % (ref 0–6.9)
ERYTHROCYTE [DISTWIDTH] IN BLOOD BY AUTOMATED COUNT: 43.6 FL (ref 35.9–50)
FASTING STATUS PATIENT QL REPORTED: NORMAL
GFR SERPLBLD CREATININE-BSD FMLA CKD-EPI: 123 ML/MIN/1.73 M 2
GLOBULIN SER CALC-MCNC: 2.9 G/DL (ref 1.9–3.5)
GLUCOSE SERPL-MCNC: 90 MG/DL (ref 65–99)
HCT VFR BLD AUTO: 40.2 % (ref 37–47)
HDLC SERPL-MCNC: 54 MG/DL
HGB BLD-MCNC: 13.6 G/DL (ref 12–16)
IMM GRANULOCYTES # BLD AUTO: 0.02 K/UL (ref 0–0.11)
IMM GRANULOCYTES NFR BLD AUTO: 0.3 % (ref 0–0.9)
LDLC SERPL CALC-MCNC: 103 MG/DL
LYMPHOCYTES # BLD AUTO: 2.02 K/UL (ref 1–4.8)
LYMPHOCYTES NFR BLD: 27.8 % (ref 22–41)
MAGNESIUM SERPL-MCNC: 2.1 MG/DL (ref 1.5–2.5)
MCH RBC QN AUTO: 31.6 PG (ref 27–33)
MCHC RBC AUTO-ENTMCNC: 33.8 G/DL (ref 33.6–35)
MCV RBC AUTO: 93.5 FL (ref 81.4–97.8)
MONOCYTES # BLD AUTO: 0.61 K/UL (ref 0–0.85)
MONOCYTES NFR BLD AUTO: 8.4 % (ref 0–13.4)
NEUTROPHILS # BLD AUTO: 4.33 K/UL (ref 2–7.15)
NEUTROPHILS NFR BLD: 59.6 % (ref 44–72)
NRBC # BLD AUTO: 0 K/UL
NRBC BLD-RTO: 0 /100 WBC
PLATELET # BLD AUTO: 349 K/UL (ref 164–446)
PMV BLD AUTO: 9.2 FL (ref 9–12.9)
POTASSIUM SERPL-SCNC: 3.9 MMOL/L (ref 3.6–5.5)
PROT SERPL-MCNC: 7.7 G/DL (ref 6–8.2)
RBC # BLD AUTO: 4.3 M/UL (ref 4.2–5.4)
SODIUM SERPL-SCNC: 137 MMOL/L (ref 135–145)
TRIGL SERPL-MCNC: 97 MG/DL (ref 0–149)
TSH SERPL DL<=0.005 MIU/L-ACNC: 2.33 UIU/ML (ref 0.38–5.33)
WBC # BLD AUTO: 7.3 K/UL (ref 4.8–10.8)

## 2022-06-03 PROCEDURE — 84443 ASSAY THYROID STIM HORMONE: CPT

## 2022-06-03 PROCEDURE — 80053 COMPREHEN METABOLIC PANEL: CPT

## 2022-06-03 PROCEDURE — 36415 COLL VENOUS BLD VENIPUNCTURE: CPT

## 2022-06-03 PROCEDURE — 80061 LIPID PANEL: CPT

## 2022-06-03 PROCEDURE — 82746 ASSAY OF FOLIC ACID SERUM: CPT

## 2022-06-03 PROCEDURE — 85025 COMPLETE CBC W/AUTO DIFF WBC: CPT

## 2022-06-03 PROCEDURE — 83735 ASSAY OF MAGNESIUM: CPT

## 2022-06-03 PROCEDURE — 82306 VITAMIN D 25 HYDROXY: CPT

## 2022-06-03 PROCEDURE — 82607 VITAMIN B-12: CPT

## 2022-06-04 LAB
25(OH)D3 SERPL-MCNC: 39 NG/ML (ref 30–100)
FOLATE SERPL-MCNC: 30.9 NG/ML
VIT B12 SERPL-MCNC: 501 PG/ML (ref 211–911)

## 2022-06-07 ENCOUNTER — OFFICE VISIT (OUTPATIENT)
Dept: MEDICAL GROUP | Facility: PHYSICIAN GROUP | Age: 32
End: 2022-06-07
Payer: COMMERCIAL

## 2022-06-07 VITALS
TEMPERATURE: 97.9 F | HEART RATE: 74 BPM | RESPIRATION RATE: 14 BRPM | SYSTOLIC BLOOD PRESSURE: 102 MMHG | WEIGHT: 167.6 LBS | DIASTOLIC BLOOD PRESSURE: 70 MMHG | HEIGHT: 69 IN | BODY MASS INDEX: 24.82 KG/M2 | OXYGEN SATURATION: 99 %

## 2022-06-07 DIAGNOSIS — N63.0 BREAST LUMP: ICD-10-CM

## 2022-06-07 DIAGNOSIS — E78.00 ELEVATED LDL CHOLESTEROL LEVEL: ICD-10-CM

## 2022-06-07 DIAGNOSIS — F41.1 GENERALIZED ANXIETY DISORDER: ICD-10-CM

## 2022-06-07 PROCEDURE — 99214 OFFICE O/P EST MOD 30 MIN: CPT | Performed by: NURSE PRACTITIONER

## 2022-06-07 ASSESSMENT — FIBROSIS 4 INDEX: FIB4 SCORE: 0.52

## 2022-06-08 NOTE — ASSESSMENT & PLAN NOTE
Has titrated down to 10 mg   States she is not doing a good job with taking it with regularity   Is trying to get back on the ball with exercising  Will contact    We will have her stop it as she no longer wants to take it  She understands that if she notices a up tick in drinking or if her emotions start to worsen to come back in and possibly start on a different medication

## 2022-06-08 NOTE — PATIENT INSTRUCTIONS
[facility] COUNSELING [address] SHALINI ESPINAL  [facility] [address]  Phone: [phone]   Fax: [phone]

## 2022-06-08 NOTE — ASSESSMENT & PLAN NOTE
Has not been able to feel lump since last visit. We will order US breast to r/o any abnormalities.

## 2022-06-08 NOTE — PROGRESS NOTES
CC:  Chief Complaint   Patient presents with   • Lab Results       HISTORY OF PRESENT ILLNESS: Patient is a 31 y.o. female established patient presenting lab results    1. Breast lump  Undiagnosed new condition with uncertain prognosis  Patient has not been able to feel lump since our visit  Denies pain with breast, new lumps    2. DAVDI (generalized anxiety disorder)  Chronic and stable condition  Patient was previously on 20 mg also [place] and she try to treat her self down to 10 mg Lexapro since her last visit.  Patient would actually to come off this medication.  Denies SI HI    3. Elevated LDL cholesterol level  Acute uncomplicated condition  Lab results show a elevated LDL.  Patient feels a lot of this is due to her food intake as well as lack of working out  Denies any chest pain, shortness of breath         Allergies:Patient has no known allergies.    Current Outpatient Medications   Medication Sig Dispense Refill   • Multiple Vitamin (MULTIVITAMIN ADULT PO) Take  by mouth.     • COLLAGEN PO Take  by mouth.     • albuterol 108 (90 Base) MCG/ACT Aero Soln inhalation aerosol Inhale 2 Puffs every four hours as needed for Shortness of Breath. 1 Each 6     No current facility-administered medications for this visit.       Social History     Tobacco Use   • Smoking status: Never Smoker   • Smokeless tobacco: Never Used   Vaping Use   • Vaping Use: Never used   Substance Use Topics   • Alcohol use: Yes     Alcohol/week: 18.0 oz     Types: 30 Cans of beer per week     Comment: worse is 30 beers a week on weekends   • Drug use: No     Social History     Social History Narrative   • Not on file       Family History   Problem Relation Age of Onset   • Anxiety disorder Mother    • Alcohol abuse Sister    • Anxiety disorder Sister    • Alcohol abuse Maternal Aunt    • Cancer Maternal Uncle         liver, throat, lung   • Alcohol abuse Maternal Uncle    • Heart Disease Paternal Uncle    • No Known Problems Paternal Uncle   "  • Breast Cancer Maternal Grandmother    • Heart Disease Maternal Grandmother    • Cancer Maternal Grandmother         breast   • Alzheimer's Disease Maternal Grandmother    • Alzheimer's Disease Maternal Grandfather    • Heart Disease Maternal Grandfather    • Alcohol abuse Maternal Grandfather    • Heart Disease Paternal Grandmother    • Cancer Paternal Grandmother         throat, tobacco use   • Heart Attack Paternal Grandmother    • Heart Attack Paternal Grandfather    • Heart Disease Paternal Grandfather    • Hypertension Paternal Grandfather    • Diabetes Paternal Grandfather    • Suicide Attempts Neg Hx    • Schizophrenia Neg Hx    • Bipolar disorder Neg Hx         ROS   See HPI      - NOTE: All other systems reviewed and are negative, except as in HPI.      Exam:    /70   Pulse 74   Temp 36.6 °C (97.9 °F) (Temporal)   Resp 14   Ht 1.753 m (5' 9\")   Wt 76 kg (167 lb 9.6 oz)   SpO2 99%  Body mass index is 24.75 kg/m².    General: Alert, pleasant, NAD  EYES:   PERRL, EOMI, no icterus or pallor.  Conjunctivae and lids normal.   HENT:  Normocephalic.  External ears normal.   Respiratory: Normal respiratory effort.    Musculoskeletal: Gait is normal.  Moves all extremities well.    Neurological: No tremors, sensation grossly intact, tone/strength normal, gait is normal, CN2-12 intact.  Psych:  Affect/mood is normal, judgement is good, memory is intact, grooming is appropriate.      LABS: 6/3/2022: Results reviewed and discussed with the patient, questions answered.    Assessment/Plan:  DAVID (generalized anxiety disorder)  Has titrated down to 10 mg   States she is not doing a good job with taking it with regularity   Is trying to get back on the ball with exercising  Will contact    We will have her stop it as she no longer wants to take it  She understands that if she notices a up tick in drinking or if her emotions start to worsen to come back in and possibly start on a different medication "     Elevated LDL cholesterol level  Recent labs show increase in LDL. Patent states she has not been exercising as much, eating carbs, alcohol and cheese.   Plans to get back into the swing of things for working out     Breast lump  Has not been able to feel lump since last visit. We will order US breast to r/o any abnormalities.      1. Breast lump  US-BREAST BILAT-LIMITED   2. DAVID (generalized anxiety disorder)     3. Elevated LDL cholesterol level       Discussed with patient possible alternative diagnoses, patient is to take all medications as prescribed.     If symptoms persist FU w/PCP, if symptoms worsen go to emergency room.     If experiencing any side effects from prescribed medications reports to the office immediately or go to emergency room.    Reviewed indication, dosage, usage and potential adverse effects of prescribed medications.     Reviewed risks and benefits of treatment plan. Patient verbalizes understanding of all instruction and verbally agrees to plan.      Return for schedule papsmear.     Please note that this dictation was created using voice recognition software. I have made every reasonable attempt to correct obvious errors, but I expect that there are errors of grammar and possibly content that I did not discover before finalizing the note.

## 2022-06-08 NOTE — ASSESSMENT & PLAN NOTE
Recent labs show increase in LDL. Patent states she has not been exercising as much, eating carbs, alcohol and cheese.   Plans to get back into the swing of things for working out

## 2022-06-22 DIAGNOSIS — N63.0 BREAST LUMP: ICD-10-CM

## 2022-07-05 ENCOUNTER — HOSPITAL ENCOUNTER (OUTPATIENT)
Facility: MEDICAL CENTER | Age: 32
End: 2022-07-05
Attending: NURSE PRACTITIONER
Payer: COMMERCIAL

## 2022-07-05 ENCOUNTER — OFFICE VISIT (OUTPATIENT)
Dept: MEDICAL GROUP | Facility: PHYSICIAN GROUP | Age: 32
End: 2022-07-05
Payer: COMMERCIAL

## 2022-07-05 VITALS
BODY MASS INDEX: 25.23 KG/M2 | RESPIRATION RATE: 12 BRPM | HEART RATE: 77 BPM | TEMPERATURE: 98.2 F | DIASTOLIC BLOOD PRESSURE: 68 MMHG | OXYGEN SATURATION: 96 % | HEIGHT: 69 IN | WEIGHT: 170.36 LBS | SYSTOLIC BLOOD PRESSURE: 110 MMHG

## 2022-07-05 DIAGNOSIS — Z01.419 ENCOUNTER FOR GYNECOLOGICAL EXAMINATION: ICD-10-CM

## 2022-07-05 DIAGNOSIS — D22.9 CHANGE IN MOLE: ICD-10-CM

## 2022-07-05 DIAGNOSIS — Z11.51 SCREENING FOR HPV (HUMAN PAPILLOMAVIRUS): ICD-10-CM

## 2022-07-05 DIAGNOSIS — Z01.419 WELL WOMAN EXAM WITH ROUTINE GYNECOLOGICAL EXAM: ICD-10-CM

## 2022-07-05 DIAGNOSIS — B35.4 TINEA CORPORIS: ICD-10-CM

## 2022-07-05 DIAGNOSIS — Z12.4 SCREENING FOR CERVICAL CANCER: ICD-10-CM

## 2022-07-05 PROCEDURE — 88175 CYTOPATH C/V AUTO FLUID REDO: CPT

## 2022-07-05 PROCEDURE — 87624 HPV HI-RISK TYP POOLED RSLT: CPT

## 2022-07-05 PROCEDURE — 99395 PREV VISIT EST AGE 18-39: CPT | Performed by: NURSE PRACTITIONER

## 2022-07-05 PROCEDURE — 99213 OFFICE O/P EST LOW 20 MIN: CPT | Mod: 25 | Performed by: NURSE PRACTITIONER

## 2022-07-05 RX ORDER — KETOCONAZOLE 20 MG/ML
SHAMPOO TOPICAL
Qty: 100 ML | Refills: 0 | Status: SHIPPED | OUTPATIENT
Start: 2022-07-05 | End: 2022-08-02

## 2022-07-05 ASSESSMENT — FIBROSIS 4 INDEX: FIB4 SCORE: 0.52

## 2022-07-05 NOTE — PROGRESS NOTES
S:  Karyn Dalton is a 31 y.o., female who presents today for her Pap and GYN exam.    Her LMP was 6/10/2022.    She is still having regular menses.    Her form of contraception is  None    Change in mole  New to me   Chronic and stable condition  On back and under right upper arm on trunk  Requesting referral to derm    Tinea corporis  New to me  Acute and uncomplicated condition   Was reusing makeup remover clothes and noted that is where she uses them the most  Was tryinga antifunglaht is  which seemed to work but didn't kick it complety    She is , P:0.    She has not had an Abnormal Pap previously.  Her last Mammogram was done: due at 37 yo .     Her preventative health screens are up to date.  GYN ROS:  normal menses, no abnormal bleeding, pelvic pain or discharge, she complains of left breast lump and is pending imaging    Patient Active Problem List    Diagnosis Date Noted   • Tinea corporis 2022   • Change in mole 2022   • Elevated LDL cholesterol level 2022   • Seasonal allergies 2022   • Breast lump 2022   • Vitamin D deficiency 10/07/2021   • Exposure to nickel dust 2021   • Mild intermittent asthma without complication 2019   • DAVID (generalized anxiety disorder) 2018   • Alcohol abuse 2018     Current Outpatient Medications on File Prior to Visit   Medication Sig Dispense Refill   • Multiple Vitamin (MULTIVITAMIN ADULT PO) Take  by mouth.     • COLLAGEN PO Take  by mouth.     • albuterol 108 (90 Base) MCG/ACT Aero Soln inhalation aerosol Inhale 2 Puffs every four hours as needed for Shortness of Breath. 1 Each 6     No current facility-administered medications on file prior to visit.     Social History     Tobacco Use   • Smoking status: Never Smoker   • Smokeless tobacco: Never Used   Substance Use Topics   • Alcohol use: Yes     Alcohol/week: 18.0 oz     Types: 30 Cans of beer per week     Comment: worse is 30 beers a week on  "weekends       O: /68 (BP Location: Left arm, Patient Position: Sitting, BP Cuff Size: Adult)   Pulse 77   Temp 36.8 °C (98.2 °F) (Temporal)   Resp 12   Ht 1.753 m (5' 9\")   Wt 77.3 kg (170 lb 5.8 oz)   SpO2 96%   BMI 25.16 kg/m²   Vitals Noted and Reviewed  Vulva: grossly unremarkable, no lesions or masses noted  Vagina: no abnormal discharge  Cervix: Parous, nonfriable, no surface lesions identified, Pap was performed  Uterus: Normal shape, position and consistency, Non tender  Bimanual exam: No uteromegaly, negative chandelier sign, adnexa freely movable and without enlargements bilaterally  Rectal: not performed    Assessment:  NormalGYN Exam      Plan:     1. Tinea corporis  - Referral to Dermatology  - ketoconazole (NIZORAL) 2 % shampoo; Once daily on face  Dispense: 100 mL; Refill: 0    2. Change in mole  - Referral to Dermatology    3. Screening for cervical cancer  - Thinprep Pap with HPV; Future  4. Encounter for gynecological examination  - Thinprep Pap with HPV; Future  5. Screening for HPV (human papillomavirus)  - Thinprep Pap with HPV; Future  pap smear  return annually or prn    Pap processed and sent to the lab.    Recommend follow up pending labs     "

## 2022-07-06 NOTE — ASSESSMENT & PLAN NOTE
New to me   Chronic and stable condition  On back and under right upper arm on trunk  Requesting referral to derm

## 2022-07-06 NOTE — ASSESSMENT & PLAN NOTE
New to me  Acute and uncomplicated condition   Was reusing makeup remover clothes and noted that is where she uses them the most  Was tryinga antifunglaht is  which seemed to work but didn't kick it complety

## 2022-07-07 DIAGNOSIS — Z12.4 SCREENING FOR CERVICAL CANCER: ICD-10-CM

## 2022-07-07 DIAGNOSIS — Z01.419 ENCOUNTER FOR GYNECOLOGICAL EXAMINATION: ICD-10-CM

## 2022-07-07 DIAGNOSIS — Z11.51 SCREENING FOR HPV (HUMAN PAPILLOMAVIRUS): ICD-10-CM

## 2022-07-07 LAB
CYTOLOGY REG CYTOL: NORMAL
HPV HR 12 DNA CVX QL NAA+PROBE: NEGATIVE
HPV16 DNA SPEC QL NAA+PROBE: NEGATIVE
HPV18 DNA SPEC QL NAA+PROBE: NEGATIVE
SPECIMEN SOURCE: NORMAL

## 2022-07-19 ENCOUNTER — HOSPITAL ENCOUNTER (OUTPATIENT)
Dept: RADIOLOGY | Facility: MEDICAL CENTER | Age: 32
End: 2022-07-19
Attending: STUDENT IN AN ORGANIZED HEALTH CARE EDUCATION/TRAINING PROGRAM
Payer: COMMERCIAL

## 2022-07-19 DIAGNOSIS — N63.0 BREAST LUMP: ICD-10-CM

## 2022-07-19 PROCEDURE — G0279 TOMOSYNTHESIS, MAMMO: HCPCS

## 2022-07-21 ENCOUNTER — TELEPHONE (OUTPATIENT)
Dept: MEDICAL GROUP | Facility: PHYSICIAN GROUP | Age: 32
End: 2022-07-21
Payer: COMMERCIAL

## 2022-07-21 NOTE — TELEPHONE ENCOUNTER
Called and left a detailed message on voicemail informing patientt ALLAN Chung reviewed mammogram and they Recommend routine annual screening mammography. MD

## 2022-07-21 NOTE — TELEPHONE ENCOUNTER
----- Message from RUDOLPH Pan sent at 7/19/2022  5:59 PM PDT -----  Please inform patient I reviewed her mammogram and they Recommend routine annual screening mammography

## 2022-08-02 DIAGNOSIS — B35.4 TINEA CORPORIS: ICD-10-CM

## 2022-08-02 RX ORDER — KETOCONAZOLE 20 MG/ML
SHAMPOO TOPICAL
Qty: 120 ML | Refills: 0 | Status: SHIPPED | OUTPATIENT
Start: 2022-08-02 | End: 2023-02-03

## 2022-08-10 ENCOUNTER — APPOINTMENT (RX ONLY)
Dept: URBAN - METROPOLITAN AREA CLINIC 4 | Facility: CLINIC | Age: 32
Setting detail: DERMATOLOGY
End: 2022-08-10

## 2022-08-10 DIAGNOSIS — R21 RASH AND OTHER NONSPECIFIC SKIN ERUPTION: ICD-10-CM

## 2022-08-10 DIAGNOSIS — D22 MELANOCYTIC NEVI: ICD-10-CM

## 2022-08-10 PROBLEM — D48.5 NEOPLASM OF UNCERTAIN BEHAVIOR OF SKIN: Status: ACTIVE | Noted: 2022-08-10

## 2022-08-10 PROBLEM — D22.5 MELANOCYTIC NEVI OF TRUNK: Status: ACTIVE | Noted: 2022-08-10

## 2022-08-10 PROCEDURE — 99203 OFFICE O/P NEW LOW 30 MIN: CPT

## 2022-08-10 PROCEDURE — ? COUNSELING

## 2022-08-10 PROCEDURE — ? ADDITIONAL NOTES

## 2022-08-10 PROCEDURE — ? KOH PREP

## 2022-08-10 ASSESSMENT — LOCATION DETAILED DESCRIPTION DERM
LOCATION DETAILED: LEFT SUPERIOR LATERAL LOWER BACK
LOCATION DETAILED: LEFT LATERAL SUPERIOR EYELID
LOCATION DETAILED: SUPERIOR MID FOREHEAD
LOCATION DETAILED: RIGHT RIB CAGE
LOCATION DETAILED: RIGHT LATERAL SUPERIOR EYELID
LOCATION DETAILED: RIGHT MEDIAL FOREHEAD

## 2022-08-10 ASSESSMENT — LOCATION SIMPLE DESCRIPTION DERM
LOCATION SIMPLE: RIGHT FOREHEAD
LOCATION SIMPLE: RIGHT SUPERIOR EYELID
LOCATION SIMPLE: LEFT LOWER BACK
LOCATION SIMPLE: ABDOMEN
LOCATION SIMPLE: LEFT SUPERIOR EYELID
LOCATION SIMPLE: SUPERIOR FOREHEAD

## 2022-08-10 ASSESSMENT — LOCATION ZONE DERM
LOCATION ZONE: FACE
LOCATION ZONE: TRUNK
LOCATION ZONE: EYELID

## 2022-08-10 NOTE — PROCEDURE: ADDITIONAL NOTES
Additional Notes: Trial of Lamisil AT cream due to the significant resolve with azole. To contact me if no resolve with this.
Detail Level: Simple
Render Risk Assessment In Note?: no
Additional Notes: To avoid manipulation and re-check in 1 month for clinical confirmation. If unable to confirm then bx. She was understanding of this.

## 2022-09-19 ENCOUNTER — APPOINTMENT (RX ONLY)
Dept: URBAN - METROPOLITAN AREA CLINIC 4 | Facility: CLINIC | Age: 32
Setting detail: DERMATOLOGY
End: 2022-09-19

## 2022-09-19 DIAGNOSIS — L259 CONTACT DERMATITIS AND OTHER ECZEMA, UNSPECIFIED CAUSE: ICD-10-CM

## 2022-09-19 PROBLEM — D48.5 NEOPLASM OF UNCERTAIN BEHAVIOR OF SKIN: Status: ACTIVE | Noted: 2022-09-19

## 2022-09-19 PROBLEM — L30.9 DERMATITIS, UNSPECIFIED: Status: ACTIVE | Noted: 2022-09-19

## 2022-09-19 PROCEDURE — ? COUNSELING

## 2022-09-19 PROCEDURE — 99213 OFFICE O/P EST LOW 20 MIN: CPT

## 2022-09-19 PROCEDURE — ? ADDITIONAL NOTES

## 2022-09-19 ASSESSMENT — LOCATION SIMPLE DESCRIPTION DERM
LOCATION SIMPLE: RIGHT SUPERIOR EYELID
LOCATION SIMPLE: SUPERIOR FOREHEAD
LOCATION SIMPLE: LEFT SUPERIOR EYELID

## 2022-09-19 ASSESSMENT — LOCATION ZONE DERM
LOCATION ZONE: FACE
LOCATION ZONE: EYELID

## 2022-09-19 ASSESSMENT — LOCATION DETAILED DESCRIPTION DERM
LOCATION DETAILED: LEFT MEDIAL SUPERIOR EYELID
LOCATION DETAILED: RIGHT MEDIAL SUPERIOR EYELID
LOCATION DETAILED: SUPERIOR MID FOREHEAD

## 2022-09-19 NOTE — PROCEDURE: ADDITIONAL NOTES
Additional Notes: Advised to use Vanicream.
Render Risk Assessment In Note?: no
Detail Level: Simple
Additional Notes: Discussed a differential to include bcc though not favoring. As she is pregnant, will re-check in 4 months. To contact me sooner if any change.
Additional Notes: Almost resolved. Avoidance. Vanicream line. To contact me if recurrence.

## 2022-09-20 ENCOUNTER — RX ONLY (OUTPATIENT)
Age: 32
Setting detail: RX ONLY
End: 2022-09-20

## 2022-09-20 RX ORDER — HYDROCORTISONE 25 MG/G
CREAM TOPICAL
Qty: 20 | Refills: 0 | Status: ERX | COMMUNITY
Start: 2022-09-19

## 2022-09-22 ENCOUNTER — OFFICE VISIT (OUTPATIENT)
Dept: MEDICAL GROUP | Facility: PHYSICIAN GROUP | Age: 32
End: 2022-09-22
Payer: COMMERCIAL

## 2022-09-22 VITALS
HEART RATE: 75 BPM | WEIGHT: 175 LBS | OXYGEN SATURATION: 99 % | HEIGHT: 69 IN | TEMPERATURE: 96.8 F | SYSTOLIC BLOOD PRESSURE: 112 MMHG | BODY MASS INDEX: 25.92 KG/M2 | RESPIRATION RATE: 12 BRPM | DIASTOLIC BLOOD PRESSURE: 70 MMHG

## 2022-09-22 DIAGNOSIS — O26.899 PREGNANCY RELATED NAUSEA, ANTEPARTUM: ICD-10-CM

## 2022-09-22 DIAGNOSIS — Z32.01 POSITIVE URINE PREGNANCY TEST: ICD-10-CM

## 2022-09-22 DIAGNOSIS — F10.10 ALCOHOL ABUSE: ICD-10-CM

## 2022-09-22 DIAGNOSIS — Z71.41 ALCOHOL CESSATION COUNSELING: ICD-10-CM

## 2022-09-22 DIAGNOSIS — R11.0 PREGNANCY RELATED NAUSEA, ANTEPARTUM: ICD-10-CM

## 2022-09-22 LAB
APPEARANCE UR: CLEAR
BILIRUB UR STRIP-MCNC: NEGATIVE MG/DL
COLOR UR AUTO: YELLOW
GLUCOSE UR STRIP.AUTO-MCNC: NEGATIVE MG/DL
INT CON NEG: NEGATIVE
INT CON POS: POSITIVE
KETONES UR STRIP.AUTO-MCNC: NEGATIVE MG/DL
LEUKOCYTE ESTERASE UR QL STRIP.AUTO: NEGATIVE
NITRITE UR QL STRIP.AUTO: NEGATIVE
PH UR STRIP.AUTO: 6.5 [PH] (ref 5–8)
POC URINE PREGNANCY TEST: POSITIVE
PROT UR QL STRIP: NEGATIVE MG/DL
RBC UR QL AUTO: NEGATIVE
SP GR UR STRIP.AUTO: 1.02
UROBILINOGEN UR STRIP-MCNC: 0.2 MG/DL

## 2022-09-22 PROCEDURE — 81025 URINE PREGNANCY TEST: CPT | Performed by: NURSE PRACTITIONER

## 2022-09-22 PROCEDURE — 99213 OFFICE O/P EST LOW 20 MIN: CPT | Performed by: NURSE PRACTITIONER

## 2022-09-22 PROCEDURE — 81002 URINALYSIS NONAUTO W/O SCOPE: CPT | Performed by: NURSE PRACTITIONER

## 2022-09-22 ASSESSMENT — FIBROSIS 4 INDEX: FIB4 SCORE: 0.54

## 2022-09-22 NOTE — ASSESSMENT & PLAN NOTE
Chronic and stable condition   In the past patient states she was drinking 10 light beers a day Friday and Saturdays  Cessation of alcohol consumption since 9/1/2022.   States she was previously using alcohol as a coping mechanism due to stress at work  Denies any withdrawal symptoms

## 2022-09-22 NOTE — PROGRESS NOTES
"SUBJECTIVE:  Karyn Dalton is an 32 y.o. female who presents for evaluation of possible pregnancy.  Accompanied? no    Pertinent ROS:  Patient's last menstrual period was 08/04/2022 (exact date). normal  Breast tenderness: yes  Fatigue: no  More frequent urination: yes  Nausea: yes. Is utilizing alfred lozenges for nausea at this time  Home urine HCG-  yes, positive 9/4/2022  Contraception: no  Planned? No, but excited to start a family     Alcohol abuse  Chronic and stable condition   In the past patient states she was drinking 10 light beers a day Friday and Saturdays  Cessation of alcohol consumption since 9/1/2022.   States she was previously using alcohol as a coping mechanism due to stress at work  Denies any withdrawal symptoms    Alcohol cessation counseling  Discussed at length with patient cessation of alcohol during pregnancy  Patient has ceased all alcohol since 9/1/2022  Was previously using this as a coping mechanism with stress at work on the weekends      Positive urine pregnancy test  Acute and uncomplicated condition  LMP 8/4/2022  States home pregnancy test completed 9/4/2022 was positive  Notes some low grade HA, nausea, breast tenderness, increase in urinary frequency  Denies vaginal DC, spotting or vaginal bleeding     I have reviewed PMH/SH, active problems, and medications.         Review of systems (+10 systems) unremarkable except for concerns noted by patient or items listed.  Please see HPI for additional ROS.     EXAM  VS: within normal limits  /70   Pulse 75   Temp 36 °C (96.8 °F) (Temporal)   Resp 12   Ht 1.753 m (5' 9\")   Wt 79.4 kg (175 lb)   LMP 08/04/2022 (Exact Date)   SpO2 99%   BMI 25.84 kg/m²    Physical Exam  Constitutional:       General: She is not in acute distress.     Appearance: Normal appearance. She is normal weight. She is not ill-appearing.   HENT:      Head: Normocephalic and atraumatic.      Right Ear: External ear normal.      Left Ear: " External ear normal.      Nose: Nose normal.   Cardiovascular:      Rate and Rhythm: Normal rate and regular rhythm.   Pulmonary:      Effort: Pulmonary effort is normal.      Breath sounds: Normal breath sounds.   Musculoskeletal:         General: Normal range of motion.      Cervical back: Normal range of motion.   Skin:     General: Skin is warm and dry.   Neurological:      General: No focal deficit present.      Mental Status: She is alert and oriented to person, place, and time.   Psychiatric:         Mood and Affect: Mood normal.         Behavior: Behavior normal.         Thought Content: Thought content normal.         Judgment: Judgment normal.        Urine HCG: positive  EDC: 5/15/2023    ASSESSMENT/ PLAN    ABSENCE OF MENSTRUATION    1. Positive urine pregnancy test  - POCT Pregnancy  Positive  - HCG QUANTITATIVE; Future  - Referral provided for ObGyn  - POCT Urinalysis  Lab Results   Component Value Date/Time    POCCOLOR yellow 2022 08:10 AM    POCAPPEAR clear 2022 08:10 AM    POCLEUKEST negative 2022 08:10 AM    POCNITRITE negative 2022 08:10 AM    POCUROBILIGE 0.2 2022 08:10 AM    POCPROTEIN negative 2022 08:10 AM    POCURPH 6.5 2022 08:10 AM    POCBLOOD negative 2022 08:10 AM    POCSPGRV 1.020 2022 08:10 AM    POCKETONES negative 2022 08:10 AM    POCBILIRUBIN negative 2022 08:10 AM    POCGLUCUA negative 2022 08:10 AM       2. Alcohol abuse  3. Alcohol cessation counseling  Continue with alcohol cessation     4. Pregnancy related nausea, antepartum  Discussed at length with patient options for assisting in nausea such as ginger lozenges, ginger ale, unisom and B6.    Briefly discussed pre- care options. Encouraged well-balanced diet, plenty of rest when needed, pre-cayetano vitamins daily and walking for exercise. Discussed self-help for nausea, avoiding OTC medications until consulting provider or pharmacist, other than Tylenol  PRN, minimal caffeine (1-2 cups daily) and avoiding alcohol. She will schedule her initial OB visit in the next month.      Return for as needed.

## 2022-09-22 NOTE — NON-PROVIDER
SUBJECTIVE:  Karyn Dalton is an 32 y.o. female who presents for evaluation of possible pregnancy.  Accompanied? no    Pertinent ROS:  LMP: 2022  Breast tenderness: yes  Fatigue: no  More frequent urination: yes  Nausea: yes  Home urine HCG  yes; positive  Contraception: yes, single partner, contraception - none  Planned? yes - Stopped taking OCP 2021 and has been tracking cycle since. Pregnancy is earlier than initially planned; however, patient expressing excitement and readiness for pregnancy.     I have reviewed PMH/SH, active problems, and medications.         EXAM  VS: within normal limits  GEN: no acute distress   Urine HCG: positive  EDC: 05/15/2023    ASSESSMENT/ PLAN    ABSENCE OF MENSTRUATION  Plan:  URINE PREGNANCY TEST            URINE URINALYSIS     RESULTS:  Pregnancy Test: POSITIVE.  Briefly discussed pre-cayetano care options. Pregnancy, Childbirth and the Rarden book given. Encouraged well-balanced diet, plenty of rest when needed, pre- vitamins daily and walking for exercise. Discussed self-help for nausea, avoiding OTC medications until consulting provider or pharmacist, other than Tylenol PRN, minimal caffeine (1-2 cups daily) and avoiding alcohol. She will schedule her initial OB visit in the next month with her PCP or OB provider. Feel free to call with any questions.       Urine urinalysis: Negative.     Alcohol abuse  Chronic and stable condition  Patient states has not had any alcohol since .   Reports using alcohol as a coping mechanism in the past. Discussion regarding coping mechanisms to assist with work stress and increased stress related to pregnancy. Patient verbalizes understanding of risks associated to drinking while pregnant and declines any desire to drink or concern with drinking.

## 2022-09-22 NOTE — ASSESSMENT & PLAN NOTE
Discussed at length with patient cessation of alcohol during pregnancy  Patient has ceased all alcohol since 9/1/2022  Was previously using this as a coping mechanism with stress at work on the weekends

## 2022-09-22 NOTE — ASSESSMENT & PLAN NOTE
Acute and uncomplicated condition  LMP 8/4/2022  [facility] home pregnancy test completed 9/4/2022 was positive  Notes some low grade HA, nausea, breast tenderness, increase in urinary frequency  Denies vaginal DC, spotting or vaginal bleeding

## 2022-09-26 ENCOUNTER — HOSPITAL ENCOUNTER (OUTPATIENT)
Dept: LAB | Facility: MEDICAL CENTER | Age: 32
End: 2022-09-26
Attending: NURSE PRACTITIONER
Payer: COMMERCIAL

## 2022-09-26 DIAGNOSIS — Z32.01 POSITIVE URINE PREGNANCY TEST: ICD-10-CM

## 2022-09-26 LAB — B-HCG SERPL-ACNC: ABNORMAL MIU/ML (ref 0–5)

## 2022-09-26 PROCEDURE — 84702 CHORIONIC GONADOTROPIN TEST: CPT

## 2022-09-26 PROCEDURE — 36415 COLL VENOUS BLD VENIPUNCTURE: CPT

## 2022-10-26 ENCOUNTER — HOSPITAL ENCOUNTER (EMERGENCY)
Facility: MEDICAL CENTER | Age: 32
End: 2022-10-27
Attending: EMERGENCY MEDICINE
Payer: COMMERCIAL

## 2022-10-26 ENCOUNTER — HOSPITAL ENCOUNTER (OUTPATIENT)
Dept: LAB | Facility: MEDICAL CENTER | Age: 32
End: 2022-10-26
Attending: ADVANCED PRACTICE MIDWIFE
Payer: COMMERCIAL

## 2022-10-26 DIAGNOSIS — O03.9 COMPLETE ABORTION: ICD-10-CM

## 2022-10-26 LAB — B-HCG SERPL-ACNC: 3545 MIU/ML (ref 0–5)

## 2022-10-26 PROCEDURE — 36415 COLL VENOUS BLD VENIPUNCTURE: CPT

## 2022-10-26 PROCEDURE — 84702 CHORIONIC GONADOTROPIN TEST: CPT

## 2022-10-26 PROCEDURE — 99284 EMERGENCY DEPT VISIT MOD MDM: CPT

## 2022-10-26 ASSESSMENT — FIBROSIS 4 INDEX: FIB4 SCORE: 0.54

## 2022-10-27 ENCOUNTER — APPOINTMENT (OUTPATIENT)
Dept: RADIOLOGY | Facility: MEDICAL CENTER | Age: 32
End: 2022-10-27
Attending: EMERGENCY MEDICINE
Payer: COMMERCIAL

## 2022-10-27 VITALS
RESPIRATION RATE: 18 BRPM | BODY MASS INDEX: 26.19 KG/M2 | WEIGHT: 176.81 LBS | OXYGEN SATURATION: 99 % | TEMPERATURE: 98 F | HEART RATE: 71 BPM | SYSTOLIC BLOOD PRESSURE: 134 MMHG | DIASTOLIC BLOOD PRESSURE: 78 MMHG | HEIGHT: 69 IN

## 2022-10-27 LAB
ALBUMIN SERPL BCP-MCNC: 5 G/DL (ref 3.2–4.9)
ALBUMIN/GLOB SERPL: 1.7 G/DL
ALP SERPL-CCNC: 87 U/L (ref 30–99)
ALT SERPL-CCNC: 30 U/L (ref 2–50)
ANION GAP SERPL CALC-SCNC: 13 MMOL/L (ref 7–16)
APPEARANCE UR: CLEAR
AST SERPL-CCNC: 26 U/L (ref 12–45)
B-HCG SERPL-ACNC: 3391 MIU/ML (ref 0–10)
BACTERIA #/AREA URNS HPF: ABNORMAL /HPF
BASOPHILS # BLD AUTO: 0.4 % (ref 0–1.8)
BASOPHILS # BLD: 0.06 K/UL (ref 0–0.12)
BILIRUB SERPL-MCNC: 0.4 MG/DL (ref 0.1–1.5)
BILIRUB UR QL STRIP.AUTO: NEGATIVE
BUN SERPL-MCNC: 10 MG/DL (ref 8–22)
CALCIUM SERPL-MCNC: 9.7 MG/DL (ref 8.4–10.2)
CHLORIDE SERPL-SCNC: 101 MMOL/L (ref 96–112)
CO2 SERPL-SCNC: 22 MMOL/L (ref 20–33)
COLOR UR: ABNORMAL
CREAT SERPL-MCNC: 0.54 MG/DL (ref 0.5–1.4)
EOSINOPHIL # BLD AUTO: 0.35 K/UL (ref 0–0.51)
EOSINOPHIL NFR BLD: 2.6 % (ref 0–6.9)
EPI CELLS #/AREA URNS HPF: ABNORMAL /HPF
ERYTHROCYTE [DISTWIDTH] IN BLOOD BY AUTOMATED COUNT: 41 FL (ref 35.9–50)
GFR SERPLBLD CREATININE-BSD FMLA CKD-EPI: 124 ML/MIN/1.73 M 2
GLOBULIN SER CALC-MCNC: 2.9 G/DL (ref 1.9–3.5)
GLUCOSE SERPL-MCNC: 94 MG/DL (ref 65–99)
GLUCOSE UR STRIP.AUTO-MCNC: NEGATIVE MG/DL
HCT VFR BLD AUTO: 40.6 % (ref 37–47)
HGB BLD-MCNC: 14.1 G/DL (ref 12–16)
IMM GRANULOCYTES # BLD AUTO: 0.05 K/UL (ref 0–0.11)
IMM GRANULOCYTES NFR BLD AUTO: 0.4 % (ref 0–0.9)
KETONES UR STRIP.AUTO-MCNC: NEGATIVE MG/DL
LEUKOCYTE ESTERASE UR QL STRIP.AUTO: NEGATIVE
LYMPHOCYTES # BLD AUTO: 3.68 K/UL (ref 1–4.8)
LYMPHOCYTES NFR BLD: 27.1 % (ref 22–41)
MCH RBC QN AUTO: 31.5 PG (ref 27–33)
MCHC RBC AUTO-ENTMCNC: 34.7 G/DL (ref 33.6–35)
MCV RBC AUTO: 90.6 FL (ref 81.4–97.8)
MICRO URNS: ABNORMAL
MONOCYTES # BLD AUTO: 0.93 K/UL (ref 0–0.85)
MONOCYTES NFR BLD AUTO: 6.8 % (ref 0–13.4)
NEUTROPHILS # BLD AUTO: 8.51 K/UL (ref 2–7.15)
NEUTROPHILS NFR BLD: 62.7 % (ref 44–72)
NITRITE UR QL STRIP.AUTO: NEGATIVE
NRBC # BLD AUTO: 0 K/UL
NRBC BLD-RTO: 0 /100 WBC
NUMBER OF RH DOSES IND 8505RD: NORMAL
PH UR STRIP.AUTO: 7 [PH] (ref 5–8)
PLATELET # BLD AUTO: 384 K/UL (ref 164–446)
PMV BLD AUTO: 8.9 FL (ref 9–12.9)
POTASSIUM SERPL-SCNC: 3.6 MMOL/L (ref 3.6–5.5)
PROT SERPL-MCNC: 7.9 G/DL (ref 6–8.2)
PROT UR QL STRIP: NEGATIVE MG/DL
RBC # BLD AUTO: 4.48 M/UL (ref 4.2–5.4)
RBC # URNS HPF: ABNORMAL /HPF
RBC UR QL AUTO: ABNORMAL
RH BLD: NORMAL
SODIUM SERPL-SCNC: 136 MMOL/L (ref 135–145)
SP GR UR STRIP.AUTO: <=1.005
WBC # BLD AUTO: 13.6 K/UL (ref 4.8–10.8)
WBC #/AREA URNS HPF: ABNORMAL /HPF

## 2022-10-27 PROCEDURE — 86901 BLOOD TYPING SEROLOGIC RH(D): CPT

## 2022-10-27 PROCEDURE — 76801 OB US < 14 WKS SINGLE FETUS: CPT

## 2022-10-27 PROCEDURE — 85025 COMPLETE CBC W/AUTO DIFF WBC: CPT

## 2022-10-27 PROCEDURE — 81001 URINALYSIS AUTO W/SCOPE: CPT

## 2022-10-27 PROCEDURE — 84702 CHORIONIC GONADOTROPIN TEST: CPT

## 2022-10-27 PROCEDURE — 80053 COMPREHEN METABOLIC PANEL: CPT

## 2022-10-27 NOTE — ED TRIAGE NOTES
"32 yr old female to triage  Chief Complaint   Patient presents with    Vaginal Bleeding    Abdominal Pain     Patient report of vaginal bleeding for the past week and increase abdominal cramping for the past 2 -3 days but unbearable tonight.  Continues to vaginal bleeding.  A1.  Patient saw her OBGYN on Monday and was told she is having a miscarriage and to go to the ER if her symptoms get worst.      BP (!) 146/103   Pulse 79   Temp 36.8 °C (98.3 °F) (Temporal)   Resp 16   Ht 1.753 m (5' 9\")   Wt 80.2 kg (176 lb 12.9 oz)   LMP 2022 (Exact Date)   SpO2 98%   BMI 26.11 kg/m²     "

## 2022-10-27 NOTE — ED PROVIDER NOTES
ED Provider Note  ED Provider Note    Scribed for Tevni Shay by Tevin Shay. 10/27/2022  12:05 AM    Primary care provider: RUDOLPH Pan  Means of arrival: private vehicle  History obtained from: Patient  History limited by: None    CHIEF COMPLAINT  Chief Complaint   Patient presents with    Vaginal Bleeding    Abdominal Pain     Patient report of vaginal bleeding for the past week and increase abdominal cramping for the past 2 -3 days but unbearable tonight.  Continues to vaginal bleeding.  A1.  Patient saw her OBGYN on Monday and was told she is having a miscarriage and to go to the ER if her symptoms get worst.      HPI  Karyn Dalton is a 32 y.o. female who presents to the Emergency Department for vaginal bleeding and incomplete miscarriage.  Patient tracked her menstrual cycle to 11 weeks and wanted to get a OB/GYN appointment for presumed positive pregnancy.  They did an ultrasound and there was fetal demise measured at 5 weeks.  They were trying to do conservative management versus D&C.  Today patient had vaginal bleeding and severe abdominal pain.  She came to the emergency department.  Denies any other symptoms such as lightheadedness, weakness, dizziness, chest pain, shortness of breath, diarrhea or hematuria or dysuria.  While in the emergency department she went to the bathroom and passed the fetal sac.  Her pain immediately improved.  She is obviously emotionally labile tearful about this but otherwise has no other complaints at this time.  She follows with [facility].  Quality: Vaginal bleeding  Duration: Several weeks  Severity: Mild to moderate  Associated sx: Abdominal pain    REVIEW OF SYSTEMS  As above, all other systems reviewed and are negative.   See HPI for further details.     PAST MEDICAL HISTORY   has a past medical history of Anemia, Anxiety, Asthma, Migraine, and Physiological ovarian cysts.  SURGICAL HISTORY   has a past surgical history that  includes other.  SOCIAL HISTORY  Social History     Tobacco Use    Smoking status: Never    Smokeless tobacco: Never   Vaping Use    Vaping Use: Never used   Substance Use Topics    Alcohol use: Yes     Alcohol/week: 18.0 oz     Types: 30 Cans of beer per week     Comment: worse is 30 beers a week on weekends    Drug use: No      Social History     Substance and Sexual Activity   Drug Use No     FAMILY HISTORY  Family History   Problem Relation Age of Onset    Anxiety disorder Mother     Alcohol abuse Sister     Anxiety disorder Sister     Alcohol abuse Maternal Aunt     Cancer Maternal Uncle         liver, throat, lung    Alcohol abuse Maternal Uncle     Heart Disease Paternal Uncle     No Known Problems Paternal Uncle     Breast Cancer Maternal Grandmother     Heart Disease Maternal Grandmother     Cancer Maternal Grandmother         breast    Alzheimer's Disease Maternal Grandmother     Alzheimer's Disease Maternal Grandfather     Heart Disease Maternal Grandfather     Alcohol abuse Maternal Grandfather     Heart Disease Paternal Grandmother     Cancer Paternal Grandmother         throat, tobacco use    Heart Attack Paternal Grandmother     Heart Attack Paternal Grandfather     Heart Disease Paternal Grandfather     Hypertension Paternal Grandfather     Diabetes Paternal Grandfather     Suicide Attempts Neg Hx     Schizophrenia Neg Hx     Bipolar disorder Neg Hx      CURRENT MEDICATIONS  Home Medications       Reviewed by Sherrell Ferguson R.N. (Registered Nurse) on 10/26/22 at 2346  Med List Status: Complete     Medication Last Dose Status   albuterol 108 (90 Base) MCG/ACT Aero Soln inhalation aerosol As needed Active   COLLAGEN PO  Active   hydrocortisone 2.5 % Cream topical cream As needed Active   ketoconazole (NIZORAL) 2 % shampoo  Active   Multiple Vitamin (MULTIVITAMIN ADULT PO)  Active   Prenatal Vit-Fe Fumarate-FA (PRENATAL PO) 10/25/2022 Active                  ALLERGIES  No Known Allergies  PHYSICAL  "EXAM  VITAL SIGNS:   Vitals:    10/26/22 2342 10/26/22 2344   BP:  (!) 146/103   Pulse:  79   Resp:  16   Temp:  36.8 °C (98.3 °F)   TempSrc:  Temporal   SpO2:  98%   Weight: 80.2 kg (176 lb 12.9 oz)    Height: 1.753 m (5' 9\")      Vitals: My interpretation: Hypertensive, not tachycardic, afebrile, not hypoxic    Reinterpretation of vitals: Unchanged    PE:   Gen: sitting comfortably, speaking clearly, appears in no acute distress   ENT: Mucous membranes moist, posterior pharynx clear, uvula midline, nares patent bilaterally   Neck: Supple, FROM  Pulmonary: Lungs are clear to auscultation bilaterally. No tachypnea  CV:  RRR, no murmur appreciated, pulses 2+ in both upper and lower extremities  Abdomen: soft, NT/ND; no rebound/guarding  : no CVA or suprapubic tenderness   Neuro: A&Ox4 (person, place, time, situation), speech fluent, gait steady, no focal deficits appreciated  Skin: No rash or lesions.  No pallor or jaundice.  No cyanosis.  Warm and dry.     DIAGNOSTIC STUDIES / PROCEDURES    LABS  Results for orders placed or performed during the hospital encounter of 10/26/22   CBC WITH DIFFERENTIAL   Result Value Ref Range    WBC 13.6 (H) 4.8 - 10.8 K/uL    RBC 4.48 4.20 - 5.40 M/uL    Hemoglobin 14.1 12.0 - 16.0 g/dL    Hematocrit 40.6 37.0 - 47.0 %    MCV 90.6 81.4 - 97.8 fL    MCH 31.5 27.0 - 33.0 pg    MCHC 34.7 33.6 - 35.0 g/dL    RDW 41.0 35.9 - 50.0 fL    Platelet Count 384 164 - 446 K/uL    MPV 8.9 (L) 9.0 - 12.9 fL    Neutrophils-Polys 62.70 44.00 - 72.00 %    Lymphocytes 27.10 22.00 - 41.00 %    Monocytes 6.80 0.00 - 13.40 %    Eosinophils 2.60 0.00 - 6.90 %    Basophils 0.40 0.00 - 1.80 %    Immature Granulocytes 0.40 0.00 - 0.90 %    Nucleated RBC 0.00 /100 WBC    Neutrophils (Absolute) 8.51 (H) 2.00 - 7.15 K/uL    Lymphs (Absolute) 3.68 1.00 - 4.80 K/uL    Monos (Absolute) 0.93 (H) 0.00 - 0.85 K/uL    Eos (Absolute) 0.35 0.00 - 0.51 K/uL    Baso (Absolute) 0.06 0.00 - 0.12 K/uL    Immature " Granulocytes (abs) 0.05 0.00 - 0.11 K/uL    NRBC (Absolute) 0.00 K/uL   COMP METABOLIC PANEL   Result Value Ref Range    Sodium 136 135 - 145 mmol/L    Potassium 3.6 3.6 - 5.5 mmol/L    Chloride 101 96 - 112 mmol/L    Co2 22 20 - 33 mmol/L    Anion Gap 13.0 7.0 - 16.0    Glucose 94 65 - 99 mg/dL    Bun 10 8 - 22 mg/dL    Creatinine 0.54 0.50 - 1.40 mg/dL    Calcium 9.7 8.4 - 10.2 mg/dL    AST(SGOT) 26 12 - 45 U/L    ALT(SGPT) 30 2 - 50 U/L    Alkaline Phosphatase 87 30 - 99 U/L    Total Bilirubin 0.4 0.1 - 1.5 mg/dL    Albumin 5.0 (H) 3.2 - 4.9 g/dL    Total Protein 7.9 6.0 - 8.2 g/dL    Globulin 2.9 1.9 - 3.5 g/dL    A-G Ratio 1.7 g/dL   RH TYPE FOR RHOGAM FROM E.D.   Result Value Ref Range    Emergency Department Rh Typing POS     Number Of Rh Doses Indicated ZERO    HCG QUANTITATIVE   Result Value Ref Range    Bhcg 3391.0 (H) 0.0 - 10.0 mIU/mL   URINALYSIS,CULTURE IF INDICATED    Specimen: Urine, Clean Catch; Blood   Result Value Ref Range    Color Red (A)     Character Clear     Specific Gravity <=1.005 <1.035    Ph 7.0 5.0 - 8.0    Glucose Negative Negative mg/dL    Ketones Negative Negative mg/dL    Protein Negative Negative mg/dL    Bilirubin Negative Negative    Nitrite Negative Negative    Leukocyte Esterase Negative Negative    Occult Blood Large (A) Negative    Micro Urine Req Microscopic    URINE MICROSCOPIC (W/UA)   Result Value Ref Range    WBC 0-2 /hpf    RBC 20-50 (A) /hpf    Bacteria Rare (A) None /hpf    Epithelial Cells Few Few /hpf   ESTIMATED GFR   Result Value Ref Range    GFR (CKD-EPI) 124 >60 mL/min/1.73 m 2      All labs reviewed by me. Significant for mild leukocytosis of 13, no anemia, normal electrolytes, normal renal function, normal liver enzymes, normal bilirubin, hCG 3391, pregnancy test negative    RADIOLOGY  US-OB 1ST TRIMESTER WITH TRANSVAGINAL (COMBO)   Final Result         1.  No intrauterine pregnancy identified, could represent early gestation or missed spontaneous ,  occult ectopic pregnancy is not excluded.   2.  Thickened heterogeneous endometrial stripe, could represent physiologic changes given patient age, consider endometrial pathology or retained products of conception as clinically appropriate.   3.  Anechoic left ovarian lesion favors corpus luteal cyst versus cyst, otherwise indeterminate.        The radiologist's interpretation of all radiological studies have been reviewed by me.    COURSE & MEDICAL DECISION MAKING  Nursing notes, VS, PMSFHx, labs, imaging, EKG reviewed in chart.    MDM: 12:05 AM Karyn Dalton is a 32 y.o. female who presented with vaginal bleeding in the setting of known positive pregnancy test with known fetal demise demonstrated on ultrasound previously.  Patient was awaiting natural completion of miscarriage versus D&C if she does not pass the miscarriage soon.  Tonight she had severe abdominal pain and vaginal bleeding and came to emergency department where she did pass the miscarriage in the toilet which is her treatment does appear to be a complete miscarriage.  Patient had complete resolution of pain after this.  Ultrasound was ordered to evaluate for any retained proximal of conception or complications but essentially was negative.  Pregnancy test came back showing declining level from prior.  Labs otherwise unremarkable.  Patient is very well-appearing, normal vital signs and benign physical exam and abdominal exam.  Counseled and discussed with her regarding miscarriage with completion and follow-up with OB/GYN to review labs and imaging.  Patient is amatory, well-appearing, discharge, in no acute distress verbalized understanding strict return cautions outpatient follow-up plan.    FINAL IMPRESSION  1. Complete  Acute      The note accurately reflects work and decisions made by me.  Tevin Shay  10/27/2022  12:05 AM

## 2022-10-27 NOTE — DISCHARGE INSTRUCTIONS
It appears that you have had a miscarriage.  I want you to follow-up with your OB/GYN so they can review your blood work and your imaging here with the ultrasound.  At this time is not seem to be any retained products of conception in your uterus and the miscarriage is complete.  You may have a little bit of vaginal bleeding or spotting over the next few days or weeks and this is not abnormal.  If you have fever, abdominal pain, nausea or vomiting, I want to see her OB/GYN or come back to emerge department immediately.  Thank you for coming in today.

## 2022-10-27 NOTE — ED TRIAGE NOTES
"Patient states \"I took a 1300 mg of ibuprofen and 1000 mg of tylenol yesterday for the pain and its not helping\"   "

## 2022-11-11 ENCOUNTER — OFFICE VISIT (OUTPATIENT)
Dept: MEDICAL GROUP | Facility: PHYSICIAN GROUP | Age: 32
End: 2022-11-11
Payer: COMMERCIAL

## 2022-11-11 ENCOUNTER — HOSPITAL ENCOUNTER (OUTPATIENT)
Facility: MEDICAL CENTER | Age: 32
End: 2022-11-11
Attending: NURSE PRACTITIONER
Payer: COMMERCIAL

## 2022-11-11 VITALS
HEART RATE: 71 BPM | WEIGHT: 177.2 LBS | BODY MASS INDEX: 26.25 KG/M2 | DIASTOLIC BLOOD PRESSURE: 68 MMHG | RESPIRATION RATE: 16 BRPM | HEIGHT: 69 IN | OXYGEN SATURATION: 98 % | TEMPERATURE: 97.6 F | SYSTOLIC BLOOD PRESSURE: 104 MMHG

## 2022-11-11 DIAGNOSIS — R20.8 BURNING SENSATION: ICD-10-CM

## 2022-11-11 DIAGNOSIS — R31.29 OTHER MICROSCOPIC HEMATURIA: ICD-10-CM

## 2022-11-11 PROBLEM — Z32.01 POSITIVE URINE PREGNANCY TEST: Status: RESOLVED | Noted: 2022-09-22 | Resolved: 2022-11-11

## 2022-11-11 LAB
APPEARANCE UR: CLEAR
BILIRUB UR STRIP-MCNC: NEGATIVE MG/DL
COLOR UR AUTO: YELLOW
GLUCOSE UR STRIP.AUTO-MCNC: NEGATIVE MG/DL
KETONES UR STRIP.AUTO-MCNC: NEGATIVE MG/DL
LEUKOCYTE ESTERASE UR QL STRIP.AUTO: NEGATIVE
NITRITE UR QL STRIP.AUTO: NEGATIVE
PH UR STRIP.AUTO: 6 [PH] (ref 5–8)
PROT UR QL STRIP: NEGATIVE MG/DL
RBC UR QL AUTO: NORMAL
SP GR UR STRIP.AUTO: 1.02
UROBILINOGEN UR STRIP-MCNC: NORMAL MG/DL

## 2022-11-11 PROCEDURE — 99213 OFFICE O/P EST LOW 20 MIN: CPT | Performed by: NURSE PRACTITIONER

## 2022-11-11 PROCEDURE — 87086 URINE CULTURE/COLONY COUNT: CPT

## 2022-11-11 PROCEDURE — 81002 URINALYSIS NONAUTO W/O SCOPE: CPT | Performed by: NURSE PRACTITIONER

## 2022-11-11 ASSESSMENT — FIBROSIS 4 INDEX: FIB4 SCORE: 0.42

## 2022-11-12 DIAGNOSIS — R31.29 OTHER MICROSCOPIC HEMATURIA: ICD-10-CM

## 2022-11-12 DIAGNOSIS — R20.8 BURNING SENSATION: ICD-10-CM

## 2022-11-12 NOTE — ASSESSMENT & PLAN NOTE
Acute uncomplicated condition.  Patient notes a burning sensation with urination at times almost an irritating feeling it.  She does note that the burning sensation is intermittent and has been using Azo as well as cranberry pills.  She complained at the end of October of right flank pain with her that stopped after about 2 to 3 days.   Patient unfortunately did experience a spontaneous miscarriage on 10/26/2022.  She does have some spotting that has continued to improve.    she denies chills, fevers, nausea, vomiting, lower pelvic pain, vaginal discharge.

## 2022-11-12 NOTE — PROGRESS NOTES
CC:  Chief Complaint   Patient presents with    Spotting     POS UTI Burning        HISTORY OF PRESENT ILLNESS: Patient is a 32 y.o. female established patient presenting     Burning sensation  Acute uncomplicated condition.  Patient notes a burning sensation with urination at times almost an irritating feeling it.  She does note that the burning sensation is intermittent and has been using Azo as well as cranberry pills.  She complained at the end of October of right flank pain with her that stopped after about 2 to 3 days.   Patient unfortunately did experience a spontaneous miscarriage on 10/26/2022.  She does have some spotting that has continued to improve.    she denies chills, fevers, nausea, vomiting, lower pelvic pain, vaginal discharge.       Allergies:Patient has no known allergies.    Current Outpatient Medications   Medication Sig Dispense Refill    hydrocortisone 2.5 % Cream topical cream       Prenatal Vit-Fe Fumarate-FA (PRENATAL PO) Take  by mouth.      albuterol 108 (90 Base) MCG/ACT Aero Soln inhalation aerosol Inhale 2 Puffs every four hours as needed for Shortness of Breath. 1 Each 6    ketoconazole (NIZORAL) 2 % shampoo APPLY ONCE DAILY ON FACE AS DIRECTED 120 mL 0    Multiple Vitamin (MULTIVITAMIN ADULT PO) Take  by mouth.      COLLAGEN PO Take  by mouth.       No current facility-administered medications for this visit.     Past Medical History:   Diagnosis Date    Anemia     Anxiety     Asthma     Migraine     Physiological ovarian cysts      Past Surgical History:   Procedure Laterality Date    OTHER      age 12 hyperhydrosis     Social History     Tobacco Use    Smoking status: Never    Smokeless tobacco: Never   Vaping Use    Vaping Use: Never used   Substance Use Topics    Alcohol use: Yes     Alcohol/week: 18.0 oz     Types: 30 Cans of beer per week     Comment: worse is 30 beers a week on weekends    Drug use: No     Social History     Social History Narrative    Not on file  "      Family History   Problem Relation Age of Onset    Anxiety disorder Mother     Alcohol abuse Sister     Anxiety disorder Sister     Alcohol abuse Maternal Aunt     Cancer Maternal Uncle         liver, throat, lung    Alcohol abuse Maternal Uncle     Heart Disease Paternal Uncle     No Known Problems Paternal Uncle     Breast Cancer Maternal Grandmother     Heart Disease Maternal Grandmother     Cancer Maternal Grandmother         breast    Alzheimer's Disease Maternal Grandmother     Alzheimer's Disease Maternal Grandfather     Heart Disease Maternal Grandfather     Alcohol abuse Maternal Grandfather     Heart Disease Paternal Grandmother     Cancer Paternal Grandmother         throat, tobacco use    Heart Attack Paternal Grandmother     Heart Attack Paternal Grandfather     Heart Disease Paternal Grandfather     Hypertension Paternal Grandfather     Diabetes Paternal Grandfather     Suicide Attempts Neg Hx     Schizophrenia Neg Hx     Bipolar disorder Neg Hx         ROS  See HPI      - NOTE: All other systems reviewed and are negative, except as in HPI.      Exam:    /68 (BP Location: Left arm, Patient Position: Sitting, BP Cuff Size: Adult)   Pulse 71   Temp 36.4 °C (97.6 °F) (Temporal)   Resp 16   Ht 1.753 m (5' 9\")   Wt 80.4 kg (177 lb 3.2 oz)   SpO2 98%  Body mass index is 26.17 kg/m².    General: Alert, pleasant, NAD  EYES:   PERRL, EOMI, no icterus or pallor.  Conjunctivae and lids normal.   HENT:  Normocephalic.    Abdomen: Non-distended, soft, non-tender, no guarding/rebound. Bowel sounds present.  No hepatosplenomegaly.  No hernias.  No CVA tenderness  Skin: Warm, dry, no rashes.  Musculoskeletal: Gait is normal.  Moves all extremities well.    Extremities: No clubbing, cyanosis or edema noted.   Neurological: No tremors, sensation grossly intact, tone/strength normal, gait is normal.  Psych:  Affect/mood is normal, judgement is good, memory is intact, grooming is appropriate.      LABS: " 11/11/2020: Results reviewed and discussed with the patient, questions answered.    Assessment/Plan:    1. Burning sensation  - POCT Urinalysis  Lab Results   Component Value Date/Time    POCCOLOR Yellow 11/11/2022 05:30 PM    POCAPPEAR Clear 11/11/2022 05:30 PM    POCLEUKEST Negative 11/11/2022 05:30 PM    POCNITRITE Negative 11/11/2022 05:30 PM    POCUROBILIGE 0.2 E.U./dL 11/11/2022 05:30 PM    POCPROTEIN Negative 11/11/2022 05:30 PM    POCURPH 6.0 11/11/2022 05:30 PM    POCBLOOD Trace-intact 11/11/2022 05:30 PM    POCSPGRV 1.025 11/11/2022 05:30 PM    POCKETONES Negative 11/11/2022 05:30 PM    POCBILIRUBIN Negative 11/11/2022 05:30 PM    POCGLUCUA Negative 11/11/2022 05:30 PM      - URINE CULTURE(NEW); Future    2. Other microscopic hematuria  - POCT Urinalysis  - URINE CULTURE(NEW); Future     Patient okay to continue with cranberry pills possibly pain with urination could be due to dehydration as POC UA was negative however we will send the UA for culture to verify.  Patient continues to spot however she has improved at this could be due to the most recent miscarriage and we will continue to monitor.  Patient was provided with return to urgent care or office precautions such as chills, fevers, nausea, vomiting, flank pain.    Discussed with patient possible alternative diagnoses, patient is to take all medications as prescribed.     If symptoms persist FU w/PCP, if symptoms worsen go to emergency room.     If experiencing any side effects from prescribed medications reports to the office immediately or go to emergency room.    Reviewed indication, dosage, usage and potential adverse effects of prescribed medications.     Reviewed risks and benefits of treatment plan. Patient verbalizes understanding of all instruction and verbally agrees to plan.      Return for As needed.      Please note that this dictation was created using voice recognition software. I have made every reasonable attempt to correct obvious  errors, but I expect that there are errors of grammar and possibly content that I did not discover before finalizing the note.

## 2022-11-14 LAB
BACTERIA UR CULT: NORMAL
SIGNIFICANT IND 70042: NORMAL
SITE SITE: NORMAL
SOURCE SOURCE: NORMAL

## 2022-11-23 ENCOUNTER — PATIENT MESSAGE (OUTPATIENT)
Dept: HEALTH INFORMATION MANAGEMENT | Facility: OTHER | Age: 32
End: 2022-11-23

## 2023-02-01 ENCOUNTER — OFFICE VISIT (OUTPATIENT)
Dept: MEDICAL GROUP | Facility: PHYSICIAN GROUP | Age: 33
End: 2023-02-01
Payer: COMMERCIAL

## 2023-02-01 VITALS
TEMPERATURE: 97.3 F | SYSTOLIC BLOOD PRESSURE: 108 MMHG | HEIGHT: 69 IN | WEIGHT: 178.2 LBS | BODY MASS INDEX: 26.39 KG/M2 | OXYGEN SATURATION: 93 % | HEART RATE: 63 BPM | DIASTOLIC BLOOD PRESSURE: 70 MMHG | RESPIRATION RATE: 12 BRPM

## 2023-02-01 DIAGNOSIS — R20.0 NUMBNESS AND TINGLING OF BOTH UPPER EXTREMITIES: ICD-10-CM

## 2023-02-01 DIAGNOSIS — B30.9 VIRAL CONJUNCTIVITIS, BOTH EYES: ICD-10-CM

## 2023-02-01 DIAGNOSIS — R20.2 NUMBNESS AND TINGLING OF BOTH UPPER EXTREMITIES: ICD-10-CM

## 2023-02-01 DIAGNOSIS — F41.8 ANXIETY ABOUT HEALTH: ICD-10-CM

## 2023-02-01 DIAGNOSIS — R53.83 OTHER FATIGUE: ICD-10-CM

## 2023-02-01 DIAGNOSIS — K59.00 CONSTIPATION, UNSPECIFIED CONSTIPATION TYPE: ICD-10-CM

## 2023-02-01 PROBLEM — R45.89 ANXIETY ABOUT HEALTH: Status: ACTIVE | Noted: 2023-02-01

## 2023-02-01 PROCEDURE — 99214 OFFICE O/P EST MOD 30 MIN: CPT | Performed by: NURSE PRACTITIONER

## 2023-02-01 RX ORDER — AZELASTINE HYDROCHLORIDE 0.5 MG/ML
1 SOLUTION/ DROPS OPHTHALMIC 2 TIMES DAILY
Qty: 6 ML | Refills: 0 | Status: SHIPPED | OUTPATIENT
Start: 2023-02-01 | End: 2023-06-02

## 2023-02-01 ASSESSMENT — FIBROSIS 4 INDEX: FIB4 SCORE: 0.42

## 2023-02-01 ASSESSMENT — PATIENT HEALTH QUESTIONNAIRE - PHQ9: CLINICAL INTERPRETATION OF PHQ2 SCORE: 0

## 2023-02-01 NOTE — ASSESSMENT & PLAN NOTE
Undiagnosed new condition with uncertain progression   Onset November, tested for COVID but was Negative  Does not feel normal overall  Dry eyes, crusty eyes, fatigue, Numbness to bilateral upper arms at times of rest laying down and working  Notes she was also having some chest pain but thinks it was more of anxiety.  No symptoms of arm numbness or chest pain for the last few weeks but continues to to have dry and crusty eyes as well as fatigue  Drinking mostly on weekends trying to keep it to 2 drinks but did have a more extreme night where she woke up hungover the other night.   Notes normal  strength  States increase in constipation, states occasional stiff neck, pins and needles sensation     Denies diarrhea, neck pain, shoulder pain

## 2023-02-03 NOTE — PROGRESS NOTES
CC:  Chief Complaint   Patient presents with    Eye Drainage     x3mon    Weakness     x3mon    Requesting Labs       HISTORY OF PRESENT ILLNESS: Patient is a 32 y.o. female established patient presenting     Anxiety about health  Undiagnosed new condition with uncertain progression   Onset November, tested for COVID but was Negative  Does not feel normal overall  Dry eyes, crusty eyes, fatigue, Numbness to bilateral upper arms at times of rest laying down and working  Notes she was also having some chest pain but thinks it was more of anxiety.  No symptoms of arm numbness or chest pain for the last few weeks but continues to to have dry and crusty eyes as well as fatigue  Drinking mostly on weekends trying to keep it to 2 drinks but did have a more extreme night where she woke up hungover the other night.   Notes normal  strength  States increase in constipation, states occasional stiff neck, pins and needles sensation     Denies diarrhea, neck pain, shoulder pain        Allergies:Patient has no known allergies.    Current Outpatient Medications   Medication Sig Dispense Refill    azelastine (OPTIVAR) 0.05 % ophthalmic solution Administer 1 Drop into both eyes 2 times a day. 6 mL 0    Prenatal Vit-Fe Fumarate-FA (PRENATAL PO) Take  by mouth.      albuterol 108 (90 Base) MCG/ACT Aero Soln inhalation aerosol Inhale 2 Puffs every four hours as needed for Shortness of Breath. 1 Each 6     No current facility-administered medications for this visit.     Past Medical History:   Diagnosis Date    Anemia     Anxiety     Asthma     Migraine     Physiological ovarian cysts      Past Surgical History:   Procedure Laterality Date    OTHER      age 12 hyperhydrosis     Social History     Tobacco Use    Smoking status: Never    Smokeless tobacco: Never   Vaping Use    Vaping Use: Never used   Substance Use Topics    Alcohol use: Yes     Alcohol/week: 18.0 oz     Types: 30 Cans of beer per week     Comment: worse is 30 beers  "a week on weekends    Drug use: No     Social History     Social History Narrative    Not on file       Family History   Problem Relation Age of Onset    Anxiety disorder Mother     Alcohol abuse Sister     Anxiety disorder Sister     Alcohol abuse Maternal Aunt     Cancer Maternal Uncle         liver, throat, lung    Alcohol abuse Maternal Uncle     Heart Disease Paternal Uncle     No Known Problems Paternal Uncle     Breast Cancer Maternal Grandmother     Heart Disease Maternal Grandmother     Cancer Maternal Grandmother         breast    Alzheimer's Disease Maternal Grandmother     Alzheimer's Disease Maternal Grandfather     Heart Disease Maternal Grandfather     Alcohol abuse Maternal Grandfather     Heart Disease Paternal Grandmother     Cancer Paternal Grandmother         throat, tobacco use    Heart Attack Paternal Grandmother     Heart Attack Paternal Grandfather     Heart Disease Paternal Grandfather     Hypertension Paternal Grandfather     Diabetes Paternal Grandfather     Suicide Attempts Neg Hx     Schizophrenia Neg Hx     Bipolar disorder Neg Hx         ROS  See HPI      - NOTE: All other systems reviewed and are negative, except as in HPI.      Exam:    /70 (BP Location: Left arm, Patient Position: Sitting, BP Cuff Size: Adult)   Pulse 63   Temp 36.3 °C (97.3 °F) (Temporal)   Resp 12   Ht 1.753 m (5' 9\")   Wt 80.8 kg (178 lb 3.2 oz)   SpO2 93%  Body mass index is 26.32 kg/m².    General: Alert, pleasant, NAD  EYES:   PERRL, EOMI, no icterus or pallor.  Conjunctivae and lids normal.   HENT:  Normocephalic.  External ears normal. Tympanic membranes pearly, opaque.  No nasal drainage present.  Oropharynx non-erythematous, mucous membranes moist.  Neck supple.   No thyromegaly or masses palpated. No cervical or supraclavicular lymphadenopathy.  Heart: Regular rate and rhythm.  S1 and S2 normal.  No murmurs appreciated.  Respiratory: Normal respiratory effort.  Clear to auscultation " bilaterally.  Skin: Warm, dry, no rashes.  Musculoskeletal: Gait is normal.  Moves all extremities well.    Extremities: No clubbing, cyanosis or edema noted.   Neurological: No tremors, sensation grossly intact, tone/strength normal, gait is normal.  Psych:  Affect/mood is normal, judgement is good, memory is intact, grooming is appropriate.    Assessment/Plan:    1. Anxiety about health  - CBC WITHOUT DIFFERENTIAL; Future  - IRON/TOTAL IRON BIND; Future  - TSH WITH REFLEX TO FT4; Future  - azelastine (OPTIVAR) 0.05 % ophthalmic solution; Administer 1 Drop into both eyes 2 times a day.  Dispense: 6 mL; Refill: 0    2. Other fatigue  - CBC WITHOUT DIFFERENTIAL; Future  - IRON/TOTAL IRON BIND; Future  - TSH WITH REFLEX TO FT4; Future  - VITAMIN B12; Future  - FOLATE; Future  - LYME TOTAL AB, SERUM; Future  - LYME WESTERN BLOT IGG + IGM; Future    3. Constipation, unspecified constipation type  - CBC WITHOUT DIFFERENTIAL; Future  - IRON/TOTAL IRON BIND; Future  - TSH WITH REFLEX TO FT4; Future    4. Viral conjunctivitis, both eyes  - azelastine (OPTIVAR) 0.05 % ophthalmic solution; Administer 1 Drop into both eyes 2 times a day.  Dispense: 6 mL; Refill: 0    5. Numbness and tingling of both upper extremities  - Referral to Neurodiagnostics (EEG,EP,EMG/NCS/DBS)  - VITAMIN B12; Future  - FOLATE; Future  - LYME TOTAL AB, SERUM; Future  - LYME WESTERN BLOT IGG + IGM; Future       Discussed with patient possible alternative diagnoses, patient is to take all medications as prescribed.     If symptoms persist FU w/PCP, if symptoms worsen go to emergency room.     If experiencing any side effects from prescribed medications reports to the office immediately or go to emergency room.    Reviewed indication, dosage, usage and potential adverse effects of prescribed medications.     Reviewed risks and benefits of treatment plan. Patient verbalizes understanding of all instruction and verbally agrees to plan.      Return for  Pending labs.      Please note that this dictation was created using voice recognition software. I have made every reasonable attempt to correct obvious errors, but I expect that there are errors of grammar and possibly content that I did not discover before finalizing the note.

## 2023-02-07 ENCOUNTER — HOSPITAL ENCOUNTER (OUTPATIENT)
Dept: LAB | Facility: MEDICAL CENTER | Age: 33
End: 2023-02-07
Attending: NURSE PRACTITIONER
Payer: COMMERCIAL

## 2023-02-07 DIAGNOSIS — R20.2 NUMBNESS AND TINGLING OF BOTH UPPER EXTREMITIES: ICD-10-CM

## 2023-02-07 DIAGNOSIS — R20.0 NUMBNESS AND TINGLING OF BOTH UPPER EXTREMITIES: ICD-10-CM

## 2023-02-07 DIAGNOSIS — F41.8 ANXIETY ABOUT HEALTH: ICD-10-CM

## 2023-02-07 DIAGNOSIS — K59.00 CONSTIPATION, UNSPECIFIED CONSTIPATION TYPE: ICD-10-CM

## 2023-02-07 DIAGNOSIS — R53.83 OTHER FATIGUE: ICD-10-CM

## 2023-02-07 LAB
ERYTHROCYTE [DISTWIDTH] IN BLOOD BY AUTOMATED COUNT: 40.6 FL (ref 35.9–50)
HCT VFR BLD AUTO: 41.7 % (ref 37–47)
HGB BLD-MCNC: 14.3 G/DL (ref 12–16)
MCH RBC QN AUTO: 31.2 PG (ref 27–33)
MCHC RBC AUTO-ENTMCNC: 34.3 G/DL (ref 33.6–35)
MCV RBC AUTO: 90.8 FL (ref 81.4–97.8)
PLATELET # BLD AUTO: 338 K/UL (ref 164–446)
PMV BLD AUTO: 9.3 FL (ref 9–12.9)
RBC # BLD AUTO: 4.59 M/UL (ref 4.2–5.4)
TSH SERPL DL<=0.005 MIU/L-ACNC: 2.19 UIU/ML (ref 0.38–5.33)
WBC # BLD AUTO: 8.2 K/UL (ref 4.8–10.8)

## 2023-02-07 PROCEDURE — 83540 ASSAY OF IRON: CPT

## 2023-02-07 PROCEDURE — 84443 ASSAY THYROID STIM HORMONE: CPT

## 2023-02-07 PROCEDURE — 36415 COLL VENOUS BLD VENIPUNCTURE: CPT

## 2023-02-07 PROCEDURE — 82607 VITAMIN B-12: CPT

## 2023-02-07 PROCEDURE — 82746 ASSAY OF FOLIC ACID SERUM: CPT

## 2023-02-07 PROCEDURE — 85027 COMPLETE CBC AUTOMATED: CPT

## 2023-02-07 PROCEDURE — 86618 LYME DISEASE ANTIBODY: CPT

## 2023-02-07 PROCEDURE — 83550 IRON BINDING TEST: CPT

## 2023-02-08 LAB
FOLATE SERPL-MCNC: 27.9 NG/ML
IRON SATN MFR SERPL: 28 % (ref 15–55)
IRON SERPL-MCNC: 95 UG/DL (ref 40–170)
TIBC SERPL-MCNC: 340 UG/DL (ref 250–450)
UIBC SERPL-MCNC: 245 UG/DL (ref 110–370)
VIT B12 SERPL-MCNC: 590 PG/ML (ref 211–911)

## 2023-02-09 LAB — B BURGDOR AB SER IA-ACNC: 0.32 LIV (ref 0–1.2)

## 2023-04-21 ENCOUNTER — NON-PROVIDER VISIT (OUTPATIENT)
Dept: NEUROLOGY | Facility: MEDICAL CENTER | Age: 33
End: 2023-04-21
Attending: STUDENT IN AN ORGANIZED HEALTH CARE EDUCATION/TRAINING PROGRAM
Payer: COMMERCIAL

## 2023-04-21 DIAGNOSIS — R20.0 NUMBNESS AND TINGLING OF BOTH UPPER EXTREMITIES: ICD-10-CM

## 2023-04-21 DIAGNOSIS — R20.2 NUMBNESS AND TINGLING OF BOTH UPPER EXTREMITIES: ICD-10-CM

## 2023-04-21 PROCEDURE — 95908 NRV CNDJ TST 3-4 STUDIES: CPT | Mod: 26 | Performed by: SPECIALIST

## 2023-04-21 PROCEDURE — 95886 MUSC TEST DONE W/N TEST COMP: CPT | Mod: 26 | Performed by: SPECIALIST

## 2023-04-21 PROCEDURE — 95908 NRV CNDJ TST 3-4 STUDIES: CPT | Performed by: SPECIALIST

## 2023-04-21 PROCEDURE — 95886 MUSC TEST DONE W/N TEST COMP: CPT | Performed by: SPECIALIST

## 2023-04-21 NOTE — PROCEDURES
NERVE CONDUCTION STUDIES AND ELECTROMYOGRAPHY REPORT        04/21/23      Referring provider: RUDOLPH Pan      SUMMARY OF PATIENT'S CLINICAL HISTORY,PHYSICAL EXAM, AND RATIONALE FOR TESTING:    Ms. Karyn Dalton 32 y.o. presenting with left upper extremity numbness and paresthesias intermittently.    Past Medical History is significant for :   Past Medical History:   Diagnosis Date    Anemia     Anxiety     Asthma     Migraine     Physiological ovarian cysts        The electrodiagnostic studies were performed to evaluate for possible peripheral neuropathy versus radiculopathy.      ELECTRODIAGNOSTIC EXAMINATION:  Nerve conduction studies (NCS) and electromyography (EMG) are utilized to evaluate direct or indirect damage to the peripheral nervous system. NCS are performed to measure the nerve(s) response(s) to electrostimulation across a given nerve segment. EMG evaluates the passive and active electrical activity of the muscle(s) in question.  Muscles are innervated by specific peripheral nerves and roots. Often times, several nerves the muscle to be examined in order to determine the presence or absence of the disease process. Furthermore, nerves and muscles may need to be tested in a [id number] comparison, as well as in additional extremities, as this may be crucial in characterizing the extent of the disease process, which may be diffuse or isolated and of varying degree of severity. The extent of the neurodiagnostic exam is justified as it may help arrive to a proper diagnosis, which ultimately may contribute to better management of the patient. Therefore, the nerves to muscles examined during the study were medically necessary.    Unless otherwise noted, temperature of the extremity(s) study was monitored before and during the examination and remained between 32 and 36 degrees C for the upper extremities, and between 30 and 36 degrees C for the lower extremities.      NERVE CONDUCTION  STUDIES:  Sensory nerves:   -Left median sensory nerve was examined.The response was within normal limits.  -Left ulnar sensory nerve was examined. The response was within normal limits.    Motor nerves:   -Left median motor nerve was examined. Recording electrodes placed at the Abductor Pollicis Brevis muscles. The response was within normal limits.  -Left ulnar motor nerve was examined. Recording electrodes placed at the Abductor Digiti Minimi muscles. The response was within normal limits.    No temporal dispersion or conduction block observed in any of the motor nerves examined.    LATE RESPONSES:  F waves were obtained and the following nerves: Left ulnar.  The responses within normal limits for the patient's age.        ELECTROMYOGRAPHY:  The study was performed the concentric needle electrode. Fibrillation and fasciculation activity is graded by convention from none (0) to continuous (4+).  Needle electrode examination was performed in the following muscles: Left deltoid, biceps, triceps, first dorsal interosseous, abductor pollicis brevis.  The muscles tested demonstrated normal insertional activity, normal motor unit morphology and recruitment. There were no elements suggestive of active denervation.      Nerve Conduction Studies     Stim Site NR Onset (ms) Norm Onset (ms) O-P Amp (µV) Norm O-P Amp Site1 Site2 Delta-P (ms) Dist (cm) Ace (m/s) Norm Ace (m/s)   Left Median Anti Sensory (2nd Digit)   Wrist    2.2 <3.4 51.9 >20 Wrist 2nd Digit 2.9 14.0 *48 >50   Left Ulnar Anti Sensory (5th Digit)   Wrist    2.3 <3.1 35.6 >12 Wrist 5th Digit 3.0 14.0 *47 >50        Stim Site NR Onset (ms) Norm Onset (ms) O-P Amp (mV) Norm O-P Amp Site1 Site2 Delta-0 (ms) Dist (cm) Ace (m/s) Norm Ace (m/s)   Left Median Motor (Abd Poll Brev)   Wrist    3.0 <3.9 14.3 >6 Elbow Wrist 3.5 22.0 63 >50   Elbow    6.5  13.3          Left Ulnar Motor (Abd Dig Min)   Wrist    2.7 <3.1 9.9 >7 B Elbow Wrist 2.9 19.5 67 >50   B Elbow    5.6   9.8  A Elbow B Elbow 1.4 10.0 71    A Elbow    7.0  9.7            F Wave Studies     NR F-Lat (ms) Lat Norm (ms)   Left Ulnar (Abd Dig Min)      25.14 <32                      Electromyography     Side Muscle Nerve Root Ins Act Fibs Psw Amp Dur Poly Recrt Int Pat Comment   Left Deltoid Axillary C5-6 Nml Nml Nml Nml Nml 0 Nml Nml    Left Biceps Musculocut C5-6 Nml Nml Nml Nml Nml 0 Nml Nml    Left Triceps Radial C6-7-8 Nml Nml Nml Nml Nml 0 Nml Nml    Left 1stDorInt Ulnar C8-T1 Nml Nml Nml Nml Nml 0 Nml Nml    Left Abd Poll Brev Median C8-T1 Nml Nml Nml Nml Nml 0 Nml Nml            DIAGNOSTIC INTERPRETATION:   Extensive electrodiagnostic studies were performed to the left upper extremity.  The results are as follows:    1.  Normal EMG and nerve conduction study left upper extremity.  Specifically left median ulnar motor and sensory conduction studies and left ulnar F waves were within normal limits.  There were no acute or chronic denervation changes in selected muscles studied in the left upper extremity.  There was no evidence of peripheral neuropathy, plexopathy or radiculopathy in the left upper extremity.  Clinical correlation is suggested.          COURT Ortiz M.D. (No note.)

## 2023-05-16 ENCOUNTER — TELEPHONE (OUTPATIENT)
Dept: HEALTH INFORMATION MANAGEMENT | Facility: OTHER | Age: 33
End: 2023-05-16

## 2023-06-01 SDOH — HEALTH STABILITY: PHYSICAL HEALTH: ON AVERAGE, HOW MANY DAYS PER WEEK DO YOU ENGAGE IN MODERATE TO STRENUOUS EXERCISE (LIKE A BRISK WALK)?: 3 DAYS

## 2023-06-01 SDOH — ECONOMIC STABILITY: FOOD INSECURITY: WITHIN THE PAST 12 MONTHS, THE FOOD YOU BOUGHT JUST DIDN'T LAST AND YOU DIDN'T HAVE MONEY TO GET MORE.: NEVER TRUE

## 2023-06-01 SDOH — HEALTH STABILITY: PHYSICAL HEALTH: ON AVERAGE, HOW MANY MINUTES DO YOU ENGAGE IN EXERCISE AT THIS LEVEL?: 40 MIN

## 2023-06-01 SDOH — ECONOMIC STABILITY: INCOME INSECURITY: IN THE LAST 12 MONTHS, WAS THERE A TIME WHEN YOU WERE NOT ABLE TO PAY THE MORTGAGE OR RENT ON TIME?: NO

## 2023-06-01 SDOH — ECONOMIC STABILITY: HOUSING INSECURITY: IN THE LAST 12 MONTHS, HOW MANY PLACES HAVE YOU LIVED?: 1

## 2023-06-01 SDOH — ECONOMIC STABILITY: INCOME INSECURITY: HOW HARD IS IT FOR YOU TO PAY FOR THE VERY BASICS LIKE FOOD, HOUSING, MEDICAL CARE, AND HEATING?: NOT HARD AT ALL

## 2023-06-01 SDOH — ECONOMIC STABILITY: FOOD INSECURITY: WITHIN THE PAST 12 MONTHS, YOU WORRIED THAT YOUR FOOD WOULD RUN OUT BEFORE YOU GOT MONEY TO BUY MORE.: NEVER TRUE

## 2023-06-01 SDOH — ECONOMIC STABILITY: TRANSPORTATION INSECURITY
IN THE PAST 12 MONTHS, HAS LACK OF TRANSPORTATION KEPT YOU FROM MEETINGS, WORK, OR FROM GETTING THINGS NEEDED FOR DAILY LIVING?: NO

## 2023-06-01 SDOH — HEALTH STABILITY: MENTAL HEALTH
STRESS IS WHEN SOMEONE FEELS TENSE, NERVOUS, ANXIOUS, OR CAN'T SLEEP AT NIGHT BECAUSE THEIR MIND IS TROUBLED. HOW STRESSED ARE YOU?: VERY MUCH

## 2023-06-01 ASSESSMENT — SOCIAL DETERMINANTS OF HEALTH (SDOH)
HOW MANY DRINKS CONTAINING ALCOHOL DO YOU HAVE ON A TYPICAL DAY WHEN YOU ARE DRINKING: 3 OR 4
HOW OFTEN DO YOU ATTEND CHURCH OR RELIGIOUS SERVICES?: NEVER
WITHIN THE PAST 12 MONTHS, YOU WORRIED THAT YOUR FOOD WOULD RUN OUT BEFORE YOU GOT THE MONEY TO BUY MORE: NEVER TRUE
HOW OFTEN DO YOU GET TOGETHER WITH FRIENDS OR RELATIVES?: ONCE A WEEK
HOW OFTEN DO YOU HAVE A DRINK CONTAINING ALCOHOL: 4 OR MORE TIMES A WEEK
DO YOU BELONG TO ANY CLUBS OR ORGANIZATIONS SUCH AS CHURCH GROUPS UNIONS, FRATERNAL OR ATHLETIC GROUPS, OR SCHOOL GROUPS?: NO
HOW OFTEN DO YOU ATTEND CHURCH OR RELIGIOUS SERVICES?: NEVER
HOW OFTEN DO YOU HAVE SIX OR MORE DRINKS ON ONE OCCASION: WEEKLY
HOW HARD IS IT FOR YOU TO PAY FOR THE VERY BASICS LIKE FOOD, HOUSING, MEDICAL CARE, AND HEATING?: NOT HARD AT ALL
DO YOU BELONG TO ANY CLUBS OR ORGANIZATIONS SUCH AS CHURCH GROUPS UNIONS, FRATERNAL OR ATHLETIC GROUPS, OR SCHOOL GROUPS?: NO
HOW OFTEN DO YOU ATTENT MEETINGS OF THE CLUB OR ORGANIZATION YOU BELONG TO?: NEVER
HOW OFTEN DO YOU ATTENT MEETINGS OF THE CLUB OR ORGANIZATION YOU BELONG TO?: NEVER
IN A TYPICAL WEEK, HOW MANY TIMES DO YOU TALK ON THE PHONE WITH FAMILY, FRIENDS, OR NEIGHBORS?: ONCE A WEEK
HOW OFTEN DO YOU GET TOGETHER WITH FRIENDS OR RELATIVES?: ONCE A WEEK
IN A TYPICAL WEEK, HOW MANY TIMES DO YOU TALK ON THE PHONE WITH FAMILY, FRIENDS, OR NEIGHBORS?: ONCE A WEEK

## 2023-06-01 ASSESSMENT — LIFESTYLE VARIABLES
AUDIT-C TOTAL SCORE: 8
HOW OFTEN DO YOU HAVE A DRINK CONTAINING ALCOHOL: 4 OR MORE TIMES A WEEK
SKIP TO QUESTIONS 9-10: 0
HOW MANY STANDARD DRINKS CONTAINING ALCOHOL DO YOU HAVE ON A TYPICAL DAY: 3 OR 4
HOW OFTEN DO YOU HAVE SIX OR MORE DRINKS ON ONE OCCASION: WEEKLY

## 2023-06-02 ENCOUNTER — OFFICE VISIT (OUTPATIENT)
Dept: MEDICAL GROUP | Facility: PHYSICIAN GROUP | Age: 33
End: 2023-06-02
Payer: COMMERCIAL

## 2023-06-02 VITALS
HEIGHT: 69 IN | BODY MASS INDEX: 25.92 KG/M2 | DIASTOLIC BLOOD PRESSURE: 76 MMHG | OXYGEN SATURATION: 98 % | HEART RATE: 63 BPM | WEIGHT: 175 LBS | TEMPERATURE: 97.2 F | SYSTOLIC BLOOD PRESSURE: 116 MMHG | RESPIRATION RATE: 16 BRPM

## 2023-06-02 DIAGNOSIS — D22.9 CHANGE IN MOLE: ICD-10-CM

## 2023-06-02 DIAGNOSIS — Z00.00 WELL ADULT EXAM: ICD-10-CM

## 2023-06-02 DIAGNOSIS — R05.1 ACUTE COUGH: ICD-10-CM

## 2023-06-02 DIAGNOSIS — F10.10 ALCOHOL ABUSE: ICD-10-CM

## 2023-06-02 DIAGNOSIS — J30.2 SEASONAL ALLERGIES: ICD-10-CM

## 2023-06-02 DIAGNOSIS — Z23 NEED FOR VACCINATION: ICD-10-CM

## 2023-06-02 DIAGNOSIS — F41.1 GENERALIZED ANXIETY DISORDER: ICD-10-CM

## 2023-06-02 DIAGNOSIS — J45.20 MILD INTERMITTENT ASTHMA WITHOUT COMPLICATION: ICD-10-CM

## 2023-06-02 DIAGNOSIS — K92.1 BLOOD IN THE STOOL: ICD-10-CM

## 2023-06-02 DIAGNOSIS — E55.9 VITAMIN D DEFICIENCY: ICD-10-CM

## 2023-06-02 DIAGNOSIS — Z77.018: ICD-10-CM

## 2023-06-02 DIAGNOSIS — E78.00 ELEVATED LDL CHOLESTEROL LEVEL: ICD-10-CM

## 2023-06-02 PROBLEM — B35.4 TINEA CORPORIS: Status: RESOLVED | Noted: 2022-07-05 | Resolved: 2023-06-02

## 2023-06-02 PROBLEM — R20.8 BURNING SENSATION: Status: RESOLVED | Noted: 2022-11-11 | Resolved: 2023-06-02

## 2023-06-02 PROBLEM — F41.8 ANXIETY ABOUT HEALTH: Status: RESOLVED | Noted: 2023-02-01 | Resolved: 2023-06-02

## 2023-06-02 PROBLEM — R45.89 ANXIETY ABOUT HEALTH: Status: RESOLVED | Noted: 2023-02-01 | Resolved: 2023-06-02

## 2023-06-02 PROBLEM — N63.0 BREAST LUMP: Status: RESOLVED | Noted: 2022-05-24 | Resolved: 2023-06-02

## 2023-06-02 PROCEDURE — 3074F SYST BP LT 130 MM HG: CPT | Performed by: NURSE PRACTITIONER

## 2023-06-02 PROCEDURE — 3078F DIAST BP <80 MM HG: CPT | Performed by: NURSE PRACTITIONER

## 2023-06-02 PROCEDURE — 99395 PREV VISIT EST AGE 18-39: CPT | Performed by: NURSE PRACTITIONER

## 2023-06-02 ASSESSMENT — FIBROSIS 4 INDEX: FIB4 SCORE: 0.48

## 2023-06-02 NOTE — PROGRESS NOTES
Subjective:     CC:   Chief Complaint   Patient presents with    Annual Exam    Cough     X 2 weeks       HPI:   Karyn Dalton is a 32 y.o. female who presents for annual exam. She is feeling well and denies any complaints.    Anticipatory Guidance   Advanced directive: NA   Osteoporosis Screen/ DEXA: NA   PT/vit D for falls prevention: NA   Cholesterol Screenin/3/2022   Diabetes Screening: new orders placed today   Diet: BMI 25.84 - bought a peleton bike  Exercise: BMI 25.84 either is really good or terrible food  Substance Abuse: alcohol during the weekend   Safe in relationship.   Seat belts, bike/motorcycle  helmet, gun safety discussed.  Discussed smoke detectors and CO2 detectors and recommended changing out annually.  Sun protection used. Discussed the importance of daily sunscreen use.   Dentist: follows every 6 months   Eye doctor: follows annually- contemplating Lasik     Cancer screening  Colorectal Cancer Screening: NA    Lung Cancer Screening: NA    Cervical Cancer Screening: completed 2022- NR   Breast Cancer Screening: NA     Infectious disease screening/Immunizations  --STI Screening: not interested    --Practices safe sex.  --HIV Screening: not interested   --Hepatitis C Screening: not interested   --Immunizations:    Influenza: UTD    HPV:  UTD    Tetanus: UTD    Shingles: n/a    Pneumococcal : will get today     COVID-19: not interested in further vaccines        Her preventative health screens are up to date.        She  has a past medical history of Anemia, Anxiety, Anxiety about health (2023), Asthma, Breast lump (2022), Migraine, Physiological ovarian cysts, and Tinea corporis (2022).    She has no past medical history of Psychosis (HCC), Seizure (HCC), Self-injurious behavior, Suicide attempt (HCC), or Type II or unspecified type diabetes mellitus without mention of complication, not stated as uncontrolled.  She  has a past surgical history that includes  other.    Family History   Problem Relation Age of Onset    Anxiety disorder Mother     Alcohol abuse Sister     Anxiety disorder Sister     Alcohol abuse Maternal Aunt     Cancer Maternal Uncle         liver, throat, lung    Alcohol abuse Maternal Uncle     Heart Disease Paternal Uncle     Heart Disease Paternal Uncle     Breast Cancer Maternal Grandmother     Heart Disease Maternal Grandmother     Cancer Maternal Grandmother         breast    Alzheimer's Disease Maternal Grandmother     Alzheimer's Disease Maternal Grandfather     Heart Disease Maternal Grandfather     Alcohol abuse Maternal Grandfather     Heart Disease Paternal Grandmother     Cancer Paternal Grandmother         throat, tobacco use    Heart Attack Paternal Grandmother     Heart Attack Paternal Grandfather     Heart Disease Paternal Grandfather     Hypertension Paternal Grandfather     Diabetes Paternal Grandfather     Suicide Attempts Neg Hx     Schizophrenia Neg Hx     Bipolar disorder Neg Hx        Social History     Socioeconomic History    Marital status: Single     Spouse name: Not on file    Number of children: Not on file    Years of education: Not on file    Highest education level: Master's degree (e.g., MA, MS, Angel, MEd, MSW, ALVA)   Occupational History    Not on file   Tobacco Use    Smoking status: Never    Smokeless tobacco: Never   Vaping Use    Vaping Use: Never used   Substance and Sexual Activity    Alcohol use: Yes     Alcohol/week: 8.4 oz     Types: 4 Glasses of wine, 10 Cans of beer per week     Comment: Want to cut down.    Drug use: No    Sexual activity: Yes     Partners: Male     Birth control/protection: None   Other Topics Concern    Not on file   Social History Narrative    Not on file     Social Determinants of Health     Financial Resource Strain: Low Risk  (6/1/2023)    Overall Financial Resource Strain (CARDIA)     Difficulty of Paying Living Expenses: Not hard at all   Food Insecurity: No Food Insecurity  (6/1/2023)    Hunger Vital Sign     Worried About Running Out of Food in the Last Year: Never true     Ran Out of Food in the Last Year: Never true   Transportation Needs: No Transportation Needs (6/1/2023)    PRAPARE - Transportation     Lack of Transportation (Medical): No     Lack of Transportation (Non-Medical): No   Physical Activity: Insufficiently Active (6/1/2023)    Exercise Vital Sign     Days of Exercise per Week: 3 days     Minutes of Exercise per Session: 40 min   Stress: Stress Concern Present (6/1/2023)    [facility] - Occupational Stress Questionnaire     Feeling of Stress : Very much   Social Connections: Socially Isolated (6/1/2023)    Social Connection and Isolation Panel [NHANES]     Frequency of Communication with Friends and Family: Once a week     Frequency of Social Gatherings with Friends and Family: Once a week     Attends Mandaeism Services: Never     Active Member of Clubs or Organizations: No     Attends Club or Organization Meetings: Never     Marital Status:    Intimate Partner Violence: Not on file   Housing Stability: Low Risk  (6/1/2023)    Housing Stability Vital Sign     Unable to Pay for Housing in the Last Year: No     Number of Places Lived in the Last Year: 1     Unstable Housing in the Last Year: No       Patient Active Problem List    Diagnosis Date Noted    Blood in the stool 06/02/2023    Acute cough 06/02/2023    Alcohol cessation counseling 09/22/2022    Change in mole 07/05/2022    Elevated LDL cholesterol level 06/07/2022    Seasonal allergies 05/24/2022    Vitamin D deficiency 10/07/2021    Exposure to nickel dust 04/05/2021    Mild intermittent asthma without complication 02/05/2019    DAVID (generalized anxiety disorder) 01/11/2018    Alcohol abuse 01/11/2018         Current Outpatient Medications   Medication Sig Dispense Refill    Prenatal Vit-Fe Fumarate-FA (PRENATAL PO) Take  by mouth.      albuterol 108 (90 Base) MCG/ACT Aero  "Soln inhalation aerosol Inhale 2 Puffs every four hours as needed for Shortness of Breath. 1 Each 6     No current facility-administered medications for this visit.     Allergies: Patient has no known allergies.       ROS      Objective:     /76 (BP Location: Right arm, Patient Position: Sitting, BP Cuff Size: Adult)   Pulse 63   Temp 36.2 °C (97.2 °F) (Temporal)   Resp 16   Ht 1.753 m (5' 9\")   Wt 79.4 kg (175 lb)   LMP 08/04/2022 (Exact Date)   SpO2 98%   BMI 25.84 kg/m²   Body mass index is 25.84 kg/m².  Wt Readings from Last 4 Encounters:   06/02/23 79.4 kg (175 lb)   02/01/23 80.8 kg (178 lb 3.2 oz)   11/11/22 80.4 kg (177 lb 3.2 oz)   10/26/22 80.2 kg (176 lb 12.9 oz)       Physical Exam:  Constitutional: Well-developed and well-nourished. Not diaphoretic. No distress.   Skin: Skin is warm and dry. No rash noted.  Head: Atraumatic without lesions.  Eyes: Conjunctivae and extraocular motions are normal. Pupils are equal, round, and reactive to light. No scleral icterus.   Ears:  External ears unremarkable. Tympanic membranes clear and intact.  Nose: Nares patent. Septum midline. Turbinates without erythema nor edema. No discharge.   Mouth/Throat: Dentition is normal. Tongue normal. Oropharynx is clear and moist. Posterior pharynx without erythema or exudates.  Neck: Supple, trachea midline. Normal range of motion. No thyromegaly present. No lymphadenopathy--cervical or supraclavicular.  Cardiovascular: Regular rate and rhythm, S1 and S2 without murmur, rubs, or gallops.  Lungs: Normal inspiratory effort, CTA bilaterally, no wheezes/rhonchi/rales  Abdomen: Soft, non tender, and without distention. Active bowel sounds in all four quadrants. No rebound, guarding, masses or HSM.  Extremities: No cyanosis, clubbing, erythema, nor edema. Distal pulses intact and symmetric.   Musculoskeletal: All major joints AROM full in all directions without pain.  Neurological: Alert and oriented x 3. DTRs 2+/3 and " symmetric. No cranial nerve deficit. Sensation intact.   Psychiatric:  Behavior, mood, and affect are appropriate.    Assessment and Plan:     Assessment/Plan:  Problem   Blood in The Stool    Chronic and stable condition   Patient notes a history of as a child as well as throughout the the last 2 years.  She states that she has always had issues with constipation and notes that time that there is blood in the toilet, toilet paper and at times in the stool.  She also notes a little bit of left lower quadrant pain at times.  She followed up with gastroenterology and they recommended her to get a colonoscopy which she is pending to get scheduled        Acute Cough    Acute uncomplicated condition   has noticed with working out   Has not tried her inhaler prior to working out only after   Notes towards end of day   Is not taking any allergy medication at this time        Change in Mole    Chronic and stable condition   followed up with dermatology   Was going to freeze but has not rescheduled appt   She plans to make another appointment soon       Elevated Ldl Cholesterol Level    Chronic and stable condition       Seasonal Allergies    Chronic and stable condition.    Sometimes take her allergy meds but not a lot  Does note that her eyes been more itchy and can be recently  Has not been using her azelastine eyedrops recently       Vitamin D Deficiency    Chronic and stable condition   Has not been taking over-the-counter vitamin D but is taking prenatals        Exposure to Nickel Dust    Chronic and stable condition  Continues to use proper PPE while at work       Mild Intermittent Asthma Without Complication    Chronic and stable  Sports Induced  Albuterol as needed.  She has that recently she been having a cough after she exercises and towards the end of the day.  She is not currently using albuterol prior to exercising or taking any type of allergy medication.         DAVID (generalized anxiety disorder)    Chronic  and stable condition   Denies worsening events but states that she continues to have panic attacks causing her to have slight chest discomfort.    She notes that she has been on SSRIs in the past however she weaned herself off these due to not liking to take a daily medication.    She is interested in starting a family and we discussed the safest medications would be an SSRI.  She states that she is post be following up with behavioral health but has not scheduled an appointment yet.    She also notes that she supposed to follow-up with cardiology to rule out any cardiac issues      Alcohol Abuse    Chronic and stable condition   has started back in drinking again  Had stopped drinking 9/1/2022 but since her miscarriage she has started drinking again  Notes she is not drinking during the week and then binge drinks during the weekend  Is interested in starting a family in a few months           Anxiety About Health (Resolved)   Burning Sensation (Resolved)   Tinea Corporis (Resolved)   Breast Lump (Resolved)      1. Alcohol abuse        2. Blood in the stool        3. Change in mole        4. DAVID (generalized anxiety disorder)        5. Mild intermittent asthma without complication        6. Acute cough        7. Seasonal allergies        8. Vitamin D deficiency        9. Well adult exam  Comp Metabolic Panel      10. Need for vaccination  CANCELED: Pneumococcal Conjugate Vaccine 20-Valent (19 yrs+)      11. Elevated LDL cholesterol level        12. Exposure to nickel dust           Discussed with patient possible alternative diagnoses, patient is to take all medications as prescribed.     If symptoms persist FU w/PCP, if symptoms worsen go to emergency room.     If experiencing any side effects from prescribed medications reports to the office immediately or go to emergency room.    Reviewed indication, dosage, usage and potential adverse effects of prescribed medications.     Reviewed risks and benefits of  treatment plan. Patient verbalizes understanding of all instruction and verbally agrees to plan.    HCM:  UTD   Labs per orders  Immunizations per orders  Patient counseled about skin care, diet, supplements, prenatal vitamins, safe sex and exercise.      Return in about 2 weeks (around 6/16/2023) for follow up labs.      Please note that this dictation was created using voice recognition software. I have made every reasonable attempt to correct obvious errors, but I expect that there are errors of grammar and possibly content that I did not discover before finalizing the note.

## 2023-06-07 ENCOUNTER — HOSPITAL ENCOUNTER (OUTPATIENT)
Dept: LAB | Facility: MEDICAL CENTER | Age: 33
End: 2023-06-07
Attending: NURSE PRACTITIONER
Payer: COMMERCIAL

## 2023-06-07 DIAGNOSIS — Z00.00 WELL ADULT EXAM: ICD-10-CM

## 2023-06-07 LAB
ALBUMIN SERPL BCP-MCNC: 4.5 G/DL (ref 3.2–4.9)
ALBUMIN/GLOB SERPL: 1.8 G/DL
ALP SERPL-CCNC: 74 U/L (ref 30–99)
ALT SERPL-CCNC: 20 U/L (ref 2–50)
ANION GAP SERPL CALC-SCNC: 12 MMOL/L (ref 7–16)
AST SERPL-CCNC: 25 U/L (ref 12–45)
BILIRUB SERPL-MCNC: 0.4 MG/DL (ref 0.1–1.5)
BUN SERPL-MCNC: 9 MG/DL (ref 8–22)
CALCIUM ALBUM COR SERPL-MCNC: 8.9 MG/DL (ref 8.5–10.5)
CALCIUM SERPL-MCNC: 9.3 MG/DL (ref 8.5–10.5)
CHLORIDE SERPL-SCNC: 102 MMOL/L (ref 96–112)
CO2 SERPL-SCNC: 24 MMOL/L (ref 20–33)
CREAT SERPL-MCNC: 0.64 MG/DL (ref 0.5–1.4)
FASTING STATUS PATIENT QL REPORTED: NORMAL
GFR SERPLBLD CREATININE-BSD FMLA CKD-EPI: 118 ML/MIN/1.73 M 2
GLOBULIN SER CALC-MCNC: 2.5 G/DL (ref 1.9–3.5)
GLUCOSE SERPL-MCNC: 92 MG/DL (ref 65–99)
POTASSIUM SERPL-SCNC: 4.6 MMOL/L (ref 3.6–5.5)
PROT SERPL-MCNC: 7 G/DL (ref 6–8.2)
SODIUM SERPL-SCNC: 138 MMOL/L (ref 135–145)

## 2023-06-07 PROCEDURE — 36415 COLL VENOUS BLD VENIPUNCTURE: CPT

## 2023-06-07 PROCEDURE — 80053 COMPREHEN METABOLIC PANEL: CPT

## 2023-06-15 ENCOUNTER — OFFICE VISIT (OUTPATIENT)
Dept: MEDICAL GROUP | Facility: PHYSICIAN GROUP | Age: 33
End: 2023-06-15
Payer: COMMERCIAL

## 2023-06-15 VITALS
OXYGEN SATURATION: 96 % | BODY MASS INDEX: 26.04 KG/M2 | HEIGHT: 69 IN | TEMPERATURE: 97.6 F | DIASTOLIC BLOOD PRESSURE: 70 MMHG | HEART RATE: 73 BPM | RESPIRATION RATE: 12 BRPM | WEIGHT: 175.8 LBS | SYSTOLIC BLOOD PRESSURE: 104 MMHG

## 2023-06-15 DIAGNOSIS — F41.1 GENERALIZED ANXIETY DISORDER: ICD-10-CM

## 2023-06-15 DIAGNOSIS — F10.10 ALCOHOL ABUSE: ICD-10-CM

## 2023-06-15 PROBLEM — R05.1 ACUTE COUGH: Status: RESOLVED | Noted: 2023-06-02 | Resolved: 2023-06-15

## 2023-06-15 PROCEDURE — 99214 OFFICE O/P EST MOD 30 MIN: CPT | Performed by: NURSE PRACTITIONER

## 2023-06-15 PROCEDURE — 3074F SYST BP LT 130 MM HG: CPT | Performed by: NURSE PRACTITIONER

## 2023-06-15 PROCEDURE — 3078F DIAST BP <80 MM HG: CPT | Performed by: NURSE PRACTITIONER

## 2023-06-15 RX ORDER — SERTRALINE HYDROCHLORIDE 25 MG/1
25 TABLET, FILM COATED ORAL DAILY
Qty: 60 TABLET | Refills: 1 | Status: SHIPPED | OUTPATIENT
Start: 2023-06-15 | End: 2024-03-10

## 2023-06-15 RX ORDER — CHLORPHENIRAMINE MALEATE 4 MG/1
4 TABLET ORAL EVERY 6 HOURS PRN
COMMUNITY
End: 2024-03-10

## 2023-06-15 ASSESSMENT — ENCOUNTER SYMPTOMS
DEPRESSION: 1
CHILLS: 0
SHORTNESS OF BREATH: 0
PALPITATIONS: 1
NERVOUS/ANXIOUS: 1
WEIGHT LOSS: 0
FEVER: 0

## 2023-06-15 ASSESSMENT — FIBROSIS 4 INDEX: FIB4 SCORE: 0.56

## 2023-06-15 NOTE — PROGRESS NOTES
CC:  Chief Complaint   Patient presents with    Lab Results    Follow-Up       HISTORY OF PRESENT ILLNESS: Patient is a 32 y.o. female established patient presenting     Problem   DAVID (generalized anxiety disorder)    Is interested in starting on medication for her anxiety   She is also interested in starting a family and wants something she can carry through her pregnancy  Has been on lexapro in the past     6/2/2023  Chronic and stable condition   Denies worsening events but states that she continues to have panic attacks causing her to have slight chest discomfort.    She notes that she has been on SSRIs in the past however she weaned herself off these due to not liking to take a daily medication.    She is interested in starting a family and we discussed the safest medications would be an SSRI.  She states that she is post be following up with behavioral health but has not scheduled an appointment yet.    She also notes that she supposed to follow-up with cardiology to rule out any cardiac issues      Alcohol Abuse    States 10 drinks in one day on Sunday champagne, beer or wine mostly   She is interested in stopping again and pressuring a family in the future  Feels support with her  but also notes that they probably enable each other with alcohol on weekends      6/2/2023  Chronic and stable condition   has started back in drinking again  Had stopped drinking 9/1/2022 but since her miscarriage she has started drinking again  Notes she is not drinking during the week and then binge drinks during the weekend  Is interested in starting a family in a few months         Acute Cough (Resolved)    Acute uncomplicated condition   has noticed with working out   Has not tried her inhaler prior to working out only after   Notes towards end of day   Is not taking any allergy medication at this time            Review of Systems   Constitutional:  Negative for chills, fever and weight loss.   Respiratory:  Negative  "for shortness of breath.    Cardiovascular:  Positive for palpitations. Negative for chest pain.   Psychiatric/Behavioral:  Positive for depression. The patient is nervous/anxious.              - NOTE: All other systems reviewed and are negative, except as in HPI.      Exam:    /70 (BP Location: Right arm, Patient Position: Sitting, BP Cuff Size: Adult)   Pulse 73   Temp 36.4 °C (97.6 °F) (Temporal)   Resp 12   Ht 1.753 m (5' 9\")   Wt 79.7 kg (175 lb 12.8 oz)   SpO2 96%  Body mass index is 25.96 kg/m².    Physical Exam  Constitutional:       General: She is not in acute distress.     Appearance: Normal appearance. She is normal weight. She is not ill-appearing.   HENT:      Head: Normocephalic and atraumatic.   Pulmonary:      Effort: Pulmonary effort is normal.   Musculoskeletal:      Cervical back: Normal range of motion.   Neurological:      Mental Status: She is alert and oriented to person, place, and time.   Psychiatric:         Mood and Affect: Mood normal.         Behavior: Behavior normal.         Thought Content: Thought content normal.         Judgment: Judgment normal.           Assessment/Plan:    1. DAVID (generalized anxiety disorder)  Chronic and stable condition.  We will follow-up with patient in 3 to 4 weeks and see how she is doing.  Did discuss with patient that if the 25 seems to be working but she feels that she could use a little bit more to a go ahead and increase to 50 mg before our next exam.  - sertraline (ZOLOFT) 25 MG tablet; Take 1 Tablet by mouth every day.  Dispense: 60 Tablet; Refill: 1    2. Alcohol abuse  Chronic and stable condition.  Patient plans to follow-up with better health regarding getting back on telemedicine counseling       Discussed with patient possible alternative diagnoses, patient is to take all medications as prescribed.     If symptoms persist FU w/PCP, if symptoms worsen go to emergency room.     If experiencing any side effects from prescribed " medications reports to the office immediately or go to emergency room.    Reviewed indication, dosage, usage and potential adverse effects of prescribed medications.     Reviewed risks and benefits of treatment plan. Patient verbalizes understanding of all instruction and verbally agrees to plan.      Return in about 4 weeks (around 7/13/2023) for Follow-up sertraline.      Please note that this dictation was created using voice recognition software. I have made every reasonable attempt to correct obvious errors, but I expect that there are errors of grammar and possibly content that I did not discover before finalizing the note.

## 2023-07-26 ENCOUNTER — APPOINTMENT (OUTPATIENT)
Dept: CARDIOLOGY | Facility: MEDICAL CENTER | Age: 33
End: 2023-07-26
Attending: INTERNAL MEDICINE
Payer: COMMERCIAL

## 2023-08-07 ENCOUNTER — OFFICE VISIT (OUTPATIENT)
Dept: URGENT CARE | Facility: CLINIC | Age: 33
End: 2023-08-07
Payer: COMMERCIAL

## 2023-08-07 VITALS
BODY MASS INDEX: 25.56 KG/M2 | RESPIRATION RATE: 16 BRPM | SYSTOLIC BLOOD PRESSURE: 126 MMHG | WEIGHT: 172.6 LBS | OXYGEN SATURATION: 99 % | DIASTOLIC BLOOD PRESSURE: 70 MMHG | HEIGHT: 69 IN | HEART RATE: 62 BPM | TEMPERATURE: 97.4 F

## 2023-08-07 DIAGNOSIS — J06.9 VIRAL URI: ICD-10-CM

## 2023-08-07 DIAGNOSIS — R50.9 FEVER, UNSPECIFIED FEVER CAUSE: ICD-10-CM

## 2023-08-07 DIAGNOSIS — J02.9 SORE THROAT: ICD-10-CM

## 2023-08-07 PROCEDURE — 87651 STREP A DNA AMP PROBE: CPT | Performed by: PHYSICIAN ASSISTANT

## 2023-08-07 PROCEDURE — 99213 OFFICE O/P EST LOW 20 MIN: CPT | Performed by: PHYSICIAN ASSISTANT

## 2023-08-07 PROCEDURE — 3078F DIAST BP <80 MM HG: CPT | Performed by: PHYSICIAN ASSISTANT

## 2023-08-07 PROCEDURE — 3074F SYST BP LT 130 MM HG: CPT | Performed by: PHYSICIAN ASSISTANT

## 2023-08-07 PROCEDURE — 0241U POCT CEPHEID COV-2, FLU A/B, RSV - PCR: CPT | Performed by: PHYSICIAN ASSISTANT

## 2023-08-07 ASSESSMENT — FIBROSIS 4 INDEX: FIB4 SCORE: 0.56

## 2023-08-07 NOTE — LETTER
[facility]  [address]  [place] [address]     August 7, 2023    Patient: Karyn Dalton   YOB: 1990   Date of Visit: 8/7/2023       To Whom It May Concern:    Karyn Dalton was seen and treated in our department on 8/7/2023. Please excuse patient from work today and tomorrow pending further testing.    Sincerely,     Chyna Calloway P.A.-C.

## 2023-08-08 LAB
FLUAV RNA SPEC QL NAA+PROBE: NEGATIVE
FLUBV RNA SPEC QL NAA+PROBE: NEGATIVE
RSV RNA SPEC QL NAA+PROBE: NEGATIVE
S PYO DNA SPEC NAA+PROBE: NOT DETECTED
SARS-COV-2 RNA RESP QL NAA+PROBE: NEGATIVE

## 2023-08-08 NOTE — PROGRESS NOTES
Subjective:   Karyn Dalton is a 32 y.o. female who presents for Fever (X4days, On and of fevers, fatigue, has a lot of mucus, sore throat at first but that has pretty much got better, sob, nasal congestion, fatigue, headache, states she is 9 weeks pregnant )     This a very pleasant 32-year female who is 9 weeks pregnant   Reports Thursday morning felt a little off, slight temp to 100.3 max  Nasal congestion, ST (resolved), chest congestion, fatigue, myalgias  Home all weekend, resting, sleeping, gelt better last night  This AM ongoing congestion  Anti-pyretic at 2 pm today  No abnormal vaginal bleeding  No shortness of breath  No leg swelling    Medications:  albuterol Aers  chlorpheniramine Tabs  PRENATAL PO  sertraline    Allergies:             Patient has no known allergies.    Surgical History:         Past Surgical History:   Procedure Laterality Date    OTHER      age 12 hyperhydrosis       Past Social Hx:  Karyn Dalton  reports that she has never smoked. She has never used smokeless tobacco. She reports that she does not currently use alcohol after a past usage of about 8.4 oz of alcohol per week. She reports that she does not use drugs.     Past Family Hx:   Karyn Dalton family history includes Alcohol abuse in her maternal aunt, maternal grandfather, maternal uncle, and sister; Alzheimer's Disease in her maternal grandfather and maternal grandmother; Anxiety disorder in her mother and sister; Breast Cancer in her maternal grandmother; Cancer in her maternal grandmother, maternal uncle, and paternal grandmother; Diabetes in her paternal grandfather; Heart Attack in her paternal grandfather and paternal grandmother; Heart Disease in her maternal grandfather, maternal grandmother, paternal grandfather, paternal grandmother, paternal uncle, and paternal uncle; Hypertension in her paternal grandfather.       Problem list, medications, and allergies reviewed by myself today in  "Epic.     Objective:     /70   Pulse 62   Temp 36.3 °C (97.4 °F) (Temporal)   Resp 16   Ht 1.753 m (5' 9\")   Wt 78.3 kg (172 lb 9.6 oz)   LMP 08/04/2022 (Exact Date)   SpO2 99%   BMI 25.49 kg/m²     Physical Exam  Vitals and nursing note reviewed.   Constitutional:       General: She is not in acute distress.     Appearance: Normal appearance. She is not ill-appearing or toxic-appearing.   HENT:      Head: Normocephalic.      Jaw: No trismus.      Right Ear: External ear normal. No drainage. A middle ear effusion is present. No mastoid tenderness. Tympanic membrane is not erythematous or bulging.      Left Ear: External ear normal. No drainage. A middle ear effusion is present. No mastoid tenderness. Tympanic membrane is not erythematous or bulging.      Nose: Congestion and rhinorrhea present.      Right Turbinates: Enlarged and swollen.      Left Turbinates: Enlarged and swollen.      Mouth/Throat:      Mouth: Mucous membranes are moist.      Tongue: No lesions. Tongue does not deviate from midline.      Palate: No lesions.      Pharynx: Uvula midline. Posterior oropharyngeal erythema present. No oropharyngeal exudate or uvula swelling.      Tonsils: No tonsillar exudate or tonsillar abscesses.   Eyes:      General:         Right eye: No discharge.         Left eye: No discharge.      Extraocular Movements: Extraocular movements intact.   Cardiovascular:      Rate and Rhythm: Normal rate and regular rhythm.      Pulses: Normal pulses.      Heart sounds: Normal heart sounds. No murmur heard.  Pulmonary:      Effort: Pulmonary effort is normal. No accessory muscle usage or respiratory distress.      Breath sounds: Normal breath sounds and air entry. No stridor or decreased air movement. No wheezing, rhonchi or rales.      Comments: Lungs CTA b/l  Musculoskeletal:      Cervical back: Normal range of motion. No rigidity.   Lymphadenopathy:      Head:      Right side of head: No tonsillar adenopathy.      " Left side of head: No tonsillar adenopathy.      Cervical: No cervical adenopathy.   Skin:     General: Skin is warm.      Findings: No rash.   Neurological:      Mental Status: She is alert.   Psychiatric:         Behavior: Behavior is cooperative.       Results for orders placed or performed in visit on 08/07/23   POCT CoV-2, Flu A/B, RSV by PCR   Result Value Ref Range    SARS-CoV-2 by PCR Negative Negative, Invalid    Influenza virus A RNA Negative Negative, Invalid    Influenza virus B, PCR Negative Negative, Invalid    RSV, PCR Negative Negative, Invalid   POCT GROUP A STREP, PCR   Result Value Ref Range    POC Group A Strep, PCR Not Detected Not Detected, Invalid       Assessment/Plan:     Diagnosis and Associated Orders:     1. Fever, unspecified fever cause  - POCT CoV-2, Flu A/B, RSV by PCR  - POCT GROUP A STREP, PCR    2. Sore throat  - POCT GROUP A STREP, PCR    3. Viral URI    Other orders  - Acetaminophen (TYLENOL) 325 MG Cap        Comments/MDM:  Viral panel negative.  Strep PCR negative.  Results communicated to patient via MyChart.  Discussed pregnancy safe medications.  Suspect viral etiology.  Vital signs stable and reassuring.  Lungs clear to auscultation bilaterally.  Do not suspect bacterial pneumonia.  No indication for antibiotics today.  Remedies that are safe to use for cold related symptoms during pregnancy including warm salt water gargles, rest, honey in hot water, nasal saline spray, humidifier.  Pregnancy safe cold medications include Tylenol which is safe during the entire pregnancy as needed, chlorpheniramine or diphenhydramine, Mucinex, dextromethorphan are also safe throughout pregnancy.  Do not recommend use of ibuprofen, naproxen, Celebrex, aspirin, benzocaine, phenylephrine, nasal steroid sprays.   I personally reviewed prior external notes and test results pertinent to today's visit. Supportive care, natural history, differential diagnoses, and indications for immediate  follow-up discussed. Return to clinic or go to ED if symptoms worsen or persist.  Red flag symptoms discussed.  Patient/Parent/Guardian voices understanding. Follow-up with your primary care provider in 3-5 days.  All side effects of medication discussed including allergic response, GI upset, tendon injury, rash, sedation etc    Please note that this dictation was created using voice recognition software. I have made a reasonable attempt to correct obvious errors, but I expect that there are errors of grammar and possibly content that I did not discover before finalizing the note.    This note was electronically signed by Chyna Calloway PA-C

## 2024-02-07 ENCOUNTER — HOSPITAL ENCOUNTER (OUTPATIENT)
Dept: RADIOLOGY | Facility: MEDICAL CENTER | Age: 34
End: 2024-02-07
Attending: OBSTETRICS & GYNECOLOGY
Payer: COMMERCIAL

## 2024-02-07 DIAGNOSIS — Z34.80 ENCOUNTER FOR SUPERVISION OF OTHER NORMAL PREGNANCY, UNSPECIFIED TRIMESTER: ICD-10-CM

## 2024-02-07 PROCEDURE — 76816 OB US FOLLOW-UP PER FETUS: CPT

## 2024-02-29 ENCOUNTER — NON-PROVIDER VISIT (OUTPATIENT)
Dept: OBGYN | Facility: CLINIC | Age: 34
End: 2024-02-29
Payer: COMMERCIAL

## 2024-02-29 NOTE — PROGRESS NOTES
Subjective:     Karyn Dalton is a 33 y.o. female here for lactation care.     Concerns: Prenatal with general questions    HPI:   Pertinent  history: , induction scheduled for 2024 @ 39.1 weeks    Mother does not have a history of advanced maternal age, GDM, hypertension prior to pregnancy, GHTN, insulin resistance, multiple gestation, PCOS, thyroid disease, auto immune disease , placenta encapsulation, and breast surgery    Breast changes in pregnancy: Yes  Breast surgeries: No    FEEDING HISTORY:    Previous Breastfeeding History: First baby.   Hospital Course: Planning to deliver at [facility].       Breast Pumping:  Not Pumping   Type of Pump: Spectra    Maternal ROS:  Constitutional: No fever, chills. Feeling well  Breasts: No soreness of breasts and No soreness of nipples  Psychiatric: Managing ok  Mental Health: No mention of feeling irritable, agitated, angry, overwhelmed, apathetic, appetite changes, exhausted nor having sleep changes outside infant feeds/demands.  Current Outpatient Medications on File Prior to Visit   Medication Sig Dispense Refill    Acetaminophen (TYLENOL) 325 MG Cap       chlorpheniramine (CHLOR-TRIMETRON) 4 MG Tab Take 4 mg by mouth every 6 hours as needed for Allergies.      sertraline (ZOLOFT) 25 MG tablet Take 1 Tablet by mouth every day. 60 Tablet 1    Prenatal Vit-Fe Fumarate-FA (PRENATAL PO) Take  by mouth.      albuterol 108 (90 Base) MCG/ACT Aero Soln inhalation aerosol Inhale 2 Puffs every four hours as needed for Shortness of Breath. 1 Each 6     No current facility-administered medications on file prior to visit.      Past Medical History:   Diagnosis Date    Anemia     Anxiety     Anxiety about health 2023    Asthma     Breast lump 2022    Migraine     Physiological ovarian cysts     Tinea corporis 2022       Objective:     Maternal Physical Lactation Exam  General: No acute distress  Breasts: Symmetrical  and Soft  Nipples:  intact, right side mildly inverted  Psychiatric: Normal mood and affect. Her behavior is normal. Judgment and thought content normal.  Mental Health: Did NOT exhibit sadness, crying, feeling overwhelmed, agitation or hypervigilance.    Lactation Counseling:       MATERNAL PERSONALIZED BREASTFEEDING PLAN  (Babies and parents change and adapt  or mal-adapt, to each other, so a plan today is only as good as it facilitates the families goals, follow up is key in a dynamic process as breastfeeding.)  Discussed concerns and symptoms as listed above in assessment and guidance summarized below. Shared decision making was used between myself and the family for this encounter, home care, and follow up. Topics advised, taught and or reviewed included:   4th Trimester: The 12-week period immediately after mom has had the baby. Not everyone has heard of it, but every mother and their  baby will go through it. It is a time of great physical and emotional change as the baby adjusts to being outside the womb, and the family adjusts to new life as parents  During the fourth trimester, one can expect fussiness and crying from the baby and very likely exhaustion for the family. [place] babies are learning to adjust to life outside the womb where it was warm and squishy!  There is a lot of misinformation about babies and their needs, and parents are often encouraged to ignore baby's signals. Bad idea. Babies are “half-baked” at birth and have much to learn with the help of physical and emotional support from caregivers. Taking care of a baby's needs is an investment that pays off with a happier, healthier child and adult.  It can take weeks or even months for your body to feel totally normal again. There is a major hormonal upheaval experienced by moms in the first few weeks after birth, because their bodies are shifting from many pregnancy hormones to a more normal hormonal make-up.  These first three months with baby earth  "side is a delicate time. Honor it with a mindful dose of support. Mindful Mamma's is an gerardo that may help.   Self-Compassion through Relational Support and Interaction.   Be kind to ourself. This is the first step in reducing anxiety and depression. Pay attention to how you are doing.   What do you need? Food, Rest, Sleep, Support, to Laugh/giggle: who will you ask today? They want to help you. We want to help you.   How do you stop your self-blame, or your self-criticism?   Get out of the house each day, walk or drive somewhere or ask a friend to drive you,  a \"treat\" at a drive through.   Mood and Anxiety Disorders: Having a new baby can be joyful time for some new moms, but a difficult time for others. For all, it is a time of profound physical and emotional change. Balancing baby, family, partners and friends, work, pets, and preexisting or new life stressors as well as sleep deprivation can be extremely challenging. Untreated depression, sleep exhaustion, and anxiety are each a challenge that must be addressed and in addition can contribute to early cessation of breastfeeding. It is important both for the mental health and physical health of new moms and babies to get on the path to feeling better and if therapeutic support is needed, specific resources are available.   Milk Supply is dependent on glandular tissue development, hormonal influences, how many times the baby removes milk and how well the breasts are emptied in a 24 hour period. This is a biological reality that we can NOT work around. If, for any reason, your baby is not latching, or you are not able to nurse, then it is important for you to remove the milk instead by pumping or hand expression.  There's no magic trick, tea, food, drink, cookie or supplement that will increase your milk supply. One  must  effectively remove milk to continue to make and maximize milk. In the early days and weeks that can be 8+ times in 24 hours. For " older babies, on average 6-7 + times in 24 hours.    Hydration: Staying hydrated is important however lack of hydration is usually not a cause of significantly low milk production.  Everyone needs a different amount of water, depending on their activity and diet. A high salt and/or high-protein diet, high physical activity, or very warm weather/sweating will require more fluids. A person eating a diet high in veggies and fruit, with a lack of physical activity will require fewer fluids. There is no magic number for the # of ounces of water each day.The best way to know that you are well hydrated is by looking at your urine.  Urine should look clear to light pale yellow, and you should need to pee at least every 3 to 4 hours unless you have a large bladder capacity.  Herbs and Medications: A galactagogue is an herb or medication taken by a breastfeeding mother to increase her milk supply. We know that for centuries mothers around the world have sought out remedies to increase their milk supply. However, there is limited research on the safety and effectiveness of herbal galactagogues, which makes it hard for us to endorse them. It is not known if any of these herbs are truly effective, and it is difficult to predict how a specific herbal galactagogue will affect an individual, requiring “trial and error” in many situations. When effective, results are generally seen within 24-72 hours of starting an herbal galactagogue. Many of these herbs are used to decrease high blood sugar. If you are diabetic or have problems with low blood sugar, or thyroid disease you may not be a candidate for herbs. Not all women can increase their low milk supply with a galactagogue due to the many underlying causes of low milk production.  When taking a galactagogue, remember that frequent milk removal is still the most effective way to increase supply.   Pumping Guidelines:  To be determined  Type of Pump:  Spectra  Spectra  Settings  Press letdown button when first starting         Cycle: 70 / Vacuum: L1 - L 5 (To comfort)  Once milk lets down, press letdown button again  Cycle: 54 / Vacuum: L1 - L12 (To comfort)  Caution with Breast Massage: The word “Massage” means different things in the breastfeeding world. It is described and interpreted in so many different ways and unfortunately potentially harmful. The hands can be safe on a breast and  gentle to help in many ways but they also can be damaging when used in the wrong way.  Lymphatic drainage massage is appropriate,  open hand , lightly stroking only, and can be highly effective. The massage advice to knead , massage deeply , vibrate with marketed tools is  most concerning. Be gentle with this gland to not increase inflammation.   Storage (Acceptable guidelines for healthy term babies)  10 hours at room temp including your pieces, may rinse but not mandatory  8 days refrigerator, don't need  to refrigerate right away if using fresh pumped milk at the next feeding  Freezer 1 year  Deep freezer 2 years  American Academy or Pediatrics has said you may mix different temperature milks.   If baby drinks breast milk from a fresh or refrigerated bottle of breast milk,  you may return the unused portion to the refrigerator and use once at next feeding.   Breast Care: Engorgement    Breast rest - No Massage, don't overfeed of over pump, down regulate production if needed  Advil 800mg every 8 hours for 48 hours with food  Ice - 10 minutes  after feeding then every 30 minutes or as desired for repeating the ice. Don't put the ice directly on the skin.  May add Tylenol 1000mg every 8 hours for 48 hours in between the Advil dosing if needed for pain.  Answers to FAQs: https://www.infantrisk.com/category/breastfeeding  Alcohol & Breastfeeding: What's your time-to-zero?  Cough & Cold Medications while Breastfeeding  Vitamin D Supplementation and Breastfeeding  Antidepressant Use While  Breastfeeding: What should I know?  Breastfeeding, Caffeine, and Energy Drinks  Recommended resources:  Physicians guide to breastfeeding, must up to date and accurate information available on breastfeeding. https://physicianguidetobreastfeeding.org/  Dads  Step #1 (for Partners): If possible, arrange your life so you can engage with your baby early and often                                                                                 Step #2 (for Moms): Encourage your partner to be hands-on - and let him handle things differently.                                                                                            Step #3 (for Partners): Realize that “your way” of parenting is essential for your child. https://doi.org/10.1093/cercor/iolj540    Connect with other mothers:  Facebook:   [place] Breastfeeds: https://[email or web address].breastfeeds/  Well-Nourished Babies (Private group for questions and support): https://www.LiveStub.com/groups/180636326393278/  Breastfeeding [place] including opportunity to weight your baby and do before and after weights   Tuesdays 10am - 11am. Women[facility] at [address] [place] [address], [address] [place] [address], 3rd floor conference room  Check your baby's weight, do a feeding and see how your baby is growing, visit with other mothers, plan on a walk or coffee date after group.  Please download Growth: Baby and Child for Apple or Child Growth Tracker for [employer] if you would like to  document and follow your baby's growth curve.  Due to space limitations - limit strollers please (New c/section moms please use your stroller).  We would love to have dads stay, but moms won't breastfeed if there are men in the room, sorry.  The room is generally scheduled for another event following group.  Please take all diapers with you   PSI ONLINE SUPPORT GROUPS  [employer] (PSI) support groups are conducted using a ewcw-wc-trkn support model and are not intended for those experiencing  a mental health crisis. Groups are 90 minutes (1.5 hours) in length. The first ~30 minutes is providing information, education, and establishing group guidelines. The next ~60 minutes is “talk time,” in which group members share and talk with each other. Group members must be present for the group guidelines before joining in the discussion or “talk time.”       Follow up   Please call [phone] our voicemail line or the front office at [phone] to scheduled your next appointment.      A total of 30 minutes was spent preparing to see the patient, obtaining and reviewing separately obtained history, performing a medically appropriate examination and evaluation, counseling and educating the family, referring and communicating with other health care professionals, documenting clinical information in the electronic health record, independently interpreting weighted feeds and infant growth results, communicating these results to the family and care coordination as detailed in the above note.       Brenda Soto

## 2024-03-10 ENCOUNTER — OFFICE VISIT (OUTPATIENT)
Dept: URGENT CARE | Facility: CLINIC | Age: 34
End: 2024-03-10
Payer: COMMERCIAL

## 2024-03-10 ENCOUNTER — HOSPITAL ENCOUNTER (OUTPATIENT)
Facility: MEDICAL CENTER | Age: 34
End: 2024-03-10
Attending: FAMILY MEDICINE
Payer: COMMERCIAL

## 2024-03-10 VITALS
RESPIRATION RATE: 16 BRPM | WEIGHT: 187 LBS | HEART RATE: 83 BPM | DIASTOLIC BLOOD PRESSURE: 80 MMHG | SYSTOLIC BLOOD PRESSURE: 116 MMHG | OXYGEN SATURATION: 99 % | BODY MASS INDEX: 27.7 KG/M2 | TEMPERATURE: 97.6 F | HEIGHT: 69 IN

## 2024-03-10 LAB
AMBIGUOUS DTTM AMBI4: NORMAL
APPEARANCE UR: NORMAL
BILIRUB UR STRIP-MCNC: NORMAL MG/DL
CANDIDA DNA VAG QL PROBE+SIG AMP: NEGATIVE
COLOR UR AUTO: YELLOW
G VAGINALIS DNA VAG QL PROBE+SIG AMP: NEGATIVE
GLUCOSE UR STRIP.AUTO-MCNC: NORMAL MG/DL
KETONES UR STRIP.AUTO-MCNC: NORMAL MG/DL
LEUKOCYTE ESTERASE UR QL STRIP.AUTO: NORMAL
NITRITE UR QL STRIP.AUTO: NORMAL
PH UR STRIP.AUTO: 6.5 [PH] (ref 5–8)
PROT UR QL STRIP: NORMAL MG/DL
RBC UR QL AUTO: NORMAL
SP GR UR STRIP.AUTO: 1.01
T VAGINALIS DNA VAG QL PROBE+SIG AMP: NEGATIVE
UROBILINOGEN UR STRIP-MCNC: 0.2 MG/DL

## 2024-03-10 PROCEDURE — 99214 OFFICE O/P EST MOD 30 MIN: CPT | Performed by: FAMILY MEDICINE

## 2024-03-10 PROCEDURE — 87660 TRICHOMONAS VAGIN DIR PROBE: CPT

## 2024-03-10 PROCEDURE — 3079F DIAST BP 80-89 MM HG: CPT | Performed by: FAMILY MEDICINE

## 2024-03-10 PROCEDURE — 87480 CANDIDA DNA DIR PROBE: CPT

## 2024-03-10 PROCEDURE — 87510 GARDNER VAG DNA DIR PROBE: CPT

## 2024-03-10 PROCEDURE — 87086 URINE CULTURE/COLONY COUNT: CPT

## 2024-03-10 PROCEDURE — 3074F SYST BP LT 130 MM HG: CPT | Performed by: FAMILY MEDICINE

## 2024-03-10 PROCEDURE — 81002 URINALYSIS NONAUTO W/O SCOPE: CPT | Performed by: FAMILY MEDICINE

## 2024-03-10 RX ORDER — IBUPROFEN 800 MG/1
TABLET ORAL
COMMUNITY
Start: 2024-03-06

## 2024-03-10 RX ORDER — AMOXICILLIN AND CLAVULANATE POTASSIUM 875; 125 MG/1; MG/1
1 TABLET, FILM COATED ORAL 2 TIMES DAILY
Qty: 14 TABLET | Refills: 0 | Status: SHIPPED | OUTPATIENT
Start: 2024-03-10 | End: 2024-03-17

## 2024-03-10 ASSESSMENT — ENCOUNTER SYMPTOMS
FEVER: 0
ABDOMINAL PAIN: 0

## 2024-03-10 ASSESSMENT — FIBROSIS 4 INDEX: FIB4 SCORE: 0.58

## 2024-03-10 NOTE — ADDENDUM NOTE
Addended by: FELICE MARTINEZ on: 3/10/2024 12:22 PM     Modules accepted: Orders, Level of Service

## 2024-03-10 NOTE — PROGRESS NOTES
Subjective:     Karyn Dalton is a 33 y.o. female who presents for Vaginal Pain (Patient states feels does have a vaginal infection, is x 4 days post-partum, explains delivery very difficult, constipated is scared to go poop)    HPI  Pt presents for evaluation of an acute problem  Patient here for evaluation of vaginal pain  Patient is 4 days postpartum from vaginal delivery  Vaginal delivery was difficult and patient needed an episiotomy  No vaginal bleeding since the hospital discharge  Had some discharge postpartum, but mild and she feels this is likely normal  No abdominal pain  Does have a hemorrhoid and feels constipated  No fevers  Review of Systems   Constitutional:  Negative for fever.   Gastrointestinal:  Negative for abdominal pain.       PMH:  has a past medical history of Anemia, Anxiety, Anxiety about health (2/1/2023), Asthma, Breast lump (5/24/2022), Migraine, Physiological ovarian cysts, and Tinea corporis (7/5/2022).    She has no past medical history of Psychosis ([facility]), Seizure ([facility]), Self-injurious behavior, Suicide attempt ([facility]), or Type II or unspecified type diabetes mellitus without mention of complication, not stated as uncontrolled.  MEDS:   Current Outpatient Medications:     ibuprofen (MOTRIN) 800 MG Tab, , Disp: , Rfl:     amoxicillin-clavulanate (AUGMENTIN) 875-125 MG Tab, Take 1 Tablet by mouth 2 times a day for 7 days., Disp: 14 Tablet, Rfl: 0    Acetaminophen (TYLENOL) 325 MG Cap, , Disp: , Rfl:     Prenatal Vit-Fe Fumarate-FA (PRENATAL PO), Take  by mouth., Disp: , Rfl:   ALLERGIES: No Known Allergies  SURGHX:   Past Surgical History:   Procedure Laterality Date    OTHER      age 12 hyperhydrosis     SOCHX:  reports that she has never smoked. She has never used smokeless tobacco. She reports that she does not currently use alcohol after a past usage of about 8.4 oz of alcohol per week. She reports that she does not use drugs.     Objective:   /80 (BP Location: Left  "arm, Patient Position: Sitting, BP Cuff Size: Large adult)   Pulse 83   Temp 36.4 °C (97.6 °F)   Resp 16   Ht 1.753 m (5' 9\")   Wt 84.8 kg (187 lb)   SpO2 99%   BMI 27.62 kg/m²     Physical Exam  Exam conducted with a chaperone present.   Constitutional:       General: She is not in acute distress.     Appearance: She is well-developed. She is not diaphoretic.   Pulmonary:      Effort: Pulmonary effort is normal.   Abdominal:      General: Abdomen is flat. Bowel sounds are normal. There is no distension.      Palpations: Abdomen is soft.      Tenderness: There is no abdominal tenderness. There is no guarding or rebound.   Genitourinary:     Comments: Vaginal vault with no active bleed and no significant discharge, healing episiotomy with slight wound dehiscence at the most superior/inner aspect, mild swelling, and mild erythema.  Area is tender to palpation.  Area is soft, nonfluctuant, and with no drainable abscess appreciated.  Neurological:      Mental Status: She is alert.       Assessment/Plan:   Assessment    1. Infection of perineal wound following delivery  - amoxicillin-clavulanate (AUGMENTIN) 875-125 MG Tab; Take 1 Tablet by mouth 2 times a day for 7 days.  Dispense: 14 Tablet; Refill: 0    Other orders  - ibuprofen (MOTRIN) 800 MG Tab    Patient with an infection of perineal wound after delivery a few days ago.  Her suture repair is overall intact and appears to be healing, but she is developing some infection which is currently very mild.  Recommended starting antibiotics and having close follow-up with OB/GYN.  Her next follow-up with OB/GYN is not for 6 weeks and recommended that she call Monday morning to try to get a earlier appointment.  Given close follow-up/ER precautions if developing any abdominal pain, fever, or worsening of her current symptoms.  All questions answered and will follow-up in the urgent care as needed.    "

## 2024-03-12 LAB
BACTERIA UR CULT: NORMAL
SIGNIFICANT IND 70042: NORMAL
SITE SITE: NORMAL
SOURCE SOURCE: NORMAL

## 2024-08-12 ENCOUNTER — OFFICE VISIT (OUTPATIENT)
Dept: URGENT CARE | Facility: CLINIC | Age: 34
End: 2024-08-12
Payer: COMMERCIAL

## 2024-08-12 VITALS
HEART RATE: 79 BPM | RESPIRATION RATE: 20 BRPM | BODY MASS INDEX: 25.03 KG/M2 | DIASTOLIC BLOOD PRESSURE: 84 MMHG | OXYGEN SATURATION: 97 % | SYSTOLIC BLOOD PRESSURE: 126 MMHG | HEIGHT: 69 IN | WEIGHT: 169 LBS | TEMPERATURE: 99.6 F

## 2024-08-12 DIAGNOSIS — J02.9 ACUTE SORE THROAT: ICD-10-CM

## 2024-08-12 DIAGNOSIS — J03.90 ACUTE TONSILLITIS, UNSPECIFIED ETIOLOGY: Primary | ICD-10-CM

## 2024-08-12 DIAGNOSIS — M79.10 MYALGIA: ICD-10-CM

## 2024-08-12 PROCEDURE — 0241U POCT CEPHEID COV-2, FLU A/B, RSV - PCR: CPT | Performed by: NURSE PRACTITIONER

## 2024-08-12 PROCEDURE — 99213 OFFICE O/P EST LOW 20 MIN: CPT | Performed by: NURSE PRACTITIONER

## 2024-08-12 PROCEDURE — 87651 STREP A DNA AMP PROBE: CPT | Performed by: NURSE PRACTITIONER

## 2024-08-12 PROCEDURE — 3079F DIAST BP 80-89 MM HG: CPT | Performed by: NURSE PRACTITIONER

## 2024-08-12 PROCEDURE — 3074F SYST BP LT 130 MM HG: CPT | Performed by: NURSE PRACTITIONER

## 2024-08-12 RX ORDER — AMOXICILLIN 500 MG/1
500 CAPSULE ORAL 2 TIMES DAILY
Qty: 20 CAPSULE | Refills: 0 | Status: SHIPPED | OUTPATIENT
Start: 2024-08-12 | End: 2024-08-22

## 2024-08-12 ASSESSMENT — FIBROSIS 4 INDEX: FIB4 SCORE: 0.58

## 2024-08-12 NOTE — LETTER
August 12, 2024       Patient: Karyn Dalton   YOB: 1990   Date of Visit: 8/12/2024         To Whom It May Concern:    In my medical opinion, I recommend that Karyn Dalton return to full duty, no restrictions on 08/14/24. Please excuse her work absence            Sincerely,          Margaret Bauer, A.P.N.  Electronically Signed

## 2024-08-13 NOTE — PROGRESS NOTES
Karyn Dalton is a 33 y.o. female who presents for Pharyngitis (2 days.)      HPI  This is a new problem. Karyn Dalton is a 33 y.o. patient who presents to urgent care with c/o: 2 days ago felt sickly. + bodyaches, sore throat.  Did 2 covid tests at home - 1 was negative and 1 was not clear results.   Cleared her throat and got bloody streaked dark phlegm.   Tx tried: rotating tylenol and ibuprofen   + Breastfeeding     ROS See HPI    Allergies:     No Known Allergies    PMSFS Hx:  Past Medical History:   Diagnosis Date    Anemia     Anxiety     Anxiety about health 2/1/2023    Asthma     Breast lump 5/24/2022    Migraine     Physiological ovarian cysts     Tinea corporis 7/5/2022     Past Surgical History:   Procedure Laterality Date    OTHER      age 12 hyperhydrosis     Family History   Problem Relation Age of Onset    Anxiety disorder Mother     Alcohol abuse Sister     Anxiety disorder Sister     Alcohol abuse Maternal Aunt     Cancer Maternal Uncle         liver, throat, lung    Alcohol abuse Maternal Uncle     Heart Disease Paternal Uncle     Heart Disease Paternal Uncle     Breast Cancer Maternal Grandmother     Heart Disease Maternal Grandmother     Cancer Maternal Grandmother         breast    Alzheimer's Disease Maternal Grandmother     Alzheimer's Disease Maternal Grandfather     Heart Disease Maternal Grandfather     Alcohol abuse Maternal Grandfather     Heart Disease Paternal Grandmother     Cancer Paternal Grandmother         throat, tobacco use    Heart Attack Paternal Grandmother     Heart Attack Paternal Grandfather     Heart Disease Paternal Grandfather     Hypertension Paternal Grandfather     Diabetes Paternal Grandfather     Suicide Attempts Neg Hx     Schizophrenia Neg Hx     Bipolar disorder Neg Hx      Social History     Tobacco Use    Smoking status: Never    Smokeless tobacco: Never   Substance Use Topics    Alcohol use: Not Currently     Alcohol/week: 8.4 oz     Types: 4  "Glasses of wine, 10 Cans of beer per week     Comment: Want to cut down.       Problems:   Patient Active Problem List   Diagnosis    DAVID (generalized anxiety disorder)    Alcohol abuse    Mild intermittent asthma without complication    Exposure to nickel dust    Vitamin D deficiency    Seasonal allergies    Elevated LDL cholesterol level    Change in mole    Alcohol cessation counseling    Blood in the stool       Medications:   Current Outpatient Medications on File Prior to Visit   Medication Sig Dispense Refill    ibuprofen (MOTRIN) 800 MG Tab       Acetaminophen (TYLENOL) 325 MG Cap       Prenatal Vit-Fe Fumarate-FA (PRENATAL PO) Take  by mouth.       No current facility-administered medications on file prior to visit.        Objective:     /84   Pulse 79   Temp 37.6 °C (99.6 °F) (Temporal)   Resp 20   Ht 1.753 m (5' 9\")   Wt 76.7 kg (169 lb)   SpO2 97%   BMI 24.96 kg/m²     Physical Exam  Vitals and nursing note reviewed.   Constitutional:       General: She is not in acute distress.     Appearance: Normal appearance. She is well-developed. She is not toxic-appearing.   HENT:      Head: Normocephalic.      Right Ear: Hearing, tympanic membrane, ear canal and external ear normal.      Left Ear: Hearing, tympanic membrane, ear canal and external ear normal.      Nose: Nose normal.      Right Sinus: No frontal sinus tenderness.      Left Sinus: No frontal sinus tenderness.      Mouth/Throat:      Lips: Pink.      Mouth: Mucous membranes are moist.      Pharynx: Uvula midline. Posterior oropharyngeal erythema present.      Tonsils: Tonsillar exudate present. 2+ on the right. 2+ on the left.   Eyes:      General: Lids are normal.      Pupils: Pupils are equal, round, and reactive to light.   Neck:      Trachea: Trachea and phonation normal.   Cardiovascular:      Rate and Rhythm: Normal rate and regular rhythm.      Pulses: Normal pulses.      Heart sounds: Normal heart sounds.   Pulmonary:      " Effort: Pulmonary effort is normal.      Breath sounds: Normal breath sounds.   Musculoskeletal:         General: Normal range of motion.      Cervical back: Full passive range of motion without pain, normal range of motion and neck supple.   Lymphadenopathy:      Head:      Right side of head: Tonsillar adenopathy present.      Left side of head: Tonsillar adenopathy present.      Cervical: No cervical adenopathy.      Upper Body:      Right upper body: No supraclavicular adenopathy.      Left upper body: No supraclavicular adenopathy.   Skin:     General: Skin is warm and dry.      Capillary Refill: Capillary refill takes less than 2 seconds.      Findings: No rash.   Neurological:      Mental Status: She is alert and oriented to person, place, and time.      Deep Tendon Reflexes: Reflexes are normal and symmetric.   Psychiatric:         Speech: Speech normal.         Behavior: Behavior normal.         Assessment /Associated Orders:      1. Acute tonsillitis, unspecified etiology  amoxicillin (AMOXIL) 500 MG Cap      2. Acute sore throat  POCT CEPHEID GROUP A STREP - PCR    POCT CoV-2, Flu A/B, RSV by PCR      3. Myalgia  POCT CEPHEID GROUP A STREP - PCR    POCT CoV-2, Flu A/B, RSV by PCR            Medical Decision Making:    Karyn Pineda is a very pleasant 33 y.o. female who is clinically stable at today's acute urgent care visit.  No acute distress noted.  VSS. Appropriate for outpatient care at this time.   Acute problem today with uncertain prognosis.   Educated in proper administration of  prescription medication(s) ordered today including safety, possible SE, risks, benefits, rationale and alternatives to therapy.   Salt water gargles BID and prn. Suggested 1/4 to 1/2 teaspoon (1.5 to 3.0 g) of salt per one cup (8 ounces or 250 mL) of warm water.   OTC throat analgesic spray or lozenge of choice prn throat pain. Dosage and directions per   OTC  analgesic of choice (acetaminophen or NSAID) prn pain.  Follow manufactures dosing and safety precautions.   Stay well hydrated.   Educated in infection control practices.     Discussed Dx, management options (risks,benefits, and alternatives to planned treatment), natural progression and supportive care.  Expressed understanding and the treatment plan was agreed upon.   Questions were encouraged and answered   Return to urgent care prn if new or worsening sx or if there is no improvement in condition prn.    Educated in Red flags and indications to immediately call 911 or present to the Emergency Department.         Please note that this dictation was created using voice recognition software. I have worked with consultants from the vendor as well as technical experts from [facility] at the [facility] to optimize the interface. I have made every reasonable attempt to correct obvious errors, but I expect that there are errors of grammar and possibly content that I did not discover before finalizing the note.  This note was electronically signed by provider

## 2024-08-30 ENCOUNTER — OFFICE VISIT (OUTPATIENT)
Dept: URGENT CARE | Facility: CLINIC | Age: 34
End: 2024-08-30
Payer: COMMERCIAL

## 2024-08-30 VITALS
RESPIRATION RATE: 18 BRPM | OXYGEN SATURATION: 97 % | SYSTOLIC BLOOD PRESSURE: 112 MMHG | HEIGHT: 69 IN | HEART RATE: 85 BPM | WEIGHT: 169 LBS | BODY MASS INDEX: 25.03 KG/M2 | DIASTOLIC BLOOD PRESSURE: 68 MMHG | TEMPERATURE: 98.3 F

## 2024-08-30 DIAGNOSIS — R05.1 ACUTE COUGH: ICD-10-CM

## 2024-08-30 DIAGNOSIS — J45.901 EXACERBATION OF ASTHMA, UNSPECIFIED ASTHMA SEVERITY, UNSPECIFIED WHETHER PERSISTENT: ICD-10-CM

## 2024-08-30 RX ORDER — FAMOTIDINE 10 MG
TABLET ORAL
COMMUNITY

## 2024-08-30 RX ORDER — PREDNISONE 10 MG/1
20 TABLET ORAL DAILY
Qty: 10 TABLET | Refills: 0 | Status: SHIPPED | OUTPATIENT
Start: 2024-08-30 | End: 2024-09-04

## 2024-08-30 RX ORDER — BENZONATATE 100 MG/1
100 CAPSULE ORAL 3 TIMES DAILY PRN
Qty: 30 CAPSULE | Refills: 0 | Status: SHIPPED | OUTPATIENT
Start: 2024-08-30 | End: 2024-09-09

## 2024-08-30 RX ORDER — ALBUTEROL SULFATE 90 UG/1
2 AEROSOL, METERED RESPIRATORY (INHALATION) EVERY 6 HOURS PRN
Qty: 8.5 G | Refills: 0 | Status: SHIPPED | OUTPATIENT
Start: 2024-08-30

## 2024-08-30 ASSESSMENT — ENCOUNTER SYMPTOMS
SHORTNESS OF BREATH: 0
DIZZINESS: 0
ABDOMINAL PAIN: 0
FEVER: 0

## 2024-08-30 ASSESSMENT — FIBROSIS 4 INDEX: FIB4 SCORE: 0.58

## 2024-08-31 NOTE — PROGRESS NOTES
Subjective:   Karyn Dalton is a 33 y.o. female who presents for Cough (X3 days, deep cough, chest congestion and wheezing. )      HPI  3 days of cough that is productive with mucous, has been wheezing. Confirmed exposures to child who has similar symptoms. Using ibuprofen for the symptoms. Has used albuterol in the past, hx of asthma.  Tolerating PO. Is currently breast feeding.  Just finished round of amoxicillin for tonsillitis.  No recent hospitalizations.    Review of Systems   Constitutional:  Negative for fever.   Respiratory:  Negative for shortness of breath.    Cardiovascular:  Negative for chest pain.   Gastrointestinal:  Negative for abdominal pain.   Skin:  Negative for rash.   Neurological:  Negative for dizziness.       No Known Allergies    Patient Active Problem List    Diagnosis Date Noted    Blood in the stool 06/02/2023    Alcohol cessation counseling 09/22/2022    Change in mole 07/05/2022    Elevated LDL cholesterol level 06/07/2022    Seasonal allergies 05/24/2022    Vitamin D deficiency 10/07/2021    Exposure to nickel dust 04/05/2021    Mild intermittent asthma without complication 02/05/2019    DAVID (generalized anxiety disorder) 01/11/2018    Alcohol abuse 01/11/2018       Current Outpatient Medications Ordered in Epic   Medication Sig Dispense Refill    benzonatate (TESSALON) 100 MG Cap Take 1 Capsule by mouth 3 times a day as needed for Cough for up to 10 days. 30 Capsule 0    albuterol 108 (90 Base) MCG/ACT Aero Soln inhalation aerosol Inhale 2 Puffs every 6 hours as needed for Shortness of Breath. 8.5 g 0    Prenatal Vit-Fe Fumarate-FA (PRENATAL PO) Take  by mouth.      famotidine (PEPCID) 10 MG tablet  (Patient not taking: Reported on 8/30/2024)      ibuprofen (MOTRIN) 800 MG Tab  (Patient not taking: Reported on 8/30/2024)      Acetaminophen (TYLENOL) 325 MG Cap  (Patient not taking: Reported on 8/30/2024)       No current [facility]-ordered facility-administered medications on  "file.       Past Surgical History:   Procedure Laterality Date    OTHER      age 12 hyperhydrosis       Social History     Tobacco Use    Smoking status: Never    Smokeless tobacco: Never   Vaping Use    Vaping status: Never Used   Substance Use Topics    Alcohol use: Not Currently     Alcohol/week: 8.4 oz     Types: 4 Glasses of wine, 10 Cans of beer per week     Comment: Want to cut down.    Drug use: No       family history includes Alcohol abuse in her maternal aunt, maternal grandfather, maternal uncle, and sister; Alzheimer's Disease in her maternal grandfather and maternal grandmother; Anxiety disorder in her mother and sister; Breast Cancer in her maternal grandmother; Cancer in her maternal grandmother, maternal uncle, and paternal grandmother; Diabetes in her paternal grandfather; Heart Attack in her paternal grandfather and paternal grandmother; Heart Disease in her maternal grandfather, maternal grandmother, paternal grandfather, paternal grandmother, paternal uncle, and paternal uncle; Hypertension in her paternal grandfather.     Problem list, medications, and allergies reviewed by myself today in Epic.     Objective:   /68   Pulse 85   Temp 36.8 °C (98.3 °F) (Temporal)   Resp 18   Ht 1.753 m (5' 9\")   Wt 76.7 kg (169 lb)   LMP  (LMP Unknown)   SpO2 97%   Breastfeeding Yes   BMI 24.96 kg/m²     Physical Exam  Vitals and nursing note reviewed.   Constitutional:       General: She is not in acute distress.     Appearance: Normal appearance. She is well-developed. She is ill-appearing. She is not toxic-appearing or diaphoretic.   HENT:      Head: Normocephalic and atraumatic.      Right Ear: Tympanic membrane, ear canal and external ear normal.      Left Ear: Tympanic membrane, ear canal and external ear normal.      Nose: Congestion and rhinorrhea present.      Mouth/Throat:      Mouth: Mucous membranes are moist.      Pharynx: No oropharyngeal exudate or posterior oropharyngeal erythema. "   Eyes:      General:         Right eye: No discharge.         Left eye: No discharge.      Conjunctiva/sclera: Conjunctivae normal.   Cardiovascular:      Rate and Rhythm: Normal rate and regular rhythm.      Pulses: Normal pulses.      Heart sounds: Normal heart sounds.   Pulmonary:      Effort: Pulmonary effort is normal. No respiratory distress.      Breath sounds: Wheezing (Scattered expiratory) present. No rhonchi or rales.   Musculoskeletal:      Cervical back: Normal range of motion and neck supple.      Right lower leg: No edema.      Left lower leg: No edema.   Lymphadenopathy:      Cervical: No cervical adenopathy.   Skin:     General: Skin is warm and dry.   Neurological:      General: No focal deficit present.      Mental Status: She is alert and oriented to person, place, and time. Mental status is at baseline.   Psychiatric:         Mood and Affect: Mood normal.         Behavior: Behavior normal.         Thought Content: Thought content normal.         Judgment: Judgment normal.         Assessment/Plan:     I personally reviewed prior external notes and test results pertinent to today's visit as well as additional imaging and testing completed in clinic today.    I introduced myself as Dexter Pittman a Nurse Practitioner.    1. Exacerbation of asthma, unspecified asthma severity, unspecified whether persistent  albuterol 108 (90 Base) MCG/ACT Aero Soln inhalation aerosol      2. Acute cough  benzonatate (TESSALON) 100 MG Cap          Very pleasant 33-year-old with 3 days of worsening cough also some wheezing.  Does have history of asthma but does not have albuterol inhaler.  She is breast-feeding.  Thankfully her vitals are reassuring with no tachycardia and no hypoxia.  Does appear nontoxic, talking in full sentences.  Congestion and rhinorrhea noted I also noted scattered expiratory wheezes on auscultation.  Remainder of exam unremarkable.  Given the exam think it is likely asthma exacerbation  secondary to viral etiology.  Discussed continuing with lactation safe OTC medications.  Will add albuterol, prednisone and benzonatate discussed pumping and dumping.  Pulmonary toilet.  Increase fluids.  Ambulation as tolerated. Medication discussed included indication for use and the potential benefits and side effects. The Patient was encouraged to monitor symptoms closely, and we reviewed the signs and symptoms that require a higher level of care through the emergency department. Patient verbalized understanding.    Please note that this dictation was created using voice recognition software. I have made every reasonable attempt to correct obvious errors, but I expect that there are errors of grammar and possibly content that I did not discover before finalizing the note.    This note was electronically signed by RUDOLPH Engle

## 2024-09-24 ENCOUNTER — APPOINTMENT (RX ONLY)
Dept: URBAN - METROPOLITAN AREA CLINIC 4 | Facility: CLINIC | Age: 34
Setting detail: DERMATOLOGY
End: 2024-09-24

## 2024-09-24 DIAGNOSIS — Z71.89 OTHER SPECIFIED COUNSELING: ICD-10-CM

## 2024-09-24 DIAGNOSIS — L21.8 OTHER SEBORRHEIC DERMATITIS: ICD-10-CM

## 2024-09-24 PROCEDURE — ? SUNSCREEN RECOMMENDATIONS

## 2024-09-24 PROCEDURE — 99213 OFFICE O/P EST LOW 20 MIN: CPT

## 2024-09-24 PROCEDURE — ? COUNSELING

## 2024-09-24 ASSESSMENT — LOCATION DETAILED DESCRIPTION DERM
LOCATION DETAILED: POSTERIOR MID-PARIETAL SCALP
LOCATION DETAILED: LEFT ULNAR DORSAL HAND
LOCATION DETAILED: RIGHT RADIAL DORSAL HAND

## 2024-09-24 ASSESSMENT — LOCATION ZONE DERM
LOCATION ZONE: HAND
LOCATION ZONE: SCALP

## 2024-09-24 ASSESSMENT — LOCATION SIMPLE DESCRIPTION DERM
LOCATION SIMPLE: POSTERIOR SCALP
LOCATION SIMPLE: RIGHT HAND
LOCATION SIMPLE: LEFT HAND

## 2024-12-01 ENCOUNTER — OFFICE VISIT (OUTPATIENT)
Dept: URGENT CARE | Facility: CLINIC | Age: 34
End: 2024-12-01
Payer: COMMERCIAL

## 2024-12-01 VITALS
RESPIRATION RATE: 18 BRPM | OXYGEN SATURATION: 99 % | BODY MASS INDEX: 23.7 KG/M2 | HEART RATE: 86 BPM | SYSTOLIC BLOOD PRESSURE: 126 MMHG | DIASTOLIC BLOOD PRESSURE: 74 MMHG | HEIGHT: 69 IN | TEMPERATURE: 99 F | WEIGHT: 160 LBS

## 2024-12-01 DIAGNOSIS — R50.9 FEVER, UNSPECIFIED FEVER CAUSE: ICD-10-CM

## 2024-12-01 DIAGNOSIS — J06.9 VIRAL UPPER RESPIRATORY TRACT INFECTION: ICD-10-CM

## 2024-12-01 LAB
APPEARANCE UR: CLEAR
BILIRUB UR STRIP-MCNC: NEGATIVE MG/DL
COLOR UR AUTO: YELLOW
FLUAV RNA SPEC QL NAA+PROBE: NEGATIVE
FLUBV RNA SPEC QL NAA+PROBE: NEGATIVE
GLUCOSE UR STRIP.AUTO-MCNC: NEGATIVE MG/DL
KETONES UR STRIP.AUTO-MCNC: 15 MG/DL
LEUKOCYTE ESTERASE UR QL STRIP.AUTO: NEGATIVE
NITRITE UR QL STRIP.AUTO: NEGATIVE
PH UR STRIP.AUTO: 6 [PH] (ref 5–8)
POCT INT CON NEG: NEGATIVE
POCT INT CON POS: POSITIVE
POCT URINE PREGNANCY TEST: NEGATIVE
PROT UR QL STRIP: NEGATIVE MG/DL
RBC UR QL AUTO: NEGATIVE
RSV RNA SPEC QL NAA+PROBE: NEGATIVE
SARS-COV-2 RNA RESP QL NAA+PROBE: NEGATIVE
SP GR UR STRIP.AUTO: 1
UROBILINOGEN UR STRIP-MCNC: 0.2 MG/DL

## 2024-12-01 PROCEDURE — 81025 URINE PREGNANCY TEST: CPT

## 2024-12-01 PROCEDURE — 3074F SYST BP LT 130 MM HG: CPT

## 2024-12-01 PROCEDURE — 99214 OFFICE O/P EST MOD 30 MIN: CPT

## 2024-12-01 PROCEDURE — 81002 URINALYSIS NONAUTO W/O SCOPE: CPT

## 2024-12-01 PROCEDURE — 3078F DIAST BP <80 MM HG: CPT

## 2024-12-01 PROCEDURE — 0241U POCT CEPHEID COV-2, FLU A/B, RSV - PCR: CPT

## 2024-12-01 ASSESSMENT — VISUAL ACUITY: OU: 1

## 2024-12-01 ASSESSMENT — FIBROSIS 4 INDEX: FIB4 SCORE: 0.6

## 2024-12-02 NOTE — PROGRESS NOTES
Verbal consent was acquired by the patient to use [employer] ambient listening note generation during this visit     Date: 12/01/24        Chief Complaint   Patient presents with    Fever     X2 days, Chills, body ache, fatigue           History of Present Illness  The patient is a 34-year-old female who presents to urgent care for evaluation of flu-like symptoms.    She has been experiencing these symptoms for the past 24 hours, which began abruptly yesterday afternoon. She recalls feeling unwell after a drive to visit friends, during which she felt discomfort in her back and neck. She reports a worsening cough, congestion, and runny nose. She has also been experiencing cold sweats and a slight fever, with the highest recorded temperature being 100.5 degrees. She has been managing her symptoms with ibuprofen, taking three doses of 800 mg, which has provided some relief.    She is currently breastfeeding her 9-month-old son and has noticed him becoming more irritable. She has also been experiencing intermittent pain in her left nipple for the past week or two, which she attributes to mastitis. She reports no abnormal nipple discharge.    She has had similar episodes twice before, with the first instance coinciding with mastitis. After a chiropractic visit, she felt extremely ill, experiencing flu-like symptoms, body aches, chills, and a slight fever. This current episode is the most severe she has experienced.    She reports no ear pain, sore throat, nausea, vomiting, or diarrhea. She also mentions a recent bladder infection, which she managed by increasing her fluid intake.         ROS:    No severe shortness of breath   No Cardiac like chest pain, as discussed   As otherwise stated in HPI    Medical/SX/ Social History:  Reviewed per chart    Pertinent Medications:    Current Outpatient Medications on File Prior to Visit   Medication Sig Dispense Refill    albuterol 108 (90 Base) MCG/ACT Aero Soln inhalation  aerosol Inhale 2 Puffs every 6 hours as needed for Shortness of Breath. 8.5 g 0    Prenatal Vit-Fe Fumarate-FA (PRENATAL PO) Take  by mouth.      famotidine (PEPCID) 10 MG tablet  (Patient not taking: Reported on 12/1/2024)      ibuprofen (MOTRIN) 800 MG Tab  (Patient not taking: Reported on 12/1/2024)      Acetaminophen (TYLENOL) 325 MG Cap  (Patient not taking: Reported on 12/1/2024)       No current facility-administered medications on file prior to visit.        Allergies:    Patient has no known allergies.     Problem list, medications, and allergies reviewed by myself today in Epic     Physical Exam:    Vitals:    12/01/24 1513   BP: 126/74   Pulse: 86   Resp: 18   Temp: 37.2 °C (99 °F)   SpO2: 99%             Physical Exam  Vitals reviewed.   Constitutional:       General: She is not in acute distress.     Appearance: Normal appearance. She is normal weight. She is not ill-appearing or toxic-appearing.   HENT:      Head: Normocephalic.      Right Ear: Tympanic membrane, ear canal and external ear normal.      Left Ear: Tympanic membrane, ear canal and external ear normal.      Nose: No congestion or rhinorrhea.      Mouth/Throat:      Mouth: Mucous membranes are moist.      Dentition: Normal dentition.      Tongue: No lesions. Tongue does not deviate from midline.      Palate: No mass and lesions.      Pharynx: Postnasal drip present. No pharyngeal swelling or uvula swelling.      Tonsils: No tonsillar exudate or tonsillar abscesses.   Eyes:      General: Lids are normal. Lids are everted, no foreign bodies appreciated. Vision grossly intact. Gaze aligned appropriately.      Extraocular Movements: Extraocular movements intact.      Conjunctiva/sclera:      Right eye: Right conjunctiva is injected. No chemosis, exudate or hemorrhage.     Left eye: Left conjunctiva is injected. No chemosis, exudate or hemorrhage.     Pupils: Pupils are equal, round, and reactive to light.   Cardiovascular:      Rate and Rhythm:  Normal rate and regular rhythm.      Pulses: Normal pulses.      Heart sounds: Normal heart sounds. Heart sounds not distant. No murmur heard.     No gallop.   Pulmonary:      Effort: Pulmonary effort is normal.      Breath sounds: Normal breath sounds.   Chest:   Breasts:     Right: Normal. No swelling or skin change.      Left: Normal. No swelling or skin change.   Abdominal:      General: Abdomen is flat.      Palpations: Abdomen is soft.      Tenderness: There is no abdominal tenderness. There is no guarding.   Musculoskeletal:         General: Normal range of motion.      Cervical back: Full passive range of motion without pain, normal range of motion and neck supple.      Right lower leg: No edema.      Left lower leg: No edema.   Skin:     General: Skin is warm.   Neurological:      General: No focal deficit present.      Mental Status: She is alert.   Psychiatric:         Mood and Affect: Mood normal.         Behavior: Behavior normal.         Thought Content: Thought content normal.            Diagnostics:    Results for orders placed or performed in visit on 12/01/24   POCT Urinalysis    Collection Time: 12/01/24  3:53 PM   Result Value Ref Range    POC Color Yellow Negative    POC Appearance Clear Negative    POC Glucose Negative Negative mg/dL    POC Bilirubin Negative Negative mg/dL    POC Ketones 15 Negative mg/dL    POC Specific Gravity 1.005 <1.005 - >1.030    POC Blood Negative Negative    POC Urine PH 6.0 5.0 - 8.0    POC Protein Negative Negative mg/dL    POC Urobiligen 0.2 Negative (0.2) mg/dL    POC Nitrites Negative Negative    POC Leukocyte Esterase Negative Negative   POCT Pregnancy    Collection Time: 12/01/24  3:55 PM   Result Value Ref Range    POC Urine Pregnancy Test Negative     Internal Control Positive Positive     Internal Control Negative Negative    POCT CoV-2, Flu A/B, RSV by PCR    Collection Time: 12/01/24  5:29 PM   Result Value Ref Range    SARS-CoV-2 by PCR Negative Negative,  Invalid    Influenza virus A RNA Negative Negative, Invalid    Influenza virus B, PCR Negative Negative, Invalid    RSV, PCR Negative Negative, Invalid        Medical Decision making and plan :  I personally reviewed prior external notes and test results pertinent to today's visit. Pt is clinically stable at today's acute urgent care visit.  Patient appears nontoxic with no acute distress noted. Appropriate for outpatient care at this time.    Pleasant 34 y.o. female presented clinic with:     Assessment & Plan  1. Influenza-like illness.  Her symptoms, including body aches, chills, fever, and cold sweats, are suggestive of viral illness.  COVID, influenza, RSV testing negative.  Urinalysis negative for leukocytes, nitrates.  The patient is not having any urinary symptoms at this time.  She is advised to rest at home. The results will be communicated to her as soon as they are available. If the tests are positive, further instructions will be provided. If her muscle pains intensify or differ from previous experiences, she should seek immediate medical attention at the ER for a comprehensive workup.    2. Nipple pain.  She reports intermittent left nipple pain over the past week or two, but there is no abnormal nipple discharge or significant redness and swelling. She is advised to apply an emollient, such as Weleda or a homeopathic alternative, on her nipples, especially if her son continues to nurse.   There is no indication of mastitis based on the examination.    3. Medication management.  She has taken three doses of 800 mg ibuprofen within a short period. She is advised to take 800 mg of ibuprofen every 8 hours to avoid potential liver damage.  May supplement with Tylenol.      Shared decision-making was utilized with patient for treatment plan. Medication discussed included indication for use and the potential benefits and side effects. Education was provided regarding the aforementioned assessments.   Differential Diagnosis, natural history, and supportive care discussed. All of the patient's questions were answered to their satisfaction at the time of discharge. Patient was encouraged to monitor symptoms closely. Those signs and symptoms which would warrant concern and mandate seeking a higher level of service through the emergency department discussed at length.  Patient stated agreement and understanding of this plan of care.    Disposition:  Home in stable condition     Voice Recognition Disclaimer:  Portions of this document were created using voice recognition software and [employer] technology provided by [employer]. The software does have a chance of producing errors of grammar and possibly content. I have made every reasonable attempt to correct obvious errors, but there may be errors of grammar and possibly content that I did not discover before finalizing the  documentation.    KARLENE Lane.

## 2025-02-28 ENCOUNTER — HOSPITAL ENCOUNTER (OUTPATIENT)
Facility: MEDICAL CENTER | Age: 35
End: 2025-02-28
Attending: FAMILY MEDICINE
Payer: COMMERCIAL

## 2025-02-28 LAB
ALBUMIN SERPL BCP-MCNC: 4.6 G/DL (ref 3.2–4.9)
ALBUMIN/GLOB SERPL: 1.6 G/DL
ALP SERPL-CCNC: 127 U/L (ref 30–99)
ALT SERPL-CCNC: 19 U/L (ref 2–50)
ANION GAP SERPL CALC-SCNC: 11 MMOL/L (ref 7–16)
AST SERPL-CCNC: 22 U/L (ref 12–45)
BASOPHILS # BLD AUTO: 0.9 % (ref 0–1.8)
BASOPHILS # BLD: 0.07 K/UL (ref 0–0.12)
BILIRUB SERPL-MCNC: 0.5 MG/DL (ref 0.1–1.5)
BUN SERPL-MCNC: 7 MG/DL (ref 8–22)
CALCIUM ALBUM COR SERPL-MCNC: 9 MG/DL (ref 8.5–10.5)
CALCIUM SERPL-MCNC: 9.5 MG/DL (ref 8.4–10.2)
CHLORIDE SERPL-SCNC: 102 MMOL/L (ref 96–112)
CHOLEST SERPL-MCNC: 178 MG/DL (ref 100–199)
CO2 SERPL-SCNC: 25 MMOL/L (ref 20–33)
CREAT SERPL-MCNC: 0.63 MG/DL (ref 0.5–1.4)
EOSINOPHIL # BLD AUTO: 0.15 K/UL (ref 0–0.51)
EOSINOPHIL NFR BLD: 2 % (ref 0–6.9)
ERYTHROCYTE [DISTWIDTH] IN BLOOD BY AUTOMATED COUNT: 46.8 FL (ref 35.9–50)
FASTING STATUS PATIENT QL REPORTED: NORMAL
GFR SERPLBLD CREATININE-BSD FMLA CKD-EPI: 117 ML/MIN/1.73 M 2
GLOBULIN SER CALC-MCNC: 2.9 G/DL (ref 1.9–3.5)
GLUCOSE SERPL-MCNC: 88 MG/DL (ref 65–99)
HCT VFR BLD AUTO: 42.2 % (ref 37–47)
HDLC SERPL-MCNC: 62 MG/DL
HGB BLD-MCNC: 13.9 G/DL (ref 12–16)
IMM GRANULOCYTES # BLD AUTO: 0.01 K/UL (ref 0–0.11)
IMM GRANULOCYTES NFR BLD AUTO: 0.1 % (ref 0–0.9)
LDLC SERPL CALC-MCNC: 108 MG/DL
LYMPHOCYTES # BLD AUTO: 2.32 K/UL (ref 1–4.8)
LYMPHOCYTES NFR BLD: 30.8 % (ref 22–41)
MCH RBC QN AUTO: 30.3 PG (ref 27–33)
MCHC RBC AUTO-ENTMCNC: 32.9 G/DL (ref 32.2–35.5)
MCV RBC AUTO: 92.1 FL (ref 81.4–97.8)
MONOCYTES # BLD AUTO: 0.44 K/UL (ref 0–0.85)
MONOCYTES NFR BLD AUTO: 5.8 % (ref 0–13.4)
NEUTROPHILS # BLD AUTO: 4.54 K/UL (ref 1.82–7.42)
NEUTROPHILS NFR BLD: 60.4 % (ref 44–72)
NRBC # BLD AUTO: 0 K/UL
NRBC BLD-RTO: 0 /100 WBC (ref 0–0.2)
PLATELET # BLD AUTO: 366 K/UL (ref 164–446)
PMV BLD AUTO: 9.5 FL (ref 9–12.9)
POTASSIUM SERPL-SCNC: 4.6 MMOL/L (ref 3.6–5.5)
PROT SERPL-MCNC: 7.5 G/DL (ref 6–8.2)
RBC # BLD AUTO: 4.58 M/UL (ref 4.2–5.4)
SODIUM SERPL-SCNC: 138 MMOL/L (ref 135–145)
T3FREE SERPL-MCNC: 3.7 PG/ML (ref 2–4.4)
T4 FREE SERPL-MCNC: 1.1 NG/DL (ref 0.93–1.7)
TRIGL SERPL-MCNC: 39 MG/DL (ref 0–149)
TSH SERPL DL<=0.005 MIU/L-ACNC: 2.14 UIU/ML (ref 0.38–5.33)
WBC # BLD AUTO: 7.5 K/UL (ref 4.8–10.8)

## 2025-02-28 PROCEDURE — 84481 FREE ASSAY (FT-3): CPT

## 2025-02-28 PROCEDURE — 80061 LIPID PANEL: CPT

## 2025-02-28 PROCEDURE — 36415 COLL VENOUS BLD VENIPUNCTURE: CPT

## 2025-02-28 PROCEDURE — 84443 ASSAY THYROID STIM HORMONE: CPT

## 2025-02-28 PROCEDURE — 85025 COMPLETE CBC W/AUTO DIFF WBC: CPT

## 2025-02-28 PROCEDURE — 84439 ASSAY OF FREE THYROXINE: CPT

## 2025-02-28 PROCEDURE — 80053 COMPREHEN METABOLIC PANEL: CPT

## 2025-04-28 ENCOUNTER — OFFICE VISIT (OUTPATIENT)
Dept: URGENT CARE | Facility: CLINIC | Age: 35
End: 2025-04-28
Payer: COMMERCIAL

## 2025-04-28 ENCOUNTER — HOSPITAL ENCOUNTER (OUTPATIENT)
Facility: MEDICAL CENTER | Age: 35
End: 2025-04-28
Attending: NURSE PRACTITIONER
Payer: COMMERCIAL

## 2025-04-28 VITALS
SYSTOLIC BLOOD PRESSURE: 114 MMHG | BODY MASS INDEX: 24.68 KG/M2 | RESPIRATION RATE: 20 BRPM | HEART RATE: 68 BPM | OXYGEN SATURATION: 98 % | TEMPERATURE: 96.8 F | DIASTOLIC BLOOD PRESSURE: 72 MMHG | HEIGHT: 69 IN | WEIGHT: 166.6 LBS

## 2025-04-28 DIAGNOSIS — R30.0 DYSURIA: ICD-10-CM

## 2025-04-28 LAB
APPEARANCE UR: CLEAR
BILIRUB UR STRIP-MCNC: NEGATIVE MG/DL
COLOR UR AUTO: YELLOW
GLUCOSE UR STRIP.AUTO-MCNC: NEGATIVE MG/DL
KETONES UR STRIP.AUTO-MCNC: NEGATIVE MG/DL
LEUKOCYTE ESTERASE UR QL STRIP.AUTO: NEGATIVE
NITRITE UR QL STRIP.AUTO: NEGATIVE
PH UR STRIP.AUTO: 6 [PH] (ref 5–8)
POCT INT CON NEG: NEGATIVE
POCT INT CON POS: POSITIVE
POCT URINE PREGNANCY TEST: NEGATIVE
PROT UR QL STRIP: NEGATIVE MG/DL
RBC UR QL AUTO: NEGATIVE
SP GR UR STRIP.AUTO: 1.01
UROBILINOGEN UR STRIP-MCNC: NORMAL MG/DL

## 2025-04-28 PROCEDURE — 87086 URINE CULTURE/COLONY COUNT: CPT

## 2025-04-28 ASSESSMENT — FIBROSIS 4 INDEX: FIB4 SCORE: 0.5

## 2025-04-28 NOTE — PROGRESS NOTES
Karyn Dalton is a 34 y.o. female who presents for UTI (3 days )      HPI  This is a new problem. Karyn Dalton is a 34 y.o. patient who presents to urgent care with c/o: Mild dysuria for 3 days.  She believes she may have a urinary tract infection.  She reports very mild burning.  Denies vaginal discharge that is unusual for her.  Denies vaginal odor.  She has been trying to drink a lot of fluids and staying well-hydrated.  She has mild suprapubic discomfort like an achiness.  Denies fever, chills, flank pain, NVD.  No other aggravating leaving factors.  + breastfeeding - starting to wean off 14 mo old son.     ROS See HPI    Allergies:     No Known Allergies    PMSFS Hx:  Past Medical History:   Diagnosis Date    Anemia     Anxiety     Anxiety about health 2/1/2023    Asthma     Breast lump 5/24/2022    Migraine     Physiological ovarian cysts     Tinea corporis 7/5/2022     Past Surgical History:   Procedure Laterality Date    OTHER      age 12 hyperhydrosis     Family History   Problem Relation Age of Onset    Anxiety disorder Mother     Alcohol abuse Sister     Anxiety disorder Sister     Alcohol abuse Maternal Aunt     Cancer Maternal Uncle         liver, throat, lung    Alcohol abuse Maternal Uncle     Heart Disease Paternal Uncle     Heart Disease Paternal Uncle     Breast Cancer Maternal Grandmother     Heart Disease Maternal Grandmother     Cancer Maternal Grandmother         breast    Alzheimer's Disease Maternal Grandmother     Alzheimer's Disease Maternal Grandfather     Heart Disease Maternal Grandfather     Alcohol abuse Maternal Grandfather     Heart Disease Paternal Grandmother     Cancer Paternal Grandmother         throat, tobacco use    Heart Attack Paternal Grandmother     Heart Attack Paternal Grandfather     Heart Disease Paternal Grandfather     Hypertension Paternal Grandfather     Diabetes Paternal Grandfather     Suicide Attempts Neg Hx     Schizophrenia Neg Hx     Bipolar  "disorder Neg Hx      Social History     Tobacco Use    Smoking status: Never    Smokeless tobacco: Never   Substance Use Topics    Alcohol use: Not Currently     Alcohol/week: 8.4 oz     Types: 4 Glasses of wine, 10 Cans of beer per week     Comment: Want to cut down.         Problems:   Patient Active Problem List   Diagnosis    DAVID (generalized anxiety disorder)    Alcohol abuse    Mild intermittent asthma without complication    Exposure to nickel dust    Vitamin D deficiency    Seasonal allergies    Elevated LDL cholesterol level    Change in mole    Alcohol cessation counseling    Blood in the stool       Medications:   Current Outpatient Medications on File Prior to Visit   Medication Sig Dispense Refill    Prenatal Vit-Fe Fumarate-FA (PRENATAL PO) Take  by mouth.      albuterol 108 (90 Base) MCG/ACT Aero Soln inhalation aerosol Inhale 2 Puffs every 6 hours as needed for Shortness of Breath. (Patient not taking: Reported on 4/28/2025) 8.5 g 0     No current facility-administered medications on file prior to visit.        Objective:     /72   Pulse 68   Temp 36 °C (96.8 °F) (Temporal)   Resp 20   Ht 1.753 m (5' 9\")   Wt 75.6 kg (166 lb 9.6 oz)   SpO2 98%   BMI 24.60 kg/m²     Physical Exam  Vitals and nursing note reviewed.   Constitutional:       Appearance: Normal appearance.   Cardiovascular:      Rate and Rhythm: Normal rate.      Pulses: Normal pulses.   Pulmonary:      Effort: Pulmonary effort is normal.   Abdominal:      Tenderness: There is abdominal tenderness (Suprapubic). There is no right CVA tenderness, left CVA tenderness or guarding.   Skin:     General: Skin is warm and dry.      Capillary Refill: Capillary refill takes less than 2 seconds.   Neurological:      Mental Status: She is alert.       Results for orders placed or performed in visit on 04/28/25   POCT Urinalysis    Collection Time: 04/28/25  3:55 PM   Result Value Ref Range    POC Color YELLOW Negative    POC Appearance " CLEAR Negative    POC Glucose NEGATIVE Negative mg/dL    POC Bilirubin NEGATIVE Negative mg/dL    POC Ketones NEGATIVE Negative mg/dL    POC Specific Gravity 1.010 <1.005 - >1.030    POC Blood NEGATIVE Negative    POC Urine PH 6.0 5.0 - 8.0    POC Protein NEGATIVE Negative mg/dL    POC Urobiligen 0.2 E. U. /DL Negative (0.2) mg/dL    POC Nitrites NEGATIVE Negative    POC Leukocyte Esterase NEGATIVE Negative         Assessment /Associated Orders:      1. Dysuria  POCT Urinalysis    URINE CULTURE(NEW)          Medical Decision Making:    Karyn Dalton is a very pleasant 34 y.o. female who is clinically stable at today's acute urgent care visit. Presents with acute problem/ concern today.    No acute distress is noted at the time of the visit.  VSS. Appropriate for outpatient care at this time.   hCG negative  OTC Azo prn dysuria  Avoid bladder irritating foods and beverages  Urinalysis does not show an acute cystitis, however, the sample is very dilute and patient has symptoms that are consistent with a acute cystitis.    Urine culture is pending  Through shared decision making a discussion of the Dx and DDx, management options (risks,benefits, and alternatives to planned treatment), natural progression, supportive care and indications for immediate follow-up discussed. Expressed understanding and the treatment plan was agreed upon.    Questions were encouraged and answered     Follow Up:   Return to urgent care prn if new or worsening sx or if there is no improvement in condition prn.    Educated in Red flags and indications to immediately call 911 or present to the Emergency Department.           Please note that this dictation was created using voice recognition software. I have worked with consultants from the vendor as well as technical experts from [employer] to optimize the interface. I have made every reasonable attempt to correct obvious errors, but I expect that there are errors of grammar and  possibly content that I did not discover before finalizing the note.  This note was electronically signed by provider

## 2025-04-30 LAB
BACTERIA UR CULT: NORMAL
SIGNIFICANT IND 70042: NORMAL
SITE SITE: NORMAL
SOURCE SOURCE: NORMAL

## 2025-05-01 ENCOUNTER — RESULTS FOLLOW-UP (OUTPATIENT)
Dept: URGENT CARE | Facility: CLINIC | Age: 35
End: 2025-05-01

## 2025-05-01 LAB
BACTERIA UR CULT: NORMAL
SIGNIFICANT IND 70042: NORMAL
SITE SITE: NORMAL
SOURCE SOURCE: NORMAL